# Patient Record
Sex: MALE | Race: BLACK OR AFRICAN AMERICAN | Employment: OTHER | URBAN - METROPOLITAN AREA
[De-identification: names, ages, dates, MRNs, and addresses within clinical notes are randomized per-mention and may not be internally consistent; named-entity substitution may affect disease eponyms.]

---

## 2017-08-14 ENCOUNTER — HOSPITAL ENCOUNTER (OUTPATIENT)
Dept: PHYSICAL THERAPY | Age: 67
Discharge: HOME | End: 2017-08-14
Payer: MEDICARE

## 2017-08-15 ENCOUNTER — HOSPITAL ENCOUNTER (OUTPATIENT)
Dept: PHYSICAL THERAPY | Age: 67
Discharge: HOME | End: 2017-08-15
Payer: MEDICARE

## 2017-08-15 PROCEDURE — 97161 PT EVAL LOW COMPLEX 20 MIN: CPT

## 2017-08-15 PROCEDURE — G8978 MOBILITY CURRENT STATUS: HCPCS

## 2017-08-15 PROCEDURE — G8979 MOBILITY GOAL STATUS: HCPCS

## 2017-08-15 NOTE — PROGRESS NOTES
Tucker Vizcarra  : 1950 [facility]  [address], [address], [place]  [phone]   [phone]          OUTPATIENT PHYSICAL THERAPY:Initial Assessment 8/15/2017    ICD-10: Treatment Diagnosis: Pain in left knee (M25.562); Low back pain (M54.5); Precautions/Allergies:   Review of patient's allergies indicates not on file. Fall Risk Score: 1 (? 5 = High Risk)  MD Orders: Evaluate and Treat MEDICAL/REFERRING DIAGNOSIS:  Unilateral primary osteoarthritis, left knee [M17.12]   DATE OF ONSET: Chronic   REFERRING PHYSICIAN: Cuca Wolfe MD  RETURN PHYSICIAN APPOINTMENT: 17     INITIAL ASSESSMENT:  Mr. Dieudonne Horowitz presents in therapy with reports of chronic L knee and lower back pain. He reports his low back symptoms are worse than L knee pain. He reports a history of lower lumbar herniated disc. He will benefit from skilled PT in order to reduce symptoms for returning to his very active lifestyle and increase safety with functional mobility. PROBLEM LIST (Impacting functional limitations):  1. Decreased Strength  2. Decreased ADL/Functional Activities  3. Increased Pain  4. Decreased Activity Tolerance  5. Decreased Flexibility/Joint Mobility  6. Decreased Knowledge of Precautions  7. Decreased [place] with Home Exercise Program INTERVENTIONS PLANNED:  1. Cold  2. Heat  3. Home Exercise Program (HEP)  4. Manual Therapy  5. Neuromuscular Re-education/Strengthening  6. Range of Motion (ROM)  7. Therapeutic Activites  8. Therapeutic Exercise/Strengthening   TREATMENT PLAN:  Effective Dates: 8/15/17 TO 17. Frequency/Duration: 2 times a week for 6 weeks  GOALS: (Goals have been discussed and agreed upon with patient.)     SHORT-TERM FUNCTIONAL GOALS: Time Frame: 3 weeks  1. Patient will be compliant with HEP focused on lumbar stabilization and strength/ROM.    2.  Patient will rate low back pain no greater than 5/10 at worst for improved tolerance to daily and work activities. DISCHARGE GOALS: Time Frame: 6 weeks  1. Patient will be independent with comprehensive HEP focused on lumbar stabilization and core strengthening. 2.  Patient will rate low back pain no greater than 2/10 and which does not significantly interfere with daily or work duties. 3.  Patient will demonstrate lumbar AROM to be [facility] for improved safety with functional mobility. Rehabilitation Potential For Stated Goals: Good  Regarding Chris Freitas's therapy, I certify that the treatment plan above will be carried out by a therapist or under their direction. Thank you for this referral,  Finesse Torres, PT, DPT     Referring Physician Signature: Abigail Leonard MD              Date                    The information in this section was collected on 8/15/17 (except where otherwise noted). HISTORY:   History of Present Injury/Illness (Reason for Referral):  Patient reports chronic history of L knee and low back pain. He states he may be undergoing L knee replacement next Spring. He reports his lower back pain is worse than the L knee, and has prevented him from returning to cycling and walking for fitness. He reports imaging demonstrated a lower lumbar herniated disc. Past Medical History/Comorbidities:   Mr. Brenda Peter  has no past medical history on file. Mr. Brenda Peter  has no past surgical history on file. Social History/Living Environment:     Independent   Prior Level of Function/Work/Activity:  Very active before pain - cycling and walkign  Personal Factors:          Sex:  male        Age:  74 y.o. Overall Behavior:  Very active and wishes to return     Current Medications:     No current outpatient prescriptions on file.    Date Last Reviewed:  8/16/2017     Number of Personal Factors/Comorbidities that affect the Plan of Care: 0: LOW COMPLEXITY   EXAMINATION:   Observation/Orthostatic Postural Assessment:          Patient appears in very good shape, slightly cautious when rising from a seated position   Palpation:          No tenderness at L knee or with central PA mobilizations throughout lumbosacral spinal segments   ROM:          L knee is 2-110; Lumbar motion is all WNL except standing extension which is 75%  Strength:          WNL throughout B/L LEs       Body Structures Involved:  1. Nerves  2. Bones  3. Joints  4. Muscles  5. Ligaments Body Functions Affected:  1. Sensory/Pain  2. Movement Related Activities and Participation Affected:  1. General Tasks and Demands  2. Mobility  3. Domestic Life  4. Interpersonal Interactions and Relationships  5. Community, Social and [place]   Number of elements (examined above) that affect the Plan of Care: 1-2: LOW COMPLEXITY   CLINICAL PRESENTATION:   Presentation: Stable and uncomplicated: LOW COMPLEXITY   CLINICAL DECISION MAKING:   Outcome Measure: Tool Used: Lower Extremity Functional Scale (LEFS)  Score:  Initial: 31/80 Most Recent: X/80 (Date: -- )   Interpretation of Score: 20 questions each scored on a 5 point scale with 0 representing \"extreme difficulty or unable to perform\" and 4 representing \"no difficulty\". The lower the score, the greater the functional disability. 80/80 represents no disability. Minimal detectable change is 9 points. Score 80 79-63 62-48 47-32 31-16 15-1 0   Modifier CH CI CJ CK CL CM CN     ? Mobility - Walking and Moving Around:     - CURRENT STATUS: CL - 60%-79% impaired, limited or restricted    - GOAL STATUS: CJ - 20%-39% impaired, limited or restricted    - D/C STATUS:  ---------------To be determined---------------      Medical Necessity:   · Patient is expected to demonstrate progress in strength, range of motion and functional technique to improve safety during functional mobility. Reason for Services/Other Comments:  · Patient continues to require skilled intervention due to not reaching long term goals.    Use of outcome tool(s) and clinical judgement create a POC that gives a: Clear prediction of patient's progress: LOW COMPLEXITY            TREATMENT:   (In addition to Assessment/Re-Assessment sessions the following treatments were rendered)  Pre-treatment Symptoms/Complaints:  Patient reports L knee and lower back pain, rated as 10/10 at worst. He reports his back pain is the main issue preventing him from his regular exercise activities such as cycling and walking. Pain: Initial:   4/10 Post Session:  4/10     THERAPEUTIC EXERCISE: (0 minutes):  Exercises per grid below to improve mobility and strength. Required minimal verbal cues to promote proper body alignment, promote proper body posture and promote proper body mechanics. Progressed complexity of movement as indicated. [employer] [facility]  Treatment/Session Assessment:    · Response to Treatment:  Patient verbalized understanding of plan of care, and wishes to focus on lower back at this time. · Compliance with Program/Exercises: Will assess as treatment progresses. · Recommendations/Intent for next treatment session: \"Next visit will focus on advancements to more challenging activities\".   Total Treatment Duration:       Harjeet Welsh, PT, DPT

## 2017-08-16 NOTE — PROGRESS NOTES
Ambulatory/Rehab Services H2 Model Falls Risk Assessment    Risk Factor Pts. ·   Confusion/Disorientation/Impulsivity  []    4 ·   Symptomatic Depression  []   2 ·   Altered Elimination  []   1 ·   Dizziness/Vertigo  []   1 ·   Gender (Male)  [x]   1 ·   Any administered antiepileptics (anticonvulsants):  []   2 ·   Any administered benzodiazepines:  []   1 ·   Visual Impairment (specify):  []   1 ·   Portable Oxygen Use  []   1 ·   Orthostatic ? BP  []   1 ·   History of Recent Falls (within 3 mos.)  []   5     Ability to Rise from Chair (choose one) Pts. ·   Ability to rise in a single movement  [x]   0 ·   Pushes up, successful in one attempt  []   1 ·   Multiple attempts, but successful  []   3 ·   Unable to rise without assistance  []   4   Total: (5 or greater = High Risk) 1     Falls Prevention Plan:   []                Physical Limitations to Exercise (specify):   []                Mobility Assistance Device (type):   []                Exercise/Equipment Adaptation (specify):    ©2010 [facility] [address] Patent #[id number].  Federal Law prohibits the replication, distribution or use without written permission from [facility] [employer]

## 2017-08-17 ENCOUNTER — HOSPITAL ENCOUNTER (OUTPATIENT)
Dept: PHYSICAL THERAPY | Age: 67
Discharge: HOME | End: 2017-08-17
Payer: MEDICARE

## 2017-08-17 PROCEDURE — 97110 THERAPEUTIC EXERCISES: CPT

## 2017-08-17 PROCEDURE — 97016 VASOPNEUMATIC DEVICE THERAPY: CPT

## 2017-08-17 NOTE — PROGRESS NOTES
Chelsea Landa  : 1950 [facility]  [address], [address], [place]  [phone]   [phone]          OUTPATIENT PHYSICAL THERAPY:Daily Note 2017    ICD-10: Treatment Diagnosis: Pain in left knee (M25.562); Low back pain (M54.5); Precautions/Allergies:   Review of patient's allergies indicates not on file. Fall Risk Score: 1 (? 5 = High Risk)  MD Orders: Evaluate and Treat MEDICAL/REFERRING DIAGNOSIS:  Unilateral primary osteoarthritis, left knee [M17.12]   DATE OF ONSET: Chronic   REFERRING PHYSICIAN: Milagros Perez MD  RETURN PHYSICIAN APPOINTMENT: 17     INITIAL ASSESSMENT:  Mr. Stef Perez presents in therapy with reports of chronic L knee and lower back pain. He reports his low back symptoms are worse than L knee pain. He reports a history of lower lumbar herniated disc. He will benefit from skilled PT in order to reduce symptoms for returning to his very active lifestyle and increase safety with functional mobility. PROBLEM LIST (Impacting functional limitations):  1. Decreased Strength  2. Decreased ADL/Functional Activities  3. Increased Pain  4. Decreased Activity Tolerance  5. Decreased Flexibility/Joint Mobility  6. Edema/Girth  7. Decreased Knowledge of Precautions  8. Decreased [place] with Home Exercise Program INTERVENTIONS PLANNED:  1. Cold  2. Heat  3. Home Exercise Program (HEP)  4. Manual Therapy  5. Neuromuscular Re-education/Strengthening  6. Range of Motion (ROM)  7. Therapeutic Activites  8. Therapeutic Exercise/Strengthening   TREATMENT PLAN:  Effective Dates: 8/15/17 TO 17. Frequency/Duration: 2 times a week for 6 weeks  GOALS: (Goals have been discussed and agreed upon with patient.)     SHORT-TERM FUNCTIONAL GOALS: Time Frame: 3 weeks  1. Patient will be compliant with HEP focused on lumbar stabilization and strength/ROM.    2.  Patient will rate low back pain no greater than 5/10 at worst for improved tolerance to daily and work activities. DISCHARGE GOALS: Time Frame: 6 weeks  1. Patient will be independent with comprehensive HEP focused on lumbar stabilization and core strengthening. 2.  Patient will rate low back pain no greater than 2/10 and which does not significantly interfere with daily or work duties. 3.  Patient will demonstrate lumbar AROM to be [facility] for improved safety with functional mobility. Rehabilitation Potential For Stated Goals: Good  Regarding Chris Freitas's therapy, I certify that the treatment plan above will be carried out by a therapist or under their direction. Thank you for this referral,  Cameron Leung PT, DPT     Referring Physician Signature: Albert Simmons MD              Date                    The information in this section was collected on 8/15/17 (except where otherwise noted). HISTORY:   History of Present Injury/Illness (Reason for Referral):  Patient reports chronic history of L knee and low back pain. He states he may be undergoing L knee replacement next Spring. He reports his lower back pain is worse than the L knee, and has prevented him from returning to cycling and walking for fitness. He reports imaging demonstrated a lower lumbar herniated disc. Past Medical History/Comorbidities:   Mr. Allison Avila  has no past medical history on file. Mr. Allison Avila  has no past surgical history on file. Social History/Living Environment:     Independent   Prior Level of Function/Work/Activity:  Very active before pain - cycling and walkign  Personal Factors:          Sex:  male        Age:  74 y.o. Overall Behavior:  Very active and wishes to return     Current Medications:     No current outpatient prescriptions on file.    Date Last Reviewed:  8/17/2017     Number of Personal Factors/Comorbidities that affect the Plan of Care: 0: LOW COMPLEXITY   EXAMINATION:   Observation/Orthostatic Postural Assessment:          Patient appears in very good shape, slightly cautious when rising from a seated position; minimal to moderate edema at L knee  Palpation:          No tenderness at L knee or with central PA mobilizations throughout lumbosacral spinal segments   ROM:          L knee is 2-110; Lumbar motion is all WNL except standing extension which is 75%  Strength:          WNL throughout B/L LEs       Body Structures Involved:  1. Nerves  2. Bones  3. Joints  4. Muscles  5. Ligaments Body Functions Affected:  1. Sensory/Pain  2. Movement Related Activities and Participation Affected:  1. General Tasks and Demands  2. Mobility  3. Domestic Life  4. Interpersonal Interactions and Relationships  5. Community, Social and [place]   Number of elements (examined above) that affect the Plan of Care: 1-2: LOW COMPLEXITY   CLINICAL PRESENTATION:   Presentation: Stable and uncomplicated: LOW COMPLEXITY   CLINICAL DECISION MAKING:   Outcome Measure: Tool Used: Lower Extremity Functional Scale (LEFS)  Score:  Initial: 31/80 Most Recent: X/80 (Date: -- )   Interpretation of Score: 20 questions each scored on a 5 point scale with 0 representing \"extreme difficulty or unable to perform\" and 4 representing \"no difficulty\". The lower the score, the greater the functional disability. 80/80 represents no disability. Minimal detectable change is 9 points. Score 80 79-63 62-48 47-32 31-16 15-1 0   Modifier CH CI CJ CK CL CM CN     ? Mobility - Walking and Moving Around:     - CURRENT STATUS: CL - 60%-79% impaired, limited or restricted    - GOAL STATUS: CJ - 20%-39% impaired, limited or restricted    - D/C STATUS:  ---------------To be determined---------------      Medical Necessity:   · Patient is expected to demonstrate progress in strength, range of motion and functional technique to improve safety during functional mobility.   Reason for Services/Other Comments:  · Patient continues to require skilled intervention due to not reaching long term goals.   Use of outcome tool(s) and clinical judgement create a POC that gives a: Clear prediction of patient's progress: LOW COMPLEXITY            TREATMENT:   (In addition to Assessment/Re-Assessment sessions the following treatments were rendered)  Pre-treatment Symptoms/Complaints:  Patient reports his back pain has improved a lot with his standing lumbar extensions. Pain: Initial:   4/10 Post Session:  2/10     THERAPEUTIC EXERCISE: (30 minutes):  Exercises per grid below to improve mobility and strength. Required minimal verbal cues to promote proper body alignment, promote proper body posture and promote proper body mechanics. Progressed complexity of movement as indicated. Date:  8-17-17 Date:   Date:     Activity/Exercise Parameters Parameters Parameters   Standing lumbar extension 3x10  Over // bar as fulcrum      Prone press ups  3x10                                        Patient received vasopneumatic compression to L knee via GameReady, patient supine with LEs supported under wedge, medium compression, 40 degrees, performed to reduce pain and inflammation at the L knee, and to reduce edema, 15 minutes; Patient responded very well to treatment, noting decreased pain. Treatment/Session Assessment:    · Response to Treatment:  Patient is responding very well to current treatment program.  · Compliance with Program/Exercises: Will assess as treatment progresses. · Recommendations/Intent for next treatment session: \"Next visit will focus on advancements to more challenging activities\".   Total Treatment Duration:  PT Patient Time In/Time Out  Time In: 1100  Time Out: ABRAHAM Gibbons, DPT

## 2017-08-21 ENCOUNTER — APPOINTMENT (OUTPATIENT)
Dept: PHYSICAL THERAPY | Age: 67
End: 2017-08-21
Payer: MEDICARE

## 2017-08-22 ENCOUNTER — HOSPITAL ENCOUNTER (OUTPATIENT)
Dept: PHYSICAL THERAPY | Age: 67
Discharge: HOME | End: 2017-08-22
Payer: MEDICARE

## 2017-08-22 PROCEDURE — 97140 MANUAL THERAPY 1/> REGIONS: CPT

## 2017-08-22 PROCEDURE — 97110 THERAPEUTIC EXERCISES: CPT

## 2017-08-22 NOTE — PROGRESS NOTES
Arnold Agarwal  : 1950 [facility] at [facility]  [address], [address], [place] [address]  [phone]   [phone]          OUTPATIENT PHYSICAL THERAPY:Daily Note 2017    ICD-10: Treatment Diagnosis: Pain in left knee (M25.562); Low back pain (M54.5); Precautions/Allergies:   Review of patient's allergies indicates not on file. Fall Risk Score: 1 (? 5 = High Risk)  MD Orders: Evaluate and Treat MEDICAL/REFERRING DIAGNOSIS:  Unilateral primary osteoarthritis, left knee [M17.12]   DATE OF ONSET: Chronic   REFERRING PHYSICIAN: Leonel Ormond, MD  RETURN PHYSICIAN APPOINTMENT: 17     INITIAL ASSESSMENT:  Mr. Marcella Mondragon presents in therapy with reports of chronic L knee and lower back pain. He reports his low back symptoms are worse than L knee pain. He reports a history of lower lumbar herniated disc. He will benefit from skilled PT in order to reduce symptoms for returning to his very active lifestyle and increase safety with functional mobility. PROBLEM LIST (Impacting functional limitations):  1. Decreased Strength  2. Decreased ADL/Functional Activities  3. Increased Pain  4. Decreased Activity Tolerance  5. Decreased Flexibility/Joint Mobility  6. Edema/Girth  7. Decreased Knowledge of Precautions  8. Decreased [place] with Home Exercise Program INTERVENTIONS PLANNED:  1. Cold  2. Heat  3. Home Exercise Program (HEP)  4. Manual Therapy  5. Neuromuscular Re-education/Strengthening  6. Range of Motion (ROM)  7. Therapeutic Activites  8. Therapeutic Exercise/Strengthening   TREATMENT PLAN:  Effective Dates: 8/15/17 TO 17. Frequency/Duration: 2 times a week for 6 weeks  GOALS: (Goals have been discussed and agreed upon with patient.)     SHORT-TERM FUNCTIONAL GOALS: Time Frame: 3 weeks  1. Patient will be compliant with HEP focused on lumbar stabilization and strength/ROM.    2.  Patient will rate low back pain no greater than 5/10 at worst for improved tolerance to daily and work activities. DISCHARGE GOALS: Time Frame: 6 weeks  1. Patient will be independent with comprehensive HEP focused on lumbar stabilization and core strengthening. 2.  Patient will rate low back pain no greater than 2/10 and which does not significantly interfere with daily or work duties. 3.  Patient will demonstrate lumbar AROM to be [facility] for improved safety with functional mobility. Rehabilitation Potential For Stated Goals: Good  Regarding Chris Freitas's therapy, I certify that the treatment plan above will be carried out by a therapist or under their direction. Thank you for this referral,  Jonathan Babcock, PT, DPT     Referring Physician Signature: Murphy Ashely MD              Date                    The information in this section was collected on 8/15/17 (except where otherwise noted). HISTORY:   History of Present Injury/Illness (Reason for Referral):  Patient reports chronic history of L knee and low back pain. He states he may be undergoing L knee replacement next Spring. He reports his lower back pain is worse than the L knee, and has prevented him from returning to cycling and walking for fitness. He reports imaging demonstrated a lower lumbar herniated disc. Past Medical History/Comorbidities:   Mr. Mimi Mir  has no past medical history on file. Mr. Mimi Mir  has no past surgical history on file. Social History/Living Environment:     Independent   Prior Level of Function/Work/Activity:  Very active before pain - cycling and walkign  Personal Factors:          Sex:  male        Age:  74 y.o. Overall Behavior:  Very active and wishes to return     Current Medications:     No current outpatient prescriptions on file.    Date Last Reviewed:  8/22/2017     Number of Personal Factors/Comorbidities that affect the Plan of Care: 0: LOW COMPLEXITY   EXAMINATION:   Observation/Orthostatic Postural Assessment:          Patient appears in very good shape, slightly cautious when rising from a seated position; minimal to moderate edema at L knee  Palpation:          No tenderness at L knee or with central PA mobilizations throughout lumbosacral spinal segments   ROM:          L knee is 2-110; Lumbar motion is all WNL except standing extension which is 75%  Strength:          WNL throughout B/L LEs       Body Structures Involved:  1. Nerves  2. Bones  3. Joints  4. Muscles  5. Ligaments Body Functions Affected:  1. Sensory/Pain  2. Movement Related Activities and Participation Affected:  1. General Tasks and Demands  2. Mobility  3. Domestic Life  4. Interpersonal Interactions and Relationships  5. Community, Social and [place]   Number of elements (examined above) that affect the Plan of Care: 1-2: LOW COMPLEXITY   CLINICAL PRESENTATION:   Presentation: Stable and uncomplicated: LOW COMPLEXITY   CLINICAL DECISION MAKING:   Outcome Measure: Tool Used: Lower Extremity Functional Scale (LEFS)  Score:  Initial: 31/80 Most Recent: X/80 (Date: -- )   Interpretation of Score: 20 questions each scored on a 5 point scale with 0 representing \"extreme difficulty or unable to perform\" and 4 representing \"no difficulty\". The lower the score, the greater the functional disability. 80/80 represents no disability. Minimal detectable change is 9 points. Score 80 79-63 62-48 47-32 31-16 15-1 0   Modifier CH CI CJ CK CL CM CN     ? Mobility - Walking and Moving Around:     - CURRENT STATUS: CL - 60%-79% impaired, limited or restricted    - GOAL STATUS: CJ - 20%-39% impaired, limited or restricted    - D/C STATUS:  ---------------To be determined---------------      Medical Necessity:   · Patient is expected to demonstrate progress in strength, range of motion and functional technique to improve safety during functional mobility.   Reason for Services/Other Comments:  · Patient continues to require skilled intervention due to not reaching long term goals.   Use of outcome tool(s) and clinical judgement create a POC that gives a: Clear prediction of patient's progress: LOW COMPLEXITY            TREATMENT:   (In addition to Assessment/Re-Assessment sessions the following treatments were rendered)  Pre-treatment Symptoms/Complaints:  Patient reports his low back is doing really well, but his L knee has been the main issue. Pain: Initial:   4/10 Post Session:  2/10     THERAPEUTIC EXERCISE: (30 minutes):  Exercises per grid below to improve mobility and strength. Required minimal verbal cues to promote proper body alignment, promote proper body posture and promote proper body mechanics. Progressed complexity of movement as indicated. Date:  8-17-17 Date:  8-22-17 Date:     Activity/Exercise Parameters Parameters Parameters   Standing lumbar extension 3x10  Over // bar as fulcrum  3x10  Over // bar as fulcrum     Prone press ups  3x10    3x10                                       Manual therapy: (15 minutes): Patient received patellar mobilizations in all directions, 10 second oscillations at end range x 10 each plane, knee in extension with towel roll under knee, patient supine, to improve flexibility at the PFJ and decrease pain; patient also received passive knee extension over-pressure to end range, 20 second oscillations in grade II-III, x 10 each, patient supine, to improve flexibility for better functional mobility       Patient received vasopneumatic compression to L knee via GameReady, patient supine with LEs supported under wedge, medium compression, 40 degrees, performed to reduce pain and inflammation at the L knee, and to reduce edema, 15 minutes; Patient responded very well to treatment, noting decreased pain. (NOT TODAY)    Treatment/Session Assessment:    · Response to Treatment:  Patient is responding very well to current treatment program.  · Compliance with Program/Exercises: Will assess as treatment progresses.   · Recommendations/Intent for next treatment session: \"Next visit will focus on advancements to more challenging activities\".   Total Treatment Duration:       Bard Francisco, PT, DPT

## 2017-08-29 ENCOUNTER — HOSPITAL ENCOUNTER (OUTPATIENT)
Dept: PHYSICAL THERAPY | Age: 67
Discharge: HOME | End: 2017-08-29
Payer: MEDICARE

## 2017-08-29 PROCEDURE — 97016 VASOPNEUMATIC DEVICE THERAPY: CPT

## 2017-08-29 PROCEDURE — 97140 MANUAL THERAPY 1/> REGIONS: CPT

## 2017-08-29 NOTE — PROGRESS NOTES
Lauren Lancaster  : 1950 [facility]  [address], [address], [place]  [phone]   [phone]          OUTPATIENT PHYSICAL THERAPY:Daily Note 2017    ICD-10: Treatment Diagnosis: Pain in left knee (M25.562); Low back pain (M54.5); Precautions/Allergies:   Review of patient's allergies indicates not on file. Fall Risk Score: 1 (? 5 = High Risk)  MD Orders: Evaluate and Treat MEDICAL/REFERRING DIAGNOSIS:  Unilateral primary osteoarthritis, left knee [M17.12]   DATE OF ONSET: Chronic   REFERRING PHYSICIAN: Benji Silva MD  RETURN PHYSICIAN APPOINTMENT: 17     INITIAL ASSESSMENT:  Mr. Albert Ulrich presents in therapy with reports of chronic L knee and lower back pain. He reports his low back symptoms are worse than L knee pain. He reports a history of lower lumbar herniated disc. He will benefit from skilled PT in order to reduce symptoms for returning to his very active lifestyle and increase safety with functional mobility. PROBLEM LIST (Impacting functional limitations):  1. Decreased Strength  2. Decreased ADL/Functional Activities  3. Increased Pain  4. Decreased Activity Tolerance  5. Decreased Flexibility/Joint Mobility  6. Edema/Girth  7. Decreased Knowledge of Precautions  8. Decreased [place] with Home Exercise Program INTERVENTIONS PLANNED:  1. Cold  2. Heat  3. Home Exercise Program (HEP)  4. Manual Therapy  5. Neuromuscular Re-education/Strengthening  6. Range of Motion (ROM)  7. Therapeutic Activites  8. Therapeutic Exercise/Strengthening   TREATMENT PLAN:  Effective Dates: 8/15/17 TO 17. Frequency/Duration: 2 times a week for 6 weeks  GOALS: (Goals have been discussed and agreed upon with patient.)     SHORT-TERM FUNCTIONAL GOALS: Time Frame: 3 weeks  1. Patient will be compliant with HEP focused on lumbar stabilization and strength/ROM.    2.  Patient will rate low back pain no greater than 5/10 at worst for improved tolerance to daily and work activities. DISCHARGE GOALS: Time Frame: 6 weeks  1. Patient will be independent with comprehensive HEP focused on lumbar stabilization and core strengthening. 2.  Patient will rate low back pain no greater than 2/10 and which does not significantly interfere with daily or work duties. 3.  Patient will demonstrate lumbar AROM to be [facility] for improved safety with functional mobility. Rehabilitation Potential For Stated Goals: Good  Regarding Chris Freitas's therapy, I certify that the treatment plan above will be carried out by a therapist or under their direction. Thank you for this referral,  Cameron Leung PT, DPT     Referring Physician Signature: Albert Simmons MD              Date                    The information in this section was collected on 8/15/17 (except where otherwise noted). HISTORY:   History of Present Injury/Illness (Reason for Referral):  Patient reports chronic history of L knee and low back pain. He states he may be undergoing L knee replacement next Spring. He reports his lower back pain is worse than the L knee, and has prevented him from returning to cycling and walking for fitness. He reports imaging demonstrated a lower lumbar herniated disc. Past Medical History/Comorbidities:   Mr. Allison Avila  has no past medical history on file. Mr. Allison Avila  has no past surgical history on file. Social History/Living Environment:     Independent   Prior Level of Function/Work/Activity:  Very active before pain - cycling and walkign  Personal Factors:          Sex:  male        Age:  74 y.o. Overall Behavior:  Very active and wishes to return     Current Medications:     No current outpatient prescriptions on file.    Date Last Reviewed:  8/29/2017     Number of Personal Factors/Comorbidities that affect the Plan of Care: 0: LOW COMPLEXITY   EXAMINATION:   Observation/Orthostatic Postural Assessment:          Patient appears in very good shape, slightly cautious when rising from a seated position; minimal to moderate edema at L knee  Palpation:          No tenderness at L knee or with central PA mobilizations throughout lumbosacral spinal segments   ROM:          L knee is 2-110; Lumbar motion is all WNL except standing extension which is 75%  Strength:          WNL throughout B/L LEs       Body Structures Involved:  1. Nerves  2. Bones  3. Joints  4. Muscles  5. Ligaments Body Functions Affected:  1. Sensory/Pain  2. Movement Related Activities and Participation Affected:  1. General Tasks and Demands  2. Mobility  3. Domestic Life  4. Interpersonal Interactions and Relationships  5. Community, Social and [place]   Number of elements (examined above) that affect the Plan of Care: 1-2: LOW COMPLEXITY   CLINICAL PRESENTATION:   Presentation: Stable and uncomplicated: LOW COMPLEXITY   CLINICAL DECISION MAKING:   Outcome Measure: Tool Used: Lower Extremity Functional Scale (LEFS)  Score:  Initial: 31/80 Most Recent: X/80 (Date: -- )   Interpretation of Score: 20 questions each scored on a 5 point scale with 0 representing \"extreme difficulty or unable to perform\" and 4 representing \"no difficulty\". The lower the score, the greater the functional disability. 80/80 represents no disability. Minimal detectable change is 9 points. Score 80 79-63 62-48 47-32 31-16 15-1 0   Modifier CH CI CJ CK CL CM CN     ? Mobility - Walking and Moving Around:     - CURRENT STATUS: CL - 60%-79% impaired, limited or restricted    - GOAL STATUS: CJ - 20%-39% impaired, limited or restricted    - D/C STATUS:  ---------------To be determined---------------      Medical Necessity:   · Patient is expected to demonstrate progress in strength, range of motion and functional technique to improve safety during functional mobility.   Reason for Services/Other Comments:  · Patient continues to require skilled intervention due to not reaching long term goals.   Use of outcome tool(s) and clinical judgement create a POC that gives a: Clear prediction of patient's progress: LOW COMPLEXITY            TREATMENT:   (In addition to Assessment/Re-Assessment sessions the following treatments were rendered)  Pre-treatment Symptoms/Complaints:  Patient reports he is doing well, but received a gel injection yesterday and was told to take PT very easy today. Pain: Initial:   4/10 Post Session:  2/10     THERAPEUTIC EXERCISE: (0 minutes):  Exercises per grid below to improve mobility and strength. Required minimal verbal cues to promote proper body alignment, promote proper body posture and promote proper body mechanics. Progressed complexity of movement as indicated. Date:  8-17-17 Date:  8-22-17 Date:     Activity/Exercise Parameters Parameters Parameters   Standing lumbar extension 3x10  Over // bar as fulcrum  3x10  Over // bar as fulcrum     Prone press ups  3x10    3x10                                       Manual therapy: (30 minutes): Patient received patellar mobilizations in all directions, 10 second oscillations at end range x 10 each plane, knee in extension with towel roll under knee, patient supine, to improve flexibility at the PFJ and decrease pain; patient also received passive knee extension over-pressure to end range, 20 second oscillations in grade II-III, x 10 each, patient supine, to improve flexibility for better functional mobility   Patient also received manual STM to distal quadriceps and medial knee musculature to improve pain and decrease edema at the affected LE      Patient received vasopneumatic compression to L knee via GameReady, patient supine with LEs supported under wedge, medium compression, 40 degrees, performed to reduce pain and inflammation at the L knee, and to reduce edema, 15 minutes; Patient responded very well to treatment, noting decreased pain.      Treatment/Session Assessment:    · Response to Treatment:  Patient is responding very well to current treatment program.  · Compliance with Program/Exercises: Will assess as treatment progresses. · Recommendations/Intent for next treatment session: \"Next visit will focus on advancements to more challenging activities\".   Total Treatment Duration:  PT Patient Time In/Time Out  Time In: 1300  Time Out: 1345    Vivian Sadler, PT, DPT

## 2017-08-31 ENCOUNTER — HOSPITAL ENCOUNTER (OUTPATIENT)
Dept: PHYSICAL THERAPY | Age: 67
Discharge: HOME | End: 2017-08-31
Payer: MEDICARE

## 2017-08-31 PROCEDURE — 97016 VASOPNEUMATIC DEVICE THERAPY: CPT

## 2017-08-31 PROCEDURE — 97140 MANUAL THERAPY 1/> REGIONS: CPT

## 2017-08-31 NOTE — PROGRESS NOTES
Yazan Mccoy  : 1950 [facility] at [facility]  [address], [address], [place]  [phone]   [phone]          OUTPATIENT PHYSICAL THERAPY:Daily Note 2017    ICD-10: Treatment Diagnosis: Pain in left knee (M25.562); Low back pain (M54.5); Precautions/Allergies:   Review of patient's allergies indicates not on file. Fall Risk Score: 1 (? 5 = High Risk)  MD Orders: Evaluate and Treat MEDICAL/REFERRING DIAGNOSIS:  Unilateral primary osteoarthritis, left knee [M17.12]   DATE OF ONSET: Chronic   REFERRING PHYSICIAN: Ely Schwarz MD  RETURN PHYSICIAN APPOINTMENT: 17     INITIAL ASSESSMENT:  Mr. Nawaf Ulrich presents in therapy with reports of chronic L knee and lower back pain. He reports his low back symptoms are worse than L knee pain. He reports a history of lower lumbar herniated disc. He will benefit from skilled PT in order to reduce symptoms for returning to his very active lifestyle and increase safety with functional mobility. PROBLEM LIST (Impacting functional limitations):  1. Decreased Strength  2. Decreased ADL/Functional Activities  3. Increased Pain  4. Decreased Activity Tolerance  5. Decreased Flexibility/Joint Mobility  6. Edema/Girth  7. Decreased Knowledge of Precautions  8. Decreased [place] with Home Exercise Program INTERVENTIONS PLANNED:  1. Cold  2. Heat  3. Home Exercise Program (HEP)  4. Manual Therapy  5. Neuromuscular Re-education/Strengthening  6. Range of Motion (ROM)  7. Therapeutic Activites  8. Therapeutic Exercise/Strengthening   TREATMENT PLAN:  Effective Dates: 8/15/17 TO 17. Frequency/Duration: 2 times a week for 6 weeks  GOALS: (Goals have been discussed and agreed upon with patient.)     SHORT-TERM FUNCTIONAL GOALS: Time Frame: 3 weeks  1. Patient will be compliant with HEP focused on lumbar stabilization and strength/ROM.    2.  Patient will rate low back pain no greater than 5/10 at worst for improved tolerance to daily and work activities. DISCHARGE GOALS: Time Frame: 6 weeks  1. Patient will be independent with comprehensive HEP focused on lumbar stabilization and core strengthening. 2.  Patient will rate low back pain no greater than 2/10 and which does not significantly interfere with daily or work duties. 3.  Patient will demonstrate lumbar AROM to be [facility] for improved safety with functional mobility. Rehabilitation Potential For Stated Goals: Good  Regarding Chris Freitas's therapy, I certify that the treatment plan above will be carried out by a therapist or under their direction. Thank you for this referral,  Brisa Patel, PT, DPT     Referring Physician Signature: Yannick Rossi MD              Date                    The information in this section was collected on 8/15/17 (except where otherwise noted). HISTORY:   History of Present Injury/Illness (Reason for Referral):  Patient reports chronic history of L knee and low back pain. He states he may be undergoing L knee replacement next Spring. He reports his lower back pain is worse than the L knee, and has prevented him from returning to cycling and walking for fitness. He reports imaging demonstrated a lower lumbar herniated disc. Past Medical History/Comorbidities:   Mr. Laura Montiel  has no past medical history on file. Mr. Laura Montiel  has no past surgical history on file. Social History/Living Environment:     Independent   Prior Level of Function/Work/Activity:  Very active before pain - cycling and walkign  Personal Factors:          Sex:  male        Age:  74 y.o. Overall Behavior:  Very active and wishes to return     Current Medications:     No current outpatient prescriptions on file.    Date Last Reviewed:  8/31/2017     Number of Personal Factors/Comorbidities that affect the Plan of Care: 0: LOW COMPLEXITY   EXAMINATION:   Observation/Orthostatic Postural Assessment:          Patient appears in very good shape, slightly cautious when rising from a seated position; minimal to moderate edema at L knee  Palpation:          No tenderness at L knee or with central PA mobilizations throughout lumbosacral spinal segments   ROM:          L knee is 2-110; Lumbar motion is all WNL except standing extension which is 75%  Strength:          WNL throughout B/L LEs       Body Structures Involved:  1. Nerves  2. Bones  3. Joints  4. Muscles  5. Ligaments Body Functions Affected:  1. Sensory/Pain  2. Movement Related Activities and Participation Affected:  1. General Tasks and Demands  2. Mobility  3. Domestic Life  4. Interpersonal Interactions and Relationships  5. Community, Social and [place]   Number of elements (examined above) that affect the Plan of Care: 1-2: LOW COMPLEXITY   CLINICAL PRESENTATION:   Presentation: Stable and uncomplicated: LOW COMPLEXITY   CLINICAL DECISION MAKING:   Outcome Measure: Tool Used: Lower Extremity Functional Scale (LEFS)  Score:  Initial: 31/80 Most Recent: X/80 (Date: -- )   Interpretation of Score: 20 questions each scored on a 5 point scale with 0 representing \"extreme difficulty or unable to perform\" and 4 representing \"no difficulty\". The lower the score, the greater the functional disability. 80/80 represents no disability. Minimal detectable change is 9 points. Score 80 79-63 62-48 47-32 31-16 15-1 0   Modifier CH CI CJ CK CL CM CN     ? Mobility - Walking and Moving Around:     - CURRENT STATUS: CL - 60%-79% impaired, limited or restricted    - GOAL STATUS: CJ - 20%-39% impaired, limited or restricted    - D/C STATUS:  ---------------To be determined---------------      Medical Necessity:   · Patient is expected to demonstrate progress in strength, range of motion and functional technique to improve safety during functional mobility.   Reason for Services/Other Comments:  · Patient continues to require skilled intervention due to not reaching long term goals.   Use of outcome tool(s) and clinical judgement create a POC that gives a: Clear prediction of patient's progress: LOW COMPLEXITY            TREATMENT:   (In addition to Assessment/Re-Assessment sessions the following treatments were rendered)  Pre-treatment Symptoms/Complaints:  Patient reports his L knee has still been giving him pain, but his low back is doing well. Pain: Initial:   4/10 Post Session:  2/10     THERAPEUTIC EXERCISE: (0 minutes):  Exercises per grid below to improve mobility and strength. Required minimal verbal cues to promote proper body alignment, promote proper body posture and promote proper body mechanics. Progressed complexity of movement as indicated. Date:  8-17-17 Date:  8-22-17 Date:     Activity/Exercise Parameters Parameters Parameters   Standing lumbar extension 3x10  Over // bar as fulcrum  3x10  Over // bar as fulcrum     Prone press ups  3x10    3x10                                       Manual therapy: (30 minutes): Patient received patellar mobilizations in all directions, 10 second oscillations at end range x 10 each plane, knee in extension with towel roll under knee, patient supine, to improve flexibility at the PFJ and decrease pain; patient also received passive knee extension over-pressure to end range, 20 second oscillations in grade II-III, x 10 each, patient supine, to improve flexibility for better functional mobility   Patient also received manual STM to distal quadriceps and medial knee musculature to improve pain and decrease edema at the affected LE      Patient received vasopneumatic compression to L knee via GameReady, patient supine with LEs supported under wedge, medium compression, 40 degrees, performed to reduce pain and inflammation at the L knee, and to reduce edema, 15 minutes; Patient responded very well to treatment, noting decreased pain.      Treatment/Session Assessment:    · Response to Treatment:  Patient is responding very well to current treatment program. Stated his knee felt \"much better\" after STM today. · Compliance with Program/Exercises: Will assess as treatment progresses. · Recommendations/Intent for next treatment session: \"Next visit will focus on advancements to more challenging activities\".   Total Treatment Duration:  PT Patient Time In/Time Out  Time In: 1100  Time Out: ABRAHAM Gibbons, DPT

## 2017-09-05 ENCOUNTER — APPOINTMENT (OUTPATIENT)
Dept: PHYSICAL THERAPY | Age: 67
End: 2017-09-05
Payer: MEDICARE

## 2017-09-07 ENCOUNTER — APPOINTMENT (OUTPATIENT)
Dept: PHYSICAL THERAPY | Age: 67
End: 2017-09-07
Payer: MEDICARE

## 2017-09-07 ENCOUNTER — HOSPITAL ENCOUNTER (OUTPATIENT)
Dept: PHYSICAL THERAPY | Age: 67
Discharge: HOME | End: 2017-09-07
Payer: MEDICARE

## 2017-09-07 NOTE — PROGRESS NOTES
Mj Sung  : 1950 [facility]  [address], [address], [place] [address]  [phone]   [phone]        OUTPATIENT DAILY NOTE    NAME/AGE/GENDER: Mj Sung is a 74 y.o. male. DATE: 2017    Patient canceled for appointment today due to family emergency. Will plan to follow up on next scheduled visit.     Aleksandar Augustin, PT

## 2017-09-11 ENCOUNTER — HOSPITAL ENCOUNTER (OUTPATIENT)
Dept: PHYSICAL THERAPY | Age: 67
Discharge: HOME | End: 2017-09-11
Payer: MEDICARE

## 2017-09-11 PROCEDURE — 97140 MANUAL THERAPY 1/> REGIONS: CPT

## 2017-09-11 PROCEDURE — 97016 VASOPNEUMATIC DEVICE THERAPY: CPT

## 2017-09-11 NOTE — PROGRESS NOTES
Jose Luis Jay  : 1950 [facility]  [address], [address], [place]  [phone]   [phone]          OUTPATIENT PHYSICAL THERAPY:Daily Note 2017    ICD-10: Treatment Diagnosis: Pain in left knee (M25.562); Low back pain (M54.5); Precautions/Allergies:   Review of patient's allergies indicates not on file. Fall Risk Score: 1 (? 5 = High Risk)  MD Orders: Evaluate and Treat MEDICAL/REFERRING DIAGNOSIS:  Unilateral primary osteoarthritis, left knee [M17.12]   DATE OF ONSET: Chronic   REFERRING PHYSICIAN: Lakesha Ramos MD  RETURN PHYSICIAN APPOINTMENT: 17     INITIAL ASSESSMENT:  Mr. Divya Alberto presents in therapy with reports of chronic L knee and lower back pain. He reports his low back symptoms are worse than L knee pain. He reports a history of lower lumbar herniated disc. He will benefit from skilled PT in order to reduce symptoms for returning to his very active lifestyle and increase safety with functional mobility. PROBLEM LIST (Impacting functional limitations):  1. Decreased Strength  2. Decreased ADL/Functional Activities  3. Increased Pain  4. Decreased Activity Tolerance  5. Decreased Flexibility/Joint Mobility  6. Edema/Girth  7. Decreased Knowledge of Precautions  8. Decreased [place] with Home Exercise Program INTERVENTIONS PLANNED:  1. Cold  2. Heat  3. Home Exercise Program (HEP)  4. Manual Therapy  5. Neuromuscular Re-education/Strengthening  6. Range of Motion (ROM)  7. Therapeutic Activites  8. Therapeutic Exercise/Strengthening   TREATMENT PLAN:  Effective Dates: 8/15/17 TO 17. Frequency/Duration: 2 times a week for 6 weeks  GOALS: (Goals have been discussed and agreed upon with patient.)     SHORT-TERM FUNCTIONAL GOALS: Time Frame: 3 weeks  1. Patient will be compliant with HEP focused on lumbar stabilization and strength/ROM.    2.  Patient will rate low back pain no greater than 5/10 at worst for improved tolerance to daily and work activities. DISCHARGE GOALS: Time Frame: 6 weeks  1. Patient will be independent with comprehensive HEP focused on lumbar stabilization and core strengthening. 2.  Patient will rate low back pain no greater than 2/10 and which does not significantly interfere with daily or work duties. 3.  Patient will demonstrate lumbar AROM to be [facility] for improved safety with functional mobility. Rehabilitation Potential For Stated Goals: Good  Regarding Chris Freitas's therapy, I certify that the treatment plan above will be carried out by a therapist or under their direction. Thank you for this referral,  Farhat Dunn, PT, DPT     Referring Physician Signature: Jak Christensen MD              Date                    The information in this section was collected on 8/15/17 (except where otherwise noted). HISTORY:   History of Present Injury/Illness (Reason for Referral):  Patient reports chronic history of L knee and low back pain. He states he may be undergoing L knee replacement next Spring. He reports his lower back pain is worse than the L knee, and has prevented him from returning to cycling and walking for fitness. He reports imaging demonstrated a lower lumbar herniated disc. Past Medical History/Comorbidities:   Mr. Misty William  has no past medical history on file. Mr. Misty William  has no past surgical history on file. Social History/Living Environment:     Independent   Prior Level of Function/Work/Activity:  Very active before pain - cycling and walkign  Personal Factors:          Sex:  male        Age:  74 y.o. Overall Behavior:  Very active and wishes to return     Current Medications:     No current outpatient prescriptions on file.    Date Last Reviewed:  9/11/2017     Number of Personal Factors/Comorbidities that affect the Plan of Care: 0: LOW COMPLEXITY   EXAMINATION:   Observation/Orthostatic Postural Assessment:          Patient appears in very good shape, slightly cautious when rising from a seated position; minimal to moderate edema at L knee  Palpation:          No tenderness at L knee or with central PA mobilizations throughout lumbosacral spinal segments   ROM:          L knee is 2-110; Lumbar motion is all WNL except standing extension which is 75%  Strength:          WNL throughout B/L LEs       Body Structures Involved:  1. Nerves  2. Bones  3. Joints  4. Muscles  5. Ligaments Body Functions Affected:  1. Sensory/Pain  2. Movement Related Activities and Participation Affected:  1. General Tasks and Demands  2. Mobility  3. Domestic Life  4. Interpersonal Interactions and Relationships  5. Community, Social and [place]   Number of elements (examined above) that affect the Plan of Care: 1-2: LOW COMPLEXITY   CLINICAL PRESENTATION:   Presentation: Stable and uncomplicated: LOW COMPLEXITY   CLINICAL DECISION MAKING:   Outcome Measure: Tool Used: Lower Extremity Functional Scale (LEFS)  Score:  Initial: 31/80 Most Recent: X/80 (Date: -- )   Interpretation of Score: 20 questions each scored on a 5 point scale with 0 representing \"extreme difficulty or unable to perform\" and 4 representing \"no difficulty\". The lower the score, the greater the functional disability. 80/80 represents no disability. Minimal detectable change is 9 points. Score 80 79-63 62-48 47-32 31-16 15-1 0   Modifier CH CI CJ CK CL CM CN     ? Mobility - Walking and Moving Around:     - CURRENT STATUS: CL - 60%-79% impaired, limited or restricted    - GOAL STATUS: CJ - 20%-39% impaired, limited or restricted    - D/C STATUS:  ---------------To be determined---------------      Medical Necessity:   · Patient is expected to demonstrate progress in strength, range of motion and functional technique to improve safety during functional mobility.   Reason for Services/Other Comments:  · Patient continues to require skilled intervention due to not reaching long term goals.   Use of outcome tool(s) and clinical judgement create a POC that gives a: Clear prediction of patient's progress: LOW COMPLEXITY            TREATMENT:   (In addition to Assessment/Re-Assessment sessions the following treatments were rendered)  Pre-treatment Symptoms/Complaints:  Patient reports his L knee has still been giving him pain, but his low back is doing well. Pain: Initial:   4/10 Post Session:  2/10     THERAPEUTIC EXERCISE: (0 minutes):  Exercises per grid below to improve mobility and strength. Required minimal verbal cues to promote proper body alignment, promote proper body posture and promote proper body mechanics. Progressed complexity of movement as indicated. Date:  8-17-17 Date:  8-22-17 Date:     Activity/Exercise Parameters Parameters Parameters   Standing lumbar extension 3x10  Over // bar as fulcrum  3x10  Over // bar as fulcrum     Prone press ups  3x10    3x10                                       Manual therapy: (30 minutes): Patient received patellar mobilizations in all directions, 10 second oscillations at end range x 10 each plane, knee in extension with towel roll under knee, patient supine, to improve flexibility at the PFJ and decrease pain; patient also received passive knee extension over-pressure to end range, 20 second oscillations in grade II-III, x 10 each, patient supine, to improve flexibility for better functional mobility   Patient also received manual STM to distal quadriceps and medial knee musculature to improve pain and decrease edema at the affected LE      Patient received vasopneumatic compression to L knee via GameReady, patient supine with LEs supported under wedge, medium compression, 40 degrees, performed to reduce pain and inflammation at the L knee, and to reduce edema, 15 minutes; Patient responded very well to treatment, noting decreased pain.      Treatment/Session Assessment:    · Response to Treatment:  Patient is responding very well to current treatment program. Stated his knee felt \"much better\" after STM today. · Compliance with Program/Exercises: Will assess as treatment progresses. · Recommendations/Intent for next treatment session: \"Next visit will focus on advancements to more challenging activities\".   Total Treatment Duration:  PT Patient Time In/Time Out  Time In: 1345  Time Out: [address], PT, DPT

## 2017-09-14 ENCOUNTER — HOSPITAL ENCOUNTER (OUTPATIENT)
Dept: PHYSICAL THERAPY | Age: 67
Discharge: HOME | End: 2017-09-14
Payer: MEDICARE

## 2017-09-14 PROCEDURE — 97140 MANUAL THERAPY 1/> REGIONS: CPT

## 2017-09-14 PROCEDURE — 97016 VASOPNEUMATIC DEVICE THERAPY: CPT

## 2017-09-14 NOTE — PROGRESS NOTES
Mindy Christensen  : 1950 [facility]  [address], [address], [place]  [phone]   [phone]          OUTPATIENT PHYSICAL THERAPY:Daily Note 2017    ICD-10: Treatment Diagnosis: Pain in left knee (M25.562); Low back pain (M54.5); Precautions/Allergies:   Review of patient's allergies indicates not on file. Fall Risk Score: 1 (? 5 = High Risk)  MD Orders: Evaluate and Treat MEDICAL/REFERRING DIAGNOSIS:  Unilateral primary osteoarthritis, left knee [M17.12]   DATE OF ONSET: Chronic   REFERRING PHYSICIAN: Jessica Kingston MD  RETURN PHYSICIAN APPOINTMENT: 17     INITIAL ASSESSMENT:  Mr. Jenny Mcginnis presents in therapy with reports of chronic L knee and lower back pain. He reports his low back symptoms are worse than L knee pain. He reports a history of lower lumbar herniated disc. He will benefit from skilled PT in order to reduce symptoms for returning to his very active lifestyle and increase safety with functional mobility. PROBLEM LIST (Impacting functional limitations):  1. Decreased Strength  2. Decreased ADL/Functional Activities  3. Increased Pain  4. Decreased Activity Tolerance  5. Decreased Flexibility/Joint Mobility  6. Edema/Girth  7. Decreased Knowledge of Precautions  8. Decreased [place] with Home Exercise Program INTERVENTIONS PLANNED:  1. Cold  2. Heat  3. Home Exercise Program (HEP)  4. Manual Therapy  5. Neuromuscular Re-education/Strengthening  6. Range of Motion (ROM)  7. Therapeutic Activites  8. Therapeutic Exercise/Strengthening   TREATMENT PLAN:  Effective Dates: 8/15/17 TO 17. Frequency/Duration: 2 times a week for 6 weeks  GOALS: (Goals have been discussed and agreed upon with patient.)     SHORT-TERM FUNCTIONAL GOALS: Time Frame: 3 weeks  1. Patient will be compliant with HEP focused on lumbar stabilization and strength/ROM.    2.  Patient will rate low back pain no greater than 5/10 at worst for improved tolerance to daily and work activities. DISCHARGE GOALS: Time Frame: 6 weeks  1. Patient will be independent with comprehensive HEP focused on lumbar stabilization and core strengthening. 2.  Patient will rate low back pain no greater than 2/10 and which does not significantly interfere with daily or work duties. 3.  Patient will demonstrate lumbar AROM to be [facility] for improved safety with functional mobility. Rehabilitation Potential For Stated Goals: Good  Regarding Chris Freitas's therapy, I certify that the treatment plan above will be carried out by a therapist or under their direction. Thank you for this referral,  Federico Lee, PT, DPT     Referring Physician Signature: Leonel Ormond, MD              Date                    The information in this section was collected on 8/15/17 (except where otherwise noted). HISTORY:   History of Present Injury/Illness (Reason for Referral):  Patient reports chronic history of L knee and low back pain. He states he may be undergoing L knee replacement next Spring. He reports his lower back pain is worse than the L knee, and has prevented him from returning to cycling and walking for fitness. He reports imaging demonstrated a lower lumbar herniated disc. Past Medical History/Comorbidities:   Mr. Marcella Mondragon  has no past medical history on file. Mr. Marcella Mondragon  has no past surgical history on file. Social History/Living Environment:     Independent   Prior Level of Function/Work/Activity:  Very active before pain - cycling and walkign  Personal Factors:          Sex:  male        Age:  74 y.o. Overall Behavior:  Very active and wishes to return     Current Medications:     No current outpatient prescriptions on file.    Date Last Reviewed:  9/14/2017     Number of Personal Factors/Comorbidities that affect the Plan of Care: 0: LOW COMPLEXITY   EXAMINATION:   Observation/Orthostatic Postural Assessment:          Patient appears in very good shape, slightly cautious when rising from a seated position; minimal to moderate edema at L knee  Palpation:          No tenderness at L knee or with central PA mobilizations throughout lumbosacral spinal segments   ROM:          L knee is 2-110; Lumbar motion is all WNL except standing extension which is 75%  Strength:          WNL throughout B/L LEs       Body Structures Involved:  1. Nerves  2. Bones  3. Joints  4. Muscles  5. Ligaments Body Functions Affected:  1. Sensory/Pain  2. Movement Related Activities and Participation Affected:  1. General Tasks and Demands  2. Mobility  3. Domestic Life  4. Interpersonal Interactions and Relationships  5. Community, Social and [place]   Number of elements (examined above) that affect the Plan of Care: 1-2: LOW COMPLEXITY   CLINICAL PRESENTATION:   Presentation: Stable and uncomplicated: LOW COMPLEXITY   CLINICAL DECISION MAKING:   Outcome Measure: Tool Used: Lower Extremity Functional Scale (LEFS)  Score:  Initial: 31/80 Most Recent: X/80 (Date: -- )   Interpretation of Score: 20 questions each scored on a 5 point scale with 0 representing \"extreme difficulty or unable to perform\" and 4 representing \"no difficulty\". The lower the score, the greater the functional disability. 80/80 represents no disability. Minimal detectable change is 9 points. Score 80 79-63 62-48 47-32 31-16 15-1 0   Modifier CH CI CJ CK CL CM CN     ? Mobility - Walking and Moving Around:     - CURRENT STATUS: CL - 60%-79% impaired, limited or restricted    - GOAL STATUS: CJ - 20%-39% impaired, limited or restricted    - D/C STATUS:  ---------------To be determined---------------      Medical Necessity:   · Patient is expected to demonstrate progress in strength, range of motion and functional technique to improve safety during functional mobility.   Reason for Services/Other Comments:  · Patient continues to require skilled intervention due to not reaching long term goals.   Use of outcome tool(s) and clinical judgement create a POC that gives a: Clear prediction of patient's progress: LOW COMPLEXITY            TREATMENT:   (In addition to Assessment/Re-Assessment sessions the following treatments were rendered)  Pre-treatment Symptoms/Complaints:  Patient reports his L knee is still problematic, but the manual therapy seems to be the only thing that helps. Pain: Initial:   4/10 Post Session:  2/10     THERAPEUTIC EXERCISE: (0 minutes):  Exercises per grid below to improve mobility and strength. Required minimal verbal cues to promote proper body alignment, promote proper body posture and promote proper body mechanics. Progressed complexity of movement as indicated. Date:  8-17-17 Date:  8-22-17    Activity/Exercise Parameters Parameters Parameters   Standing lumbar extension 3x10  Over // bar as fulcrum  3x10  Over // bar as fulcrum     Prone press ups  3x10    3x10                                       Manual therapy: (30 minutes): Patient received patellar mobilizations in all directions, 10 second oscillations at end range x 10 each plane, knee in extension with towel roll under knee, patient supine, to improve flexibility at the PFJ and decrease pain; patient also received passive knee extension over-pressure to end range, 20 second oscillations in grade II-III, x 10 each, patient supine, to improve flexibility for better functional mobility   Patient also received manual STM to distal quadriceps and medial knee musculature to improve pain and decrease edema at the affected LE      Patient received vasopneumatic compression to L knee via GameReady, patient supine with LEs supported under wedge, medium compression, 40 degrees, performed to reduce pain and inflammation at the L knee, and to reduce edema, 15 minutes; Patient responded very well to treatment, noting decreased pain.      Treatment/Session Assessment:    · Response to Treatment:  Patient is responding very well to current treatment program. Stated his knee felt \"much better\" after STM today. · Compliance with Program/Exercises: Will assess as treatment progresses. · Recommendations/Intent for next treatment session: \"Next visit will focus on advancements to more challenging activities\".   Total Treatment Duration:  PT Patient Time In/Time Out  Time In: 1030  Time Out: 1115    Jimena Alcala, PT, DPT

## 2017-09-21 ENCOUNTER — HOSPITAL ENCOUNTER (OUTPATIENT)
Dept: PHYSICAL THERAPY | Age: 67
Discharge: HOME | End: 2017-09-21
Payer: MEDICARE

## 2017-09-21 NOTE — PROGRESS NOTES
Patient called to cancel today's PT session due to complications with his daughter's care.      Jez Gore, PT, DPT

## 2017-10-02 ENCOUNTER — HOSPITAL ENCOUNTER (OUTPATIENT)
Dept: PHYSICAL THERAPY | Age: 67
Discharge: HOME | End: 2017-10-02
Payer: MEDICARE

## 2017-10-03 ENCOUNTER — HOSPITAL ENCOUNTER (OUTPATIENT)
Dept: PHYSICAL THERAPY | Age: 67
Discharge: HOME | End: 2017-10-03
Payer: MEDICARE

## 2017-10-03 PROCEDURE — 97140 MANUAL THERAPY 1/> REGIONS: CPT

## 2017-10-03 NOTE — PROGRESS NOTES
Tracey Nix  : 1950 [facility] at [address] , [address], [place]  [phone]   [phone]          OUTPATIENT PHYSICAL THERAPY:Daily Note 10/3/2017    ICD-10: Treatment Diagnosis: Pain in left knee (M25.562); Low back pain (M54.5); Precautions/Allergies:   Review of patient's allergies indicates not on file. Fall Risk Score: 1 (? 5 = High Risk)  MD Orders: Evaluate and Treat MEDICAL/REFERRING DIAGNOSIS:  Unilateral primary osteoarthritis, left knee [M17.12]   DATE OF ONSET: Chronic   REFERRING PHYSICIAN: Soha Tovar MD  RETURN PHYSICIAN APPOINTMENT: 17     INITIAL ASSESSMENT:  Mr. Rojelio Gibbs presents in therapy with reports of chronic L knee and lower back pain. He reports his low back symptoms are worse than L knee pain. He reports a history of lower lumbar herniated disc. He will benefit from skilled PT in order to reduce symptoms for returning to his very active lifestyle and increase safety with functional mobility. PROBLEM LIST (Impacting functional limitations):  1. Decreased Strength  2. Decreased ADL/Functional Activities  3. Increased Pain  4. Decreased Activity Tolerance  5. Decreased Flexibility/Joint Mobility  6. Edema/Girth  7. Decreased Knowledge of Precautions  8. Decreased [place] with Home Exercise Program INTERVENTIONS PLANNED:  1. Cold  2. Heat  3. Home Exercise Program (HEP)  4. Manual Therapy  5. Neuromuscular Re-education/Strengthening  6. Range of Motion (ROM)  7. Therapeutic Activites  8. Therapeutic Exercise/Strengthening   TREATMENT PLAN:  Effective Dates: 8/15/17 TO 17. Frequency/Duration: 2 times a week for 6 weeks  GOALS: (Goals have been discussed and agreed upon with patient.)     SHORT-TERM FUNCTIONAL GOALS: Time Frame: 3 weeks  1. Patient will be compliant with HEP focused on lumbar stabilization and strength/ROM.    2.  Patient will rate low back pain no greater than 5/10 at worst for improved tolerance to daily and work activities. DISCHARGE GOALS: Time Frame: 6 weeks  1. Patient will be independent with comprehensive HEP focused on lumbar stabilization and core strengthening. 2.  Patient will rate low back pain no greater than 2/10 and which does not significantly interfere with daily or work duties. 3.  Patient will demonstrate lumbar AROM to be [facility] for improved safety with functional mobility. Rehabilitation Potential For Stated Goals: Good  Regarding Chris Freitas's therapy, I certify that the treatment plan above will be carried out by a therapist or under their direction. Thank you for this referral,  Sukhdev Haider, PT, DPT     Referring Physician Signature: Sarah Neri MD              Date                    The information in this section was collected on 8/15/17 (except where otherwise noted). HISTORY:   History of Present Injury/Illness (Reason for Referral):  Patient reports chronic history of L knee and low back pain. He states he may be undergoing L knee replacement next Spring. He reports his lower back pain is worse than the L knee, and has prevented him from returning to cycling and walking for fitness. He reports imaging demonstrated a lower lumbar herniated disc. Past Medical History/Comorbidities:   Mr. Jeff Carrington  has no past medical history on file. Mr. Jeff Carrington  has no past surgical history on file. Social History/Living Environment:     Independent   Prior Level of Function/Work/Activity:  Very active before pain - cycling and walkign  Personal Factors:          Sex:  male        Age:  74 y.o. Overall Behavior:  Very active and wishes to return     Current Medications:     No current outpatient prescriptions on file.    Date Last Reviewed:  10/3/2017     Number of Personal Factors/Comorbidities that affect the Plan of Care: 0: LOW COMPLEXITY   EXAMINATION:   Observation/Orthostatic Postural Assessment:          Patient appears in very good shape, slightly cautious when rising from a seated position; minimal to moderate edema at L knee  Palpation:          No tenderness at L knee or with central PA mobilizations throughout lumbosacral spinal segments   ROM:          L knee is 2-110; Lumbar motion is all WNL except standing extension which is 75%  Strength:          WNL throughout B/L LEs       Body Structures Involved:  1. Nerves  2. Bones  3. Joints  4. Muscles  5. Ligaments Body Functions Affected:  1. Sensory/Pain  2. Movement Related Activities and Participation Affected:  1. General Tasks and Demands  2. Mobility  3. Domestic Life  4. Interpersonal Interactions and Relationships  5. Community, Social and [place]   Number of elements (examined above) that affect the Plan of Care: 1-2: LOW COMPLEXITY   CLINICAL PRESENTATION:   Presentation: Stable and uncomplicated: LOW COMPLEXITY   CLINICAL DECISION MAKING:   Outcome Measure: Tool Used: Lower Extremity Functional Scale (LEFS)  Score:  Initial: 31/80 Most Recent: X/80 (Date: -- )   Interpretation of Score: 20 questions each scored on a 5 point scale with 0 representing \"extreme difficulty or unable to perform\" and 4 representing \"no difficulty\". The lower the score, the greater the functional disability. 80/80 represents no disability. Minimal detectable change is 9 points. Score 80 79-63 62-48 47-32 31-16 15-1 0   Modifier CH CI CJ CK CL CM CN     ? Mobility - Walking and Moving Around:     - CURRENT STATUS: CL - 60%-79% impaired, limited or restricted    - GOAL STATUS: CJ - 20%-39% impaired, limited or restricted    - D/C STATUS:  ---------------To be determined---------------      Medical Necessity:   · Patient is expected to demonstrate progress in strength, range of motion and functional technique to improve safety during functional mobility.   Reason for Services/Other Comments:  · Patient continues to require skilled intervention due to not reaching long term goals.   Use of outcome tool(s) and clinical judgement create a POC that gives a: Clear prediction of patient's progress: LOW COMPLEXITY            TREATMENT:   (In addition to Assessment/Re-Assessment sessions the following treatments were rendered)  Pre-treatment Symptoms/Complaints:  Patient reports due to increased stress and physical needs of his daughter with lifting and transfers, his back and knee pain have increased. Pain: Initial:   4/10 Post Session:  2/10     THERAPEUTIC EXERCISE: (0 minutes):  Exercises per grid below to improve mobility and strength. Required minimal verbal cues to promote proper body alignment, promote proper body posture and promote proper body mechanics. Progressed complexity of movement as indicated. Date:  8-17-17 Date:  8-22-17    Activity/Exercise Parameters Parameters Parameters   Standing lumbar extension 3x10  Over // bar as fulcrum  3x10  Over // bar as fulcrum     Prone press ups  3x10    3x10                                       Manual therapy: (40 minutes): Patient received patellar mobilizations in all directions, 10 second oscillations at end range x 10 each plane, knee in extension with towel roll under knee, patient supine, to improve flexibility at the PFJ and decrease pain; patient also received passive knee extension over-pressure to end range, 20 second oscillations in grade II-III, x 10 each, patient supine, to improve flexibility for better functional mobility   Patient also received manual STM to distal quadriceps and medial knee musculature to improve pain and decrease edema at the affected LE      Patient received IFC e-stim to lower back in right side lying, 5 minutes, to reduce pain at lower back (BILLED AS INDIRECT DUE TO INSURANCE RESTRICTION)    Treatment/Session Assessment:    · Response to Treatment:  Patient is responding very well to current treatment program.   · Compliance with Program/Exercises:  Will assess as treatment progresses. · Recommendations/Intent for next treatment session: \"Next visit will focus on advancements to more challenging activities\".   Total Treatment Duration:  PT Patient Time In/Time Out  Time In: 5792  Time Out: [address], PT, DPT

## 2017-10-09 ENCOUNTER — HOSPITAL ENCOUNTER (OUTPATIENT)
Dept: PHYSICAL THERAPY | Age: 67
Discharge: HOME | End: 2017-10-09
Payer: MEDICARE

## 2017-10-09 PROCEDURE — G8978 MOBILITY CURRENT STATUS: HCPCS

## 2017-10-09 PROCEDURE — 97140 MANUAL THERAPY 1/> REGIONS: CPT

## 2017-10-09 PROCEDURE — G8979 MOBILITY GOAL STATUS: HCPCS

## 2017-10-09 NOTE — PROGRESS NOTES
Amber Bruner  : 1950 [facility] at [facility]  [address], [address], [place]  [phone]   [phone]          OUTPATIENT PHYSICAL THERAPY:Daily Note and Recertification     ICD-10: Treatment Diagnosis: Pain in left knee (M25.562); Low back pain (M54.5); Precautions/Allergies:   Review of patient's allergies indicates not on file. Fall Risk Score: 1 (? 5 = High Risk)  MD Orders: Evaluate and Treat MEDICAL/REFERRING DIAGNOSIS:  Unilateral primary osteoarthritis, left knee [M17.12]   DATE OF ONSET: Chronic   REFERRING PHYSICIAN: Stefania Santiago MD  RETURN PHYSICIAN APPOINTMENT: 17     INITIAL ASSESSMENT:  Mr. Mariluz Miller initiated PT on 8-15-17 and has attended 9 out of 12 visits. He is reporting progress in his LE and back pain, and sees benefit from PT in his functional mobility. He will benefit from skilled PT in order to return to his previous level of function and activity levels. PROBLEM LIST (Impacting functional limitations):  1. Decreased Strength  2. Decreased ADL/Functional Activities  3. Increased Pain  4. Decreased Activity Tolerance  5. Decreased Flexibility/Joint Mobility  6. Edema/Girth  7. Decreased Knowledge of Precautions  8. Decreased [place] with Home Exercise Program INTERVENTIONS PLANNED:  1. Cold  2. Heat  3. Home Exercise Program (HEP)  4. Manual Therapy  5. Neuromuscular Re-education/Strengthening  6. Range of Motion (ROM)  7. Therapeutic Activites  8. Therapeutic Exercise/Strengthening   TREATMENT PLAN:  Effective Dates: 17 TO 17. Frequency/Duration: 2 times a week for 6 weeks  GOALS: (Goals have been discussed and agreed upon with patient.)     SHORT-TERM FUNCTIONAL GOALS: Time Frame: 3 weeks  1. Patient will be compliant with HEP focused on lumbar stabilization and strength/ROM. GOAL MET 10/9/2017  2.   Patient will rate low back pain no greater than 5/10 at worst for improved tolerance to daily and work activities. GOAL MET 10/9/2017  DISCHARGE GOALS: Time Frame: 6 weeks  1. Patient will be independent with comprehensive HEP focused on lumbar stabilization and core strengthening. 2.  Patient will rate low back pain no greater than 2/10 and which does not significantly interfere with daily or work duties. 3.  Patient will demonstrate lumbar AROM to be [facility] for improved safety with functional mobility. Rehabilitation Potential For Stated Goals: Good  Regarding Chris Freitas's therapy, I certify that the treatment plan above will be carried out by a therapist or under their direction. Thank you for this referral,  Abelardo Escudero PT, DPT     Referring Physician Signature: Macho Gonzalez MD              Date                    The information in this section was collected on 8/15/17 (except where otherwise noted). HISTORY:   History of Present Injury/Illness (Reason for Referral):  Patient reports chronic history of L knee and low back pain. He states he may be undergoing L knee replacement next Spring. He reports his lower back pain is worse than the L knee, and has prevented him from returning to cycling and walking for fitness. He reports imaging demonstrated a lower lumbar herniated disc. Past Medical History/Comorbidities:   Mr. Cecily Castellano  has no past medical history on file. Mr. Cecily Castellano  has no past surgical history on file. Social History/Living Environment:     Independent   Prior Level of Function/Work/Activity:  Very active before pain - cycling and walkign  Personal Factors:          Sex:  male        Age:  74 y.o. Overall Behavior:  Very active and wishes to return     Current Medications:       Current Outpatient Prescriptions:     insulin glargine (LANTUS SOLOSTAR) 100 unit/mL (3 mL) inpn, by SubCUTAneous route., Disp: , Rfl:     metFORMIN (GLUCOPHAGE) 500 mg tablet, Take  by mouth two (2) times daily (with meals). , Disp: , Rfl:     tamsulosin (FLOMAX) 0.4 mg capsule, Take 0.4 mg by mouth daily. , Disp: , Rfl:     raNITIdine (ZANTAC) 150 mg tablet, Take 150 mg by mouth two (2) times a day., Disp: , Rfl:    Date Last Reviewed:  10/9/2017     Number of Personal Factors/Comorbidities that affect the Plan of Care: 0: LOW COMPLEXITY   EXAMINATION:   Observation/Orthostatic Postural Assessment:          Patient appears in very good shape, slightly cautious when rising from a seated position; minimal to moderate edema at L knee  Palpation:          No tenderness at L knee or with central PA mobilizations throughout lumbosacral spinal segments   ROM:          L knee is 2-110; Lumbar motion is all WNL except standing extension which is 75%  Strength:          WNL throughout B/L LEs       Body Structures Involved:  1. Nerves  2. Bones  3. Joints  4. Muscles  5. Ligaments Body Functions Affected:  1. Sensory/Pain  2. Movement Related Activities and Participation Affected:  1. General Tasks and Demands  2. Mobility  3. Domestic Life  4. Interpersonal Interactions and Relationships  5. Community, Social and [place]   Number of elements (examined above) that affect the Plan of Care: 1-2: LOW COMPLEXITY   CLINICAL PRESENTATION:   Presentation: Stable and uncomplicated: LOW COMPLEXITY   CLINICAL DECISION MAKING:   Outcome Measure: Tool Used: Lower Extremity Functional Scale (LEFS)  Score:  Initial: 31/80 Most Recent: 27/80 (Date: 10-9-17 )   Interpretation of Score: 20 questions each scored on a 5 point scale with 0 representing \"extreme difficulty or unable to perform\" and 4 representing \"no difficulty\". The lower the score, the greater the functional disability. 80/80 represents no disability. Minimal detectable change is 9 points. Score 80 79-63 62-48 47-32 31-16 15-1 0   Modifier CH CI CJ CK CL CM CN     ?  Mobility - Walking and Moving Around:     - CURRENT STATUS: CL - 60%-79% impaired, limited or restricted    - GOAL STATUS: CJ - 20%-39% impaired, limited or restricted    - D/C STATUS:  ---------------To be determined---------------      Medical Necessity:   · Patient is expected to demonstrate progress in strength, range of motion and functional technique to improve safety during functional mobility. Reason for Services/Other Comments:  · Patient continues to require skilled intervention due to not reaching long term goals. Use of outcome tool(s) and clinical judgement create a POC that gives a: Clear prediction of patient's progress: LOW COMPLEXITY            TREATMENT:   (In addition to Assessment/Re-Assessment sessions the following treatments were rendered)  Pre-treatment Symptoms/Complaints:  Patient reports he had to put his daughter into hospice over the weekend, and is emotional at times talking about this. His L posterior knee capsule swelling appears improved though. Pain: Initial:   4/10 Post Session:  2/10     THERAPEUTIC EXERCISE: (0 minutes):  Exercises per grid below to improve mobility and strength. Required minimal verbal cues to promote proper body alignment, promote proper body posture and promote proper body mechanics. Progressed complexity of movement as indicated.    Date:  8-17-17 Date:  8-22-17    Activity/Exercise Parameters Parameters Parameters   Standing lumbar extension 3x10  Over // bar as fulcrum  3x10  Over // bar as fulcrum     Prone press ups  3x10    3x10                                       Manual therapy: (40 minutes): Patient received patellar mobilizations in all directions, 10 second oscillations at end range x 10 each plane, knee in extension with towel roll under knee, patient supine, to improve flexibility at the PFJ and decrease pain; patient also received passive knee extension over-pressure to end range, 20 second oscillations in grade II-III, x 10 each, patient supine, to improve flexibility for better functional mobility   Patient also received manual STM to distal quadriceps and medial knee musculature to improve pain and decrease edema at the affected LE      Patient received IFC e-stim to lower back in right side lying, 5 minutes, to reduce pain at lower back (BILLED AS INDIRECT DUE TO INSURANCE RESTRICTION)    Treatment/Session Assessment:    · Response to Treatment:  Patient is responding very well to current treatment program.   · Compliance with Program/Exercises: Will assess as treatment progresses. · Recommendations/Intent for next treatment session: \"Next visit will focus on advancements to more challenging activities\".   Total Treatment Duration:  PT Patient Time In/Time Out  Time In: 5067  Time Out: [address], PT, DPT

## 2017-10-12 ENCOUNTER — APPOINTMENT (OUTPATIENT)
Dept: PHYSICAL THERAPY | Age: 67
End: 2017-10-12
Payer: MEDICARE

## 2017-10-16 ENCOUNTER — HOSPITAL ENCOUNTER (OUTPATIENT)
Dept: PHYSICAL THERAPY | Age: 67
End: 2017-10-16
Payer: MEDICARE

## 2017-10-17 ENCOUNTER — HOSPITAL ENCOUNTER (OUTPATIENT)
Dept: PHYSICAL THERAPY | Age: 67
Discharge: HOME | End: 2017-10-17
Payer: MEDICARE

## 2017-10-17 NOTE — PROGRESS NOTES
Patient called earlier this week, to inform us that his daughter had passed from her long bout with cancer. He suspected he would take this week off from PT and resume next week.      Milton Robbins, PT, DPT

## 2017-10-23 ENCOUNTER — HOSPITAL ENCOUNTER (OUTPATIENT)
Dept: PHYSICAL THERAPY | Age: 67
Discharge: HOME | End: 2017-10-23
Payer: MEDICARE

## 2017-10-23 PROCEDURE — 97140 MANUAL THERAPY 1/> REGIONS: CPT

## 2017-10-23 NOTE — PROGRESS NOTES
Sandra De Paz  : 1950 [facility]  [address], [address], [place]  [phone]   [phone]          OUTPATIENT PHYSICAL THERAPY:Daily Note 10/23/2017    ICD-10: Treatment Diagnosis: Pain in left knee (M25.562); Low back pain (M54.5); Precautions/Allergies:   Review of patient's allergies indicates not on file. Fall Risk Score: 1 (? 5 = High Risk)  MD Orders: Evaluate and Treat MEDICAL/REFERRING DIAGNOSIS:  Unilateral primary osteoarthritis, left knee [M17.12]   DATE OF ONSET: Chronic   REFERRING PHYSICIAN: Jadyn Caraballo MD  RETURN PHYSICIAN APPOINTMENT: 17     INITIAL ASSESSMENT:  Mr. Joel Hinkle initiated PT on 8-15-17 and has attended 9 out of 12 visits. He is reporting progress in his LE and back pain, and sees benefit from PT in his functional mobility. He will benefit from skilled PT in order to return to his previous level of function and activity levels. PROBLEM LIST (Impacting functional limitations):  1. Decreased Strength  2. Decreased ADL/Functional Activities  3. Increased Pain  4. Decreased Activity Tolerance  5. Decreased Flexibility/Joint Mobility  6. Edema/Girth  7. Decreased Knowledge of Precautions  8. Decreased [place] with Home Exercise Program INTERVENTIONS PLANNED:  1. Cold  2. Heat  3. Home Exercise Program (HEP)  4. Manual Therapy  5. Neuromuscular Re-education/Strengthening  6. Range of Motion (ROM)  7. Therapeutic Activites  8. Therapeutic Exercise/Strengthening   TREATMENT PLAN:  Effective Dates: 17 TO 17. Frequency/Duration: 2 times a week for 6 weeks  GOALS: (Goals have been discussed and agreed upon with patient.)     SHORT-TERM FUNCTIONAL GOALS: Time Frame: 3 weeks  1. Patient will be compliant with HEP focused on lumbar stabilization and strength/ROM. GOAL MET 10/23/2017  2.   Patient will rate low back pain no greater than 5/10 at worst for improved tolerance to daily and work activities. GOAL MET 10/23/2017  DISCHARGE GOALS: Time Frame: 6 weeks  1. Patient will be independent with comprehensive HEP focused on lumbar stabilization and core strengthening. 2.  Patient will rate low back pain no greater than 2/10 and which does not significantly interfere with daily or work duties. 3.  Patient will demonstrate lumbar AROM to be [facility] for improved safety with functional mobility. Rehabilitation Potential For Stated Goals: Good  Regarding Chris RONNI Vasquezam's therapy, I certify that the treatment plan above will be carried out by a therapist or under their direction. Thank you for this referral,  Jackelin Khanna, PT, DPT     Referring Physician Signature: Sari Roldan MD              Date                    The information in this section was collected on 8/15/17 (except where otherwise noted). HISTORY:   History of Present Injury/Illness (Reason for Referral):  Patient reports chronic history of L knee and low back pain. He states he may be undergoing L knee replacement next Spring. He reports his lower back pain is worse than the L knee, and has prevented him from returning to cycling and walking for fitness. He reports imaging demonstrated a lower lumbar herniated disc. Past Medical History/Comorbidities:   Mr. Magnolia Jacobsen  has no past medical history on file. Mr. Magnolia Jacobsen  has no past surgical history on file. Social History/Living Environment:     Independent   Prior Level of Function/Work/Activity:  Very active before pain - cycling and walkign  Personal Factors:          Sex:  male        Age:  74 y.o. Overall Behavior:  Very active and wishes to return     Current Medications:       Current Outpatient Prescriptions:     insulin glargine (LANTUS SOLOSTAR) 100 unit/mL (3 mL) inpn, by SubCUTAneous route., Disp: , Rfl:     metFORMIN (GLUCOPHAGE) 500 mg tablet, Take  by mouth two (2) times daily (with meals). , Disp: , Rfl:     tamsulosin (FLOMAX) 0.4 mg capsule, Take 0.4 mg by mouth daily. , Disp: , Rfl:     raNITIdine (ZANTAC) 150 mg tablet, Take 150 mg by mouth two (2) times a day., Disp: , Rfl:    Date Last Reviewed:  10/23/2017     Number of Personal Factors/Comorbidities that affect the Plan of Care: 0: LOW COMPLEXITY   EXAMINATION:   Observation/Orthostatic Postural Assessment:          Patient appears in very good shape, slightly cautious when rising from a seated position; minimal to moderate edema at L knee  Palpation:          No tenderness at L knee or with central PA mobilizations throughout lumbosacral spinal segments   ROM:          L knee is 2-110; Lumbar motion is all WNL except standing extension which is 75%  Strength:          WNL throughout B/L LEs       Body Structures Involved:  1. Nerves  2. Bones  3. Joints  4. Muscles  5. Ligaments Body Functions Affected:  1. Sensory/Pain  2. Movement Related Activities and Participation Affected:  1. General Tasks and Demands  2. Mobility  3. Domestic Life  4. Interpersonal Interactions and Relationships  5. Community, Social and [place]   Number of elements (examined above) that affect the Plan of Care: 1-2: LOW COMPLEXITY   CLINICAL PRESENTATION:   Presentation: Stable and uncomplicated: LOW COMPLEXITY   CLINICAL DECISION MAKING:   Outcome Measure: Tool Used: Lower Extremity Functional Scale (LEFS)  Score:  Initial: 31/80 Most Recent: 27/80 (Date: 10-9-17 )   Interpretation of Score: 20 questions each scored on a 5 point scale with 0 representing \"extreme difficulty or unable to perform\" and 4 representing \"no difficulty\". The lower the score, the greater the functional disability. 80/80 represents no disability. Minimal detectable change is 9 points. Score 80 79-63 62-48 47-32 31-16 15-1 0   Modifier CH CI CJ CK CL CM CN     ?  Mobility - Walking and Moving Around:     - CURRENT STATUS: CL - 60%-79% impaired, limited or restricted    - GOAL STATUS: CJ - 20%-39% impaired, limited or restricted    - D/C STATUS:  ---------------To be determined---------------      Medical Necessity:   · Patient is expected to demonstrate progress in strength, range of motion and functional technique to improve safety during functional mobility. Reason for Services/Other Comments:  · Patient continues to require skilled intervention due to not reaching long term goals. Use of outcome tool(s) and clinical judgement create a POC that gives a: Clear prediction of patient's progress: LOW COMPLEXITY            TREATMENT:   (In addition to Assessment/Re-Assessment sessions the following treatments were rendered)  Pre-treatment Symptoms/Complaints:  Patient reports he is doing OK after the passing of his daughter, has re-joined a local gym, and exercised this morning without aggravating his back or knee. Pain: Initial:   4/10 Post Session:  2/10     THERAPEUTIC EXERCISE: (0 minutes):  Exercises per grid below to improve mobility and strength. Required minimal verbal cues to promote proper body alignment, promote proper body posture and promote proper body mechanics. Progressed complexity of movement as indicated.    Date:  8-17-17 Date:  8-22-17    Activity/Exercise Parameters Parameters Parameters   Standing lumbar extension 3x10  Over // bar as fulcrum  3x10  Over // bar as fulcrum     Prone press ups  3x10    3x10                                       Manual therapy: (40 minutes): Patient received patellar mobilizations in all directions, 10 second oscillations at end range x 10 each plane, knee in extension with towel roll under knee, patient supine, to improve flexibility at the PFJ and decrease pain; patient also received passive knee extension over-pressure to end range, 20 second oscillations in grade II-III, x 10 each, patient supine, to improve flexibility for better functional mobility   Patient also received manual STM to distal quadriceps and medial knee musculature to improve pain and decrease edema at the affected LE      Patient received IFC e-stim to lower back in right side lying, 5 minutes, to reduce pain at lower back (BILLED AS INDIRECT DUE TO INSURANCE RESTRICTION)    Treatment/Session Assessment:    · Response to Treatment:  Patient is responding very well to current treatment program.   · Compliance with Program/Exercises: Will assess as treatment progresses. · Recommendations/Intent for next treatment session: \"Next visit will focus on advancements to more challenging activities\".   Total Treatment Duration:  PT Patient Time In/Time Out  Time In: 3051  Time Out: 0128 Blake Enrique, PT, DPT

## 2017-10-26 ENCOUNTER — APPOINTMENT (OUTPATIENT)
Dept: PHYSICAL THERAPY | Age: 67
End: 2017-10-26
Payer: MEDICARE

## 2017-10-30 ENCOUNTER — APPOINTMENT (OUTPATIENT)
Dept: PHYSICAL THERAPY | Age: 67
End: 2017-10-30
Payer: MEDICARE

## 2017-11-02 ENCOUNTER — HOSPITAL ENCOUNTER (OUTPATIENT)
Dept: PHYSICAL THERAPY | Age: 67
Discharge: HOME | End: 2017-11-02
Payer: MEDICARE

## 2017-11-02 PROCEDURE — 97140 MANUAL THERAPY 1/> REGIONS: CPT

## 2017-11-02 PROCEDURE — 97016 VASOPNEUMATIC DEVICE THERAPY: CPT

## 2017-11-02 NOTE — PROGRESS NOTES
Austin Menjivar  : 1950 [facility]  [address], [address], [place]  [phone]   [phone]          OUTPATIENT PHYSICAL THERAPY:Daily Note 2017    ICD-10: Treatment Diagnosis: Pain in left knee (M25.562); Low back pain (M54.5); Precautions/Allergies:   Review of patient's allergies indicates not on file. Fall Risk Score: 1 (? 5 = High Risk)  MD Orders: Evaluate and Treat MEDICAL/REFERRING DIAGNOSIS:  Unilateral primary osteoarthritis, left knee [M17.12]   DATE OF ONSET: Chronic   REFERRING PHYSICIAN: Malcom Ormond, MD  RETURN PHYSICIAN APPOINTMENT: 17     INITIAL ASSESSMENT:  Mr. Patt Lesch initiated PT on 8-15-17 and has attended 9 out of 12 visits. He is reporting progress in his LE and back pain, and sees benefit from PT in his functional mobility. He will benefit from skilled PT in order to return to his previous level of function and activity levels. PROBLEM LIST (Impacting functional limitations):  1. Decreased Strength  2. Decreased ADL/Functional Activities  3. Increased Pain  4. Decreased Activity Tolerance  5. Decreased Flexibility/Joint Mobility  6. Edema/Girth  7. Decreased Knowledge of Precautions  8. Decreased [place] with Home Exercise Program INTERVENTIONS PLANNED:  1. Cold  2. Heat  3. Home Exercise Program (HEP)  4. Manual Therapy  5. Neuromuscular Re-education/Strengthening  6. Range of Motion (ROM)  7. Therapeutic Activites  8. Therapeutic Exercise/Strengthening   TREATMENT PLAN:  Effective Dates: 17 TO 17. Frequency/Duration: 2 times a week for 6 weeks  GOALS: (Goals have been discussed and agreed upon with patient.)     SHORT-TERM FUNCTIONAL GOALS: Time Frame: 3 weeks  1. Patient will be compliant with HEP focused on lumbar stabilization and strength/ROM. GOAL MET 2017  2.   Patient will rate low back pain no greater than 5/10 at worst for improved tolerance to daily and work activities. GOAL MET 11/2/2017  DISCHARGE GOALS: Time Frame: 6 weeks  1. Patient will be independent with comprehensive HEP focused on lumbar stabilization and core strengthening. 2.  Patient will rate low back pain no greater than 2/10 and which does not significantly interfere with daily or work duties. 3.  Patient will demonstrate lumbar AROM to be [facility] for improved safety with functional mobility. Rehabilitation Potential For Stated Goals: Good  Regarding Chris Vasquezam's therapy, I certify that the treatment plan above will be carried out by a therapist or under their direction. Thank you for this referral,  Shilpa Worthington, PT, DPT     Referring Physician Signature: Abeba Winston MD              Date                    The information in this section was collected on 8/15/17 (except where otherwise noted). HISTORY:   History of Present Injury/Illness (Reason for Referral):  Patient reports chronic history of L knee and low back pain. He states he may be undergoing L knee replacement next Spring. He reports his lower back pain is worse than the L knee, and has prevented him from returning to cycling and walking for fitness. He reports imaging demonstrated a lower lumbar herniated disc. Past Medical History/Comorbidities:   Mr. Megan Hedrick  has no past medical history on file. Mr. Megan Hedrick  has no past surgical history on file. Social History/Living Environment:     Independent   Prior Level of Function/Work/Activity:  Very active before pain - cycling and walkign  Personal Factors:          Sex:  male        Age:  74 y.o. Overall Behavior:  Very active and wishes to return     Current Medications:       Current Outpatient Prescriptions:     insulin glargine (LANTUS SOLOSTAR) 100 unit/mL (3 mL) inpn, by SubCUTAneous route., Disp: , Rfl:     metFORMIN (GLUCOPHAGE) 500 mg tablet, Take  by mouth two (2) times daily (with meals). , Disp: , Rfl:     tamsulosin (FLOMAX) 0.4 mg capsule, Take 0.4 mg by mouth daily. , Disp: , Rfl:     raNITIdine (ZANTAC) 150 mg tablet, Take 150 mg by mouth two (2) times a day., Disp: , Rfl:    Date Last Reviewed:  11/2/2017     Number of Personal Factors/Comorbidities that affect the Plan of Care: 0: LOW COMPLEXITY   EXAMINATION:   Observation/Orthostatic Postural Assessment:          Patient appears in very good shape, slightly cautious when rising from a seated position; minimal to moderate edema at L knee  Palpation:          No tenderness at L knee or with central PA mobilizations throughout lumbosacral spinal segments   ROM:          L knee is 2-110; Lumbar motion is all WNL except standing extension which is 75%  Strength:          WNL throughout B/L LEs       Body Structures Involved:  1. Nerves  2. Bones  3. Joints  4. Muscles  5. Ligaments Body Functions Affected:  1. Sensory/Pain  2. Movement Related Activities and Participation Affected:  1. General Tasks and Demands  2. Mobility  3. Domestic Life  4. Interpersonal Interactions and Relationships  5. Community, Social and [place]   Number of elements (examined above) that affect the Plan of Care: 1-2: LOW COMPLEXITY   CLINICAL PRESENTATION:   Presentation: Stable and uncomplicated: LOW COMPLEXITY   CLINICAL DECISION MAKING:   Outcome Measure: Tool Used: Lower Extremity Functional Scale (LEFS)  Score:  Initial: 31/80 Most Recent: 27/80 (Date: 10-9-17 )   Interpretation of Score: 20 questions each scored on a 5 point scale with 0 representing \"extreme difficulty or unable to perform\" and 4 representing \"no difficulty\". The lower the score, the greater the functional disability. 80/80 represents no disability. Minimal detectable change is 9 points. Score 80 79-63 62-48 47-32 31-16 15-1 0   Modifier CH CI CJ CK CL CM CN     ?  Mobility - Walking and Moving Around:     - CURRENT STATUS: CL - 60%-79% impaired, limited or restricted    - GOAL STATUS: CJ - 20%-39% impaired, limited or restricted    - D/C STATUS:  ---------------To be determined---------------      Medical Necessity:   · Patient is expected to demonstrate progress in strength, range of motion and functional technique to improve safety during functional mobility. Reason for Services/Other Comments:  · Patient continues to require skilled intervention due to not reaching long term goals. Use of outcome tool(s) and clinical judgement create a POC that gives a: Clear prediction of patient's progress: LOW COMPLEXITY            TREATMENT:   (In addition to Assessment/Re-Assessment sessions the following treatments were rendered)  Pre-treatment Symptoms/Complaints:  Patient reports he is experiencing greater L knee pain, but trying to get back into exercise. Pain: Initial:   4/10 Post Session:  2/10     THERAPEUTIC EXERCISE: (0 minutes):  Exercises per grid below to improve mobility and strength. Required minimal verbal cues to promote proper body alignment, promote proper body posture and promote proper body mechanics. Progressed complexity of movement as indicated.    Date:  8-17-17 Date:  8-22-17 Date     Activity/Exercise Parameters Parameters Parameters   Standing lumbar extension 3x10  Over // bar as fulcrum  3x10  Over // bar as fulcrum     Prone press ups  3x10    3x10                                       Manual therapy: (30 minutes): Patient received patellar mobilizations in all directions, 10 second oscillations at end range x 10 each plane, knee in extension with towel roll under knee, patient supine, to improve flexibility at the PFJ and decrease pain; patient also received passive knee extension over-pressure to end range, 20 second oscillations in grade II-III, x 10 each, patient supine, to improve flexibility for better functional mobility   Patient also received manual STM to distal quadriceps and medial knee musculature to improve pain and decrease edema at the affected LE    Patient received vasopneumatic compression via [employer] to L knee, LEs supported under wedge, patient supine, 15 minutes with medium compression, 40 degrees, performed to reduce posterior capsule edema. Treatment/Session Assessment:    · Response to Treatment:  Patient is responding very well to current treatment program. Instructed to perform aquatic-based exercises at his gym to reduce weight bearing on LEs  · Compliance with Program/Exercises: Will assess as treatment progresses. · Recommendations/Intent for next treatment session: \"Next visit will focus on advancements to more challenging activities\".   Total Treatment Duration:  PT Patient Time In/Time Out  Time In: 6463  Time Out: [address] [facility], PT, DPT

## 2017-11-06 ENCOUNTER — APPOINTMENT (OUTPATIENT)
Dept: PHYSICAL THERAPY | Age: 67
End: 2017-11-06
Payer: MEDICARE

## 2017-11-13 ENCOUNTER — APPOINTMENT (OUTPATIENT)
Dept: PHYSICAL THERAPY | Age: 67
End: 2017-11-13
Payer: MEDICARE

## 2017-11-20 ENCOUNTER — APPOINTMENT (OUTPATIENT)
Dept: PHYSICAL THERAPY | Age: 67
End: 2017-11-20
Payer: MEDICARE

## 2017-11-21 ENCOUNTER — APPOINTMENT (OUTPATIENT)
Dept: PHYSICAL THERAPY | Age: 67
End: 2017-11-21
Payer: MEDICARE

## 2017-11-27 ENCOUNTER — APPOINTMENT (OUTPATIENT)
Dept: PHYSICAL THERAPY | Age: 67
End: 2017-11-27
Payer: MEDICARE

## 2017-11-30 ENCOUNTER — APPOINTMENT (OUTPATIENT)
Dept: PHYSICAL THERAPY | Age: 67
End: 2017-11-30
Payer: MEDICARE

## 2019-01-22 ENCOUNTER — HOSPITAL ENCOUNTER (OUTPATIENT)
Dept: PHYSICAL THERAPY | Age: 69
Discharge: HOME | End: 2019-01-22
Payer: MEDICARE

## 2019-01-22 PROCEDURE — 97161 PT EVAL LOW COMPLEX 20 MIN: CPT
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
## 2019-04-09 ENCOUNTER — HOSPITAL ENCOUNTER (OUTPATIENT)
Dept: PHYSICAL THERAPY | Age: 69
Discharge: HOME | End: 2019-04-09
Payer: MEDICARE

## 2019-04-09 ENCOUNTER — HOME HEALTH ADMISSION (OUTPATIENT)
Dept: HOME HEALTH SERVICES | Facility: HOME HEALTH | Age: 69
End: 2019-04-09

## 2019-04-09 ENCOUNTER — HOSPITAL ENCOUNTER (OUTPATIENT)
Dept: SURGERY | Age: 69
Discharge: HOME | End: 2019-04-09
Payer: MEDICARE

## 2019-04-09 LAB
ANION GAP SERPL CALC-SCNC: 8 MMOL/L
APPEARANCE UR: CLEAR
APTT PPP: 30 SEC (ref 24.7–39.8)
ATRIAL RATE: 58 BPM
BACTERIA SPEC CULT: NORMAL
BACTERIA URNS QL MICRO: 0 /HPF
BASOPHILS # BLD: 0 K/UL (ref 0–0.2)
BASOPHILS NFR BLD: 1 % (ref 0–2)
BILIRUB UR QL: NEGATIVE
BUN SERPL-MCNC: 17 MG/DL (ref 8–23)
CALCIUM SERPL-MCNC: 9 MG/DL (ref 8.3–10.4)
CALCULATED P AXIS, ECG09: 47 DEGREES
CALCULATED R AXIS, ECG10: 37 DEGREES
CALCULATED T AXIS, ECG11: 40 DEGREES
CASTS URNS QL MICRO: 0 /LPF
CHLORIDE SERPL-SCNC: 107 MMOL/L (ref 98–107)
CO2 SERPL-SCNC: 25 MMOL/L (ref 21–32)
COLOR UR: YELLOW
CREAT SERPL-MCNC: 0.86 MG/DL (ref 0.8–1.5)
DIAGNOSIS, 93000: NORMAL
DIFFERENTIAL METHOD BLD: ABNORMAL
EOSINOPHIL # BLD: 0 K/UL (ref 0–0.8)
EOSINOPHIL NFR BLD: 1 % (ref 0.5–7.8)
EPI CELLS #/AREA URNS HPF: ABNORMAL /HPF
ERYTHROCYTE [DISTWIDTH] IN BLOOD BY AUTOMATED COUNT: 14.4 % (ref 11.9–14.6)
GLUCOSE SERPL-MCNC: 86 MG/DL (ref 65–100)
GLUCOSE UR STRIP.AUTO-MCNC: NEGATIVE MG/DL
HCT VFR BLD AUTO: 40.2 % (ref 41.1–50.3)
HGB BLD-MCNC: 13.1 G/DL (ref 13.6–17.2)
HGB UR QL STRIP: ABNORMAL
IMM GRANULOCYTES # BLD AUTO: 0 K/UL (ref 0–0.5)
IMM GRANULOCYTES NFR BLD AUTO: 0 % (ref 0–5)
INR PPP: 1
KETONES UR QL STRIP.AUTO: NEGATIVE MG/DL
LEUKOCYTE ESTERASE UR QL STRIP.AUTO: NEGATIVE
LYMPHOCYTES # BLD: 2.1 K/UL (ref 0.5–4.6)
LYMPHOCYTES NFR BLD: 55 % (ref 13–44)
MCH RBC QN AUTO: 28.3 PG (ref 26.1–32.9)
MCHC RBC AUTO-ENTMCNC: 32.6 G/DL (ref 31.4–35)
MCV RBC AUTO: 86.8 FL (ref 79.6–97.8)
MONOCYTES # BLD: 0.5 K/UL (ref 0.1–1.3)
MONOCYTES NFR BLD: 14 % (ref 4–12)
NEUTS SEG # BLD: 1.1 K/UL (ref 1.7–8.2)
NEUTS SEG NFR BLD: 28 % (ref 43–78)
NITRITE UR QL STRIP.AUTO: NEGATIVE
NRBC # BLD: 0 K/UL (ref 0–0.2)
P-R INTERVAL, ECG05: 186 MS
PH UR STRIP: 6 [PH] (ref 5–9)
PLATELET # BLD AUTO: 176 K/UL (ref 150–450)
PMV BLD AUTO: 9.3 FL (ref 9.4–12.3)
POTASSIUM SERPL-SCNC: 4.3 MMOL/L (ref 3.5–5.1)
PROT UR STRIP-MCNC: NEGATIVE MG/DL
PROTHROMBIN TIME: 13.4 SEC (ref 11.7–14.5)
Q-T INTERVAL, ECG07: 436 MS
QRS DURATION, ECG06: 88 MS
QTC CALCULATION (BEZET), ECG08: 428 MS
RBC # BLD AUTO: 4.63 M/UL (ref 4.23–5.6)
RBC #/AREA URNS HPF: ABNORMAL /HPF
SERVICE CMNT-IMP: NORMAL
SODIUM SERPL-SCNC: 140 MMOL/L (ref 136–145)
SP GR UR REFRACTOMETRY: 1.02 (ref 1–1.02)
UROBILINOGEN UR QL STRIP.AUTO: 1 EU/DL (ref 0.2–1)
VENTRICULAR RATE, ECG03: 58 BPM
WBC # BLD AUTO: 3.7 K/UL (ref 4.3–11.1)
WBC URNS QL MICRO: ABNORMAL /HPF

## 2019-04-09 PROCEDURE — 36415 COLL VENOUS BLD VENIPUNCTURE: CPT

## 2019-04-09 PROCEDURE — 93005 ELECTROCARDIOGRAM TRACING: CPT | Performed by: ANESTHESIOLOGY

## 2019-04-09 PROCEDURE — 97161 PT EVAL LOW COMPLEX 20 MIN: CPT

## 2019-04-09 PROCEDURE — 87641 MR-STAPH DNA AMP PROBE: CPT

## 2019-04-09 PROCEDURE — 81001 URINALYSIS AUTO W/SCOPE: CPT

## 2019-04-09 PROCEDURE — 85730 THROMBOPLASTIN TIME PARTIAL: CPT

## 2019-04-09 PROCEDURE — 80048 BASIC METABOLIC PNL TOTAL CA: CPT

## 2019-04-09 PROCEDURE — 85610 PROTHROMBIN TIME: CPT

## 2019-04-09 PROCEDURE — 85025 COMPLETE CBC W/AUTO DIFF WBC: CPT

## 2019-04-09 RX ORDER — LANOLIN ALCOHOL/MO/W.PET/CERES
1000 CREAM (GRAM) TOPICAL DAILY
COMMUNITY
End: 2022-05-10

## 2019-04-09 RX ORDER — INSULIN GLARGINE 100 [IU]/ML
12 INJECTION, SOLUTION SUBCUTANEOUS
COMMUNITY

## 2019-04-09 RX ORDER — LOSARTAN POTASSIUM 50 MG/1
25 TABLET ORAL
COMMUNITY

## 2019-04-09 RX ORDER — FLUTICASONE PROPIONATE 50 MCG
2 SPRAY, SUSPENSION (ML) NASAL
COMMUNITY

## 2019-04-09 NOTE — PROGRESS NOTES
SW met with pt in Prehab to discuss Left TKA scheduled for 5/1/19. Pt was considering going to [address] at [place] because he lives alone. SW explained to pt he would be independent wit hmobility and only need assistance with meals and household help which pt states he has help with. Pt has a medicare advantage plan and SW explained about precert from insurance. SW called and [facility] will not accept [facility]- SW informed pt. Pt states he will return home. Pt resides in [facility] and is agreeable to [facility]. [facility] referral completed. Pt needs RW and BSC, SW ordered and delivered this to pt today from [facility] while he still has his secondary insurance (it expires 5/1). No additional needs or questions identified at this time. Michelle Pardo

## 2019-04-09 NOTE — PROGRESS NOTES
Malissa Parry 
: 7573(68 y.o.) [address] at [address], [address] [place] [address] [phone] Physical Therapy Prehab Plan of Treatment and Evaluation Summary:2019 ICD-10: Treatment Diagnosis:  
· Pain in Left Knee (M25.562) · Stiffness of Left Knee, Not elsewhere classified (M25.662) Precautions/Allergies:  
Patient has no allergy information on record. MEDICAL/REFERRING DIAGNOSIS: 
Unilateral primary osteoarthritis, left knee [M17.12] REFERRING PHYSICIAN: Nicholas Boland MD 
DATE OF SURGERY: 19 Assessment:  
Comments:  He lives alone and plans on having his dtr help him at [place]. I now hear him talking to the  about the possibility of STR. He is motivated and prepared for surgery. PROBLEM LIST (Impacting functional limitations): 
Mr. Fam García presents with the following left lower extremity(s) problems: 1. Strength 2. Range of Motion 3. Home Exercise Program 
4. Pain INTERVENTIONS PLANNED: 
1. Home Exercise Program 
2. Educational Discussion TREATMENT PLAN: Effective Dates: 2019 TO 2019. Frequency/Duration: Patient to continue to perform home exercise program at least twice per day up until his surgery. GOALS: (Goals have been discussed and agreed upon with patient.) Discharge Goals: Time Frame: 1 Day 1. Patient will demonstrate independence with a home exercise program designed to increase strength, range of motion and pain control to minimize functional deficits and optimize patient for total joint replacement. Rehabilitation Potential For Stated Goals: Good Regarding Chris Freitas's therapy, I certify that the treatment plan above will be carried out by a therapist or under their direction. Thank you for this referral, Paris Alonso, PT     
    
 
 
HISTORY:  
Present Symptoms: 
Pain Intensity 1: 10(at its worst) Pain Location 1: Knee Pain Orientation 1: Left, Posterior History of Present Injury/Illness (Reason for Referral): 
Medical/Referring Diagnosis: Unilateral primary osteoarthritis, left knee [M17.12] Past Medical History/Comorbidities:  
Mr. Jose Mosquera  has no past medical history on file. Mr. Jose Mosquera  has no past surgical history on file. Social History/Living Environment:  
Home Environment: Private residence # Steps to Enter: 1 Rails to Enter: No 
One/Two Story Residence: One story Living Alone: Yes Support Systems: Child(mick) Patient Expects to be Discharged to[de-identified] Private residence Current DME Used/Available at Home: Cane, straight, Crutches Tub or Shower Type: Tub/Shower combination Work/Activity: 
retired Dominant Side: 
RIGHT Current Medications:  See Pre-assessment nursing note Number of Personal Factors/Comorbidities that affect the Plan of Care: 0: LOW COMPLEXITY EXAMINATION:  
ADLs (Current Functional Status):  
Ambulation: 
[x] Independent 
[] Walk Indoors Only 
[x] Walk Outdoors [] Use Assistive Device [] Use Wheelchair Only Dressing: 
[x] Independent Requires Assistance from Someone for: 
[] Sock/Shoes 
[] Pants 
[] Everything Bathing/Showering:  
[x] Independent 
[] Requires Assistance from Someone 
[] Sponge Bath Only Household Activities: 
[x] Routine house and yard work 
[] Light Housework Only 
[] None Observation/Orthostatic Postural Assessment:  
 Genu valgus left, Leg length discrepancy, left(L LE 1/4\" shorter than R) ROM/Flexibility:  
AROM: Within functional limits(R LE) LLE AROM 
L Knee Flexion: 105 L Knee Extension: 10 Strength:  
Strength: Within functional limits(R LE) LLE Strength L Hip Flexion: 4 L Knee Extension: 4 L Ankle Dorsiflexion: 5 Functional Mobility:   
Sensation: Intact(R LE) Stand to Sit: Independent Sit to Stand: Independent Stand Pivot Transfers: Independent Distance (ft): 1000 Feet (ft) Ambulation - Level of Assistance: Independent Speed/Tamar: Pace decreased (<100 feet/min) Gait Abnormalities: Antalgic Balance:   
Sitting: Intact Standing: Intact Body Structures Involved: 1. Bones 2. Joints 3. Muscles Body Functions Affected: 1. Neuromusculoskeletal 
2. Movement Related Activities and Participation Affected: 1. General Tasks and Demands 2. Mobility Number of elements that affect the Plan of Care: 4+: HIGH COMPLEXITY CLINICAL PRESENTATION:  
Presentation: Stable and uncomplicated: LOW COMPLEXITY CLINICAL DECISION MAKING:  
Outcome Measure: Tool Used: Lower Extremity Functional Scale (LEFS) Score:  Initial: 59/80 Most Recent: X/80 (Date: -- ) Interpretation of Score: 20 questions each scored on a 5 point scale with 0 representing \"extreme difficulty or unable to perform\" and 4 representing \"no difficulty\". The lower the score, the greater the functional disability. 80/80 represents no disability. Minimal detectable change is 9 points. Medical Necessity:  
· Mr. Karly Cabrera is expected to optimize his lower extremity strength and ROM in preparation for joint replacement surgery. Reason for Services/Other Comments: · Achieve baseline assesment of musculoskeletal system, functional mobility and home environment. , educate in PT HEP in preparation for surgery, educate in hospital plan of care. Use of outcome tool(s) and clinical judgement create a POC that gives a: Clear prediction of patient's progress: LOW COMPLEXITY  
TREATMENT:  
Treatment/Session Assessment:  Patient was instructed in PT- HEP to increase strength and ROM in LEs. Answered all questions. · Post session pain:  2 
· Compliance with Program/Exercises: compliant most of the time. Total Treatment Duration: PT Patient Time In/Time Out Time In: 0700 Time Out: 6290 Bethany Yuan PT  
    
 
 Stiffness due to immobility

## 2019-04-09 NOTE — PERIOP NOTES
WBC count abnormal and trace blood in UA. Lab results faxed to surgeon's office. All other lab results within anesthesia guidelines. Lab results sent to PCP per surgeon's request.     Recent Results (from the past 12 hour(s))   CBC WITH AUTOMATED DIFF    Collection Time: 04/09/19  7:45 AM   Result Value Ref Range    WBC 3.7 (L) 4.3 - 11.1 K/uL    RBC 4.63 4.23 - 5.6 M/uL    HGB 13.1 (L) 13.6 - 17.2 g/dL    HCT 40.2 (L) 41.1 - 50.3 %    MCV 86.8 79.6 - 97.8 FL    MCH 28.3 26.1 - 32.9 PG    MCHC 32.6 31.4 - 35.0 g/dL    RDW 14.4 11.9 - 14.6 %    PLATELET 253 237 - 079 K/uL    MPV 9.3 (L) 9.4 - 12.3 FL    ABSOLUTE NRBC 0.00 0.0 - 0.2 K/uL    DF AUTOMATED      NEUTROPHILS 28 (L) 43 - 78 %    LYMPHOCYTES 55 (H) 13 - 44 %    MONOCYTES 14 (H) 4.0 - 12.0 %    EOSINOPHILS 1 0.5 - 7.8 %    BASOPHILS 1 0.0 - 2.0 %    IMMATURE GRANULOCYTES 0 0.0 - 5.0 %    ABS. NEUTROPHILS 1.1 (L) 1.7 - 8.2 K/UL    ABS. LYMPHOCYTES 2.1 0.5 - 4.6 K/UL    ABS. MONOCYTES 0.5 0.1 - 1.3 K/UL    ABS. EOSINOPHILS 0.0 0.0 - 0.8 K/UL    ABS. BASOPHILS 0.0 0.0 - 0.2 K/UL    ABS. IMM.  GRANS. 0.0 0.0 - 0.5 K/UL   METABOLIC PANEL, BASIC    Collection Time: 04/09/19  7:45 AM   Result Value Ref Range    Sodium 140 136 - 145 mmol/L    Potassium 4.3 3.5 - 5.1 mmol/L    Chloride 107 98 - 107 mmol/L    CO2 25 21 - 32 mmol/L    Anion gap 8 mmol/L    Glucose 86 65 - 100 mg/dL    BUN 17 8 - 23 MG/DL    Creatinine 0.86 0.8 - 1.5 MG/DL    GFR est AA >60 >60 ml/min/1.73m2    GFR est non-AA >60 ml/min/1.73m2    Calcium 9.0 8.3 - 10.4 MG/DL   PROTHROMBIN TIME + INR    Collection Time: 04/09/19  7:45 AM   Result Value Ref Range    Prothrombin time 13.4 11.7 - 14.5 sec    INR 1.0     PTT    Collection Time: 04/09/19  7:45 AM   Result Value Ref Range    aPTT 30.0 24.7 - 39.8 SEC   URINALYSIS W/ RFLX MICROSCOPIC    Collection Time: 04/09/19  7:45 AM   Result Value Ref Range    Color YELLOW      Appearance CLEAR      Specific gravity 1.022 1.001 - 1.023      pH (UA) 6.0 5.0 - 9.0      Protein NEGATIVE  NEG mg/dL    Glucose NEGATIVE  mg/dL    Ketone NEGATIVE  NEG mg/dL    Bilirubin NEGATIVE  NEG      Blood TRACE (A) NEG      Urobilinogen 1.0 0.2 - 1.0 EU/dL    Nitrites NEGATIVE  NEG      Leukocyte Esterase NEGATIVE  NEG      WBC 0-3 0 /hpf    RBC 0-3 0 /hpf    Epithelial cells 0-3 0 /hpf    Bacteria 0 0 /hpf    Casts 0 0 /lpf   EKG, 12 LEAD, INITIAL    Collection Time: 04/09/19  8:49 AM   Result Value Ref Range    Ventricular Rate 58 BPM    Atrial Rate 58 BPM    P-R Interval 186 ms    QRS Duration 88 ms    Q-T Interval 436 ms    QTC Calculation (Bezet) 428 ms    Calculated P Axis 47 degrees    Calculated R Axis 37 degrees    Calculated T Axis 40 degrees    Diagnosis       Sinus bradycardia  Otherwise normal ECG  No previous ECGs available  Confirmed by ST JONATHON MAGANA MD (), NICK LEWIS ([id number]) on 4/9/2019 12:43:41 PM     MSSA/MRSA SC BY PCR, NASAL SWAB    Collection Time: 04/09/19  9:35 AM   Result Value Ref Range    Special Requests: NARES      Culture result:        SA target not detected. A MRSA NEGATIVE, SA NEGATIVE test result does not preclude MRSA or SA nasal colonization.

## 2019-04-09 NOTE — PERIOP NOTES
Patient verified name and . Order for consent was found in EHR and matches case posting; patient verified. left total knee arthroplasty    Type 3 surgery, PAT Joint assessment complete. Labs per surgeon: cbc, bmp, UA, PT, PTT, MRSA nasal swab; results pending. Labs per anesthesia protocol: no additional lab work needed. EKG:Done today- within anesthesia guidelines. MRSA/MSSA swab collected; pharmacy to review and dose antibiotic as appropriate. Hibiclens and instructions to return bottle on DOS given per hospital policy. Patient provided with handouts including Guide to Surgery, Pain Management, Hand Hygiene, Blood Transfusion Education, and [place] [facility] Brochure. Patient answered medical/surgical history questions at their best of ability. All prior to admission medications documented in [facility]. Original medication prescription bottle not visualized during patient appointment. Patient instructed to hold all vitamins 7 days prior to surgery and NSAIDS 5 days prior to surgery. Prescription medications to hold: vitamin B 12. Patient instructed to continue previous medications as prescribed prior to surgery and to take the following medications the day of surgery according to anesthesia guidelines with a small sip of water: flonase and zantac. Pt instructed to take Basaglar 16 units the night before surgery. Patient teach back successful and patient demonstrates knowledge of instruction.

## 2019-04-10 VITALS
BODY MASS INDEX: 28.11 KG/M2 | OXYGEN SATURATION: 98 % | SYSTOLIC BLOOD PRESSURE: 151 MMHG | HEART RATE: 61 BPM | RESPIRATION RATE: 16 BRPM | TEMPERATURE: 96.3 F | WEIGHT: 219 LBS | DIASTOLIC BLOOD PRESSURE: 85 MMHG | HEIGHT: 74 IN

## 2019-04-10 NOTE — PROGRESS NOTES
04/09/19 0730   Oxygen Therapy   O2 Sat (%) 99 %   Pulse via Oximetry 73 beats per minute   O2 Device Room air   Pre-Treatment   Breath Sounds Bilateral Diminished   Pre FEV1 (liters) 2.7 liters   % Predicted 76   Incentive Spirometry Treatment   Actual Volume (ml) 3500 ml     Initial respiratory Assessment completed with pt. Pt was interviewed and evaluated in Joint camp prior to surgery. Patient ID:  Anita Camarena  [id number]  31 y.o.  1950  Surgeon: Dr. Dallas Keller  Date of Surgery: 5/1/2019  Procedure: Total Left Knee Arthroplasty  Primary Care Physician: Layla Auguste -[phone]  Specialists:                                  Pt instructed in the use of Incentive Spirometry. Pt instructed to bring Incentive Spirometer back on date of surgery & to start using Is upon return to pt room.     Pt taught proper cough technique    History of smoking:   NONE                                                       Quit date:           Secondhand smoke:MOTHER      Past procedures with Oxygen desaturation:DENIES    Past Medical History:   Diagnosis Date    Arthritis     BPH (benign prostatic hyperplasia)     Diabetes ([facility] [address]     Type 2, oral meds and insulin, average BS-115, Hypoglycemic signs at 90- very rare per pt, HgbA1C- 8.1 in 4/2018     GERD (gastroesophageal reflux disease)     Managed with meds     Hepatitis C     Treated in 2017, Viral load at 0 per pt     HTN (hypertension)     managed with meds     Prostate cancer ([facility] [address]     Treated with HIFU     Sepsis ([facility] [address] 04/2018    From bladder infection                                                                                                                                                      Respiratory history:DENIES SOB                                                                  Respiratory meds:  DENIES                                       FAMILY PRESENT:                                                               NO PAST SLEEP STUDY:                       DENIES  HX OF LUCA:                                        DENIES                                     LUCA assessment:                                               SLEEPS ON SIDE                                                             PHYSICAL EXAM   Body mass index is 28.12 kg/m².    Visit Vitals  /85 (BP 1 Location: Right arm, BP Patient Position: At rest;Sitting)   Pulse 61   Temp 96.3 °F (35.7 °C)   Resp 16   Ht 6' 2\" (1.88 m)   Wt 99.3 kg (219 lb)   SpO2 98%   BMI 28.12 kg/m²     Neck circumference:  41.5    cm    Loud snoring:                                   DENIES    Witnessed apnea or wakening gasping or choking:,                                                                                                           APNEA    Awakens with headaches:                                                  DENIES    Morning or daytime tiredness/ sleepiness:                    DENIES NAPS DAILY ONCE DAILY 10-15 MIN    Dry mouth or sore throat in morning:                                                                                      DENIES    [place] stage:  2    SACS score:11    STOP/BANG:3                              CPAP:                       NONE                                                        Referrals:    Pt. Phone Number:
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
## 2019-06-05 ENCOUNTER — HOSPITAL ENCOUNTER (OUTPATIENT)
Dept: SURGERY | Age: 69
Discharge: HOME | End: 2019-06-05

## 2019-06-07 ENCOUNTER — HOSPITAL ENCOUNTER (OUTPATIENT)
Dept: SURGERY | Age: 69
Discharge: HOME | End: 2019-06-07
Attending: ORTHOPAEDIC SURGERY
Payer: MEDICARE

## 2019-06-07 VITALS
HEART RATE: 67 BPM | SYSTOLIC BLOOD PRESSURE: 141 MMHG | TEMPERATURE: 95.7 F | OXYGEN SATURATION: 99 % | DIASTOLIC BLOOD PRESSURE: 88 MMHG | BODY MASS INDEX: 27.98 KG/M2 | WEIGHT: 218 LBS | HEIGHT: 74 IN

## 2019-06-07 LAB
ANION GAP SERPL CALC-SCNC: 6 MMOL/L (ref 7–16)
APPEARANCE UR: CLEAR
APTT PPP: 29.5 SEC (ref 24.7–39.8)
BACTERIA SPEC CULT: NORMAL
BACTERIA URNS QL MICRO: 0 /HPF
BASOPHILS # BLD: 0 K/UL (ref 0–0.2)
BASOPHILS NFR BLD: 0 % (ref 0–2)
BILIRUB UR QL: NEGATIVE
BUN SERPL-MCNC: 18 MG/DL (ref 8–23)
CALCIUM SERPL-MCNC: 8.9 MG/DL (ref 8.3–10.4)
CASTS URNS QL MICRO: ABNORMAL /LPF
CHLORIDE SERPL-SCNC: 108 MMOL/L (ref 98–107)
CO2 SERPL-SCNC: 25 MMOL/L (ref 21–32)
COLOR UR: YELLOW
CREAT SERPL-MCNC: 1.17 MG/DL (ref 0.8–1.5)
DIFFERENTIAL METHOD BLD: ABNORMAL
EOSINOPHIL # BLD: 0 K/UL (ref 0–0.8)
EOSINOPHIL NFR BLD: 1 % (ref 0.5–7.8)
EPI CELLS #/AREA URNS HPF: ABNORMAL /HPF
ERYTHROCYTE [DISTWIDTH] IN BLOOD BY AUTOMATED COUNT: 14.4 % (ref 11.9–14.6)
GLUCOSE SERPL-MCNC: 129 MG/DL (ref 65–100)
GLUCOSE UR STRIP.AUTO-MCNC: NEGATIVE MG/DL
HCT VFR BLD AUTO: 38.1 % (ref 41.1–50.3)
HGB BLD-MCNC: 12.4 G/DL (ref 13.6–17.2)
HGB UR QL STRIP: ABNORMAL
IMM GRANULOCYTES # BLD AUTO: 0 K/UL (ref 0–0.5)
IMM GRANULOCYTES NFR BLD AUTO: 0 % (ref 0–5)
INR PPP: 1
KETONES UR QL STRIP.AUTO: NEGATIVE MG/DL
LEUKOCYTE ESTERASE UR QL STRIP.AUTO: NEGATIVE
LYMPHOCYTES # BLD: 2.1 K/UL (ref 0.5–4.6)
LYMPHOCYTES NFR BLD: 45 % (ref 13–44)
MCH RBC QN AUTO: 28.2 PG (ref 26.1–32.9)
MCHC RBC AUTO-ENTMCNC: 32.5 G/DL (ref 31.4–35)
MCV RBC AUTO: 86.8 FL (ref 79.6–97.8)
MONOCYTES # BLD: 0.5 K/UL (ref 0.1–1.3)
MONOCYTES NFR BLD: 11 % (ref 4–12)
NEUTS SEG # BLD: 2 K/UL (ref 1.7–8.2)
NEUTS SEG NFR BLD: 42 % (ref 43–78)
NITRITE UR QL STRIP.AUTO: NEGATIVE
NRBC # BLD: 0 K/UL (ref 0–0.2)
PH UR STRIP: 5.5 [PH] (ref 5–9)
PLATELET # BLD AUTO: 187 K/UL (ref 150–450)
PMV BLD AUTO: 9.1 FL (ref 9.4–12.3)
POTASSIUM SERPL-SCNC: 4.3 MMOL/L (ref 3.5–5.1)
PROT UR STRIP-MCNC: NEGATIVE MG/DL
PROTHROMBIN TIME: 13.6 SEC (ref 11.7–14.5)
RBC # BLD AUTO: 4.39 M/UL (ref 4.23–5.6)
RBC #/AREA URNS HPF: ABNORMAL /HPF
SERVICE CMNT-IMP: NORMAL
SODIUM SERPL-SCNC: 139 MMOL/L (ref 136–145)
SP GR UR REFRACTOMETRY: 1.03 (ref 1–1.02)
UROBILINOGEN UR QL STRIP.AUTO: 1 EU/DL (ref 0.2–1)
WBC # BLD AUTO: 4.7 K/UL (ref 4.3–11.1)
WBC URNS QL MICRO: ABNORMAL /HPF

## 2019-06-07 PROCEDURE — 81001 URINALYSIS AUTO W/SCOPE: CPT

## 2019-06-07 PROCEDURE — 85730 THROMBOPLASTIN TIME PARTIAL: CPT

## 2019-06-07 PROCEDURE — 80048 BASIC METABOLIC PNL TOTAL CA: CPT

## 2019-06-07 PROCEDURE — 87641 MR-STAPH DNA AMP PROBE: CPT

## 2019-06-07 PROCEDURE — 85025 COMPLETE CBC W/AUTO DIFF WBC: CPT

## 2019-06-07 PROCEDURE — 85610 PROTHROMBIN TIME: CPT

## 2019-06-07 RX ORDER — METFORMIN HYDROCHLORIDE 500 MG/1
1000 TABLET, FILM COATED, EXTENDED RELEASE ORAL
COMMUNITY

## 2019-06-07 NOTE — PERIOP NOTES
Patient verified name and . Order for consent found in EHR and matches case posting; patient verified. Type 3 surgery, walk in assessment complete. Labs per surgeon: CBC, BMP, U/A, PT/PTT ; results pending. Labs per anesthesia protocol: included in surgeons orders. EKG: last done at prior joint camp appointment. Result within anesthesia protocols and on chart for reference. Patient has a callused area on the bottom of his left foot. He said he went to the doctor the other day to have it evaluated and they told him to put a neosporin band-aid on it and then it fell off. This RN looked at the patient's foot today. Callused area is not open or draining. Called and spoke with Tae Hess at Dr. Dallas Keller office to inform them since it is on the operative side. Tae Hess said she will put a note on the patient's chart for the surgeon or the PA to evaluate at his pre-op appointment next week. Patient states he has been using his Incentive Spirometer at home 10 times twice a day and will bring with him dos. MRSA/MSSA swab collected; pharmacy to review and dose antibiotic as appropriate. Hibiclens and instructions to return bottle on DOS given per hospital policy. Patient provided with handouts including Guide to Surgery, Pain Management, Hand Hygiene, Blood Transfusion Education, and [facility] Brochure. Patient answered medical/surgical history questions at their best of ability. All prior to admission medications documented in [facility]. Original medication prescription bottles NOT visualized during patient appointment. Patient instructed to hold all vitamins 7 days prior to surgery and NSAIDS 5 days prior to surgery. Prescription medications to hold: none. Instructed patient to take 16 units of Lantus the night before on 19.      Patient instructed to continue previous medications as prescribed prior to surgery and to take the following medications the day of surgery according to anesthesia guidelines with a small sip of water: flonase if needed, zantac. Instructed patient to bring dos: zantac, metformin er. Patient teach back successful and patient demonstrates knowledge of instruction.

## 2019-06-07 NOTE — PERIOP NOTES
Roxanne Koehler MD    Your patient recently had labs drawn during a hospital appointment due to an upcoming surgery. The results are attached. If you have any questions or concerns please reach out to your patient for a follow-up in your office. Please do not respond to this message as my mailbox is not monitored. You may call [phone] with questions or concerns. Recent Results (from the past 12 hour(s))   CBC WITH AUTOMATED DIFF    Collection Time: 06/07/19  3:16 PM   Result Value Ref Range    WBC 4.7 4.3 - 11.1 K/uL    RBC 4.39 4.23 - 5.6 M/uL    HGB 12.4 (L) 13.6 - 17.2 g/dL    HCT 38.1 (L) 41.1 - 50.3 %    MCV 86.8 79.6 - 97.8 FL    MCH 28.2 26.1 - 32.9 PG    MCHC 32.5 31.4 - 35.0 g/dL    RDW 14.4 11.9 - 14.6 %    PLATELET 428 573 - 112 K/uL    MPV 9.1 (L) 9.4 - 12.3 FL    ABSOLUTE NRBC 0.00 0.0 - 0.2 K/uL    DF AUTOMATED      NEUTROPHILS 42 (L) 43 - 78 %    LYMPHOCYTES 45 (H) 13 - 44 %    MONOCYTES 11 4.0 - 12.0 %    EOSINOPHILS 1 0.5 - 7.8 %    BASOPHILS 0 0.0 - 2.0 %    IMMATURE GRANULOCYTES 0 0.0 - 5.0 %    ABS. NEUTROPHILS 2.0 1.7 - 8.2 K/UL    ABS. LYMPHOCYTES 2.1 0.5 - 4.6 K/UL    ABS. MONOCYTES 0.5 0.1 - 1.3 K/UL    ABS. EOSINOPHILS 0.0 0.0 - 0.8 K/UL    ABS. BASOPHILS 0.0 0.0 - 0.2 K/UL    ABS. IMM.  GRANS. 0.0 0.0 - 0.5 K/UL   METABOLIC PANEL, BASIC    Collection Time: 06/07/19  3:16 PM   Result Value Ref Range    Sodium 139 136 - 145 mmol/L    Potassium 4.3 3.5 - 5.1 mmol/L    Chloride 108 (H) 98 - 107 mmol/L    CO2 25 21 - 32 mmol/L    Anion gap 6 (L) 7 - 16 mmol/L    Glucose 129 (H) 65 - 100 mg/dL    BUN 18 8 - 23 MG/DL    Creatinine 1.17 0.8 - 1.5 MG/DL    GFR est AA >60 >60 ml/min/1.73m2    GFR est non-AA >60 >60 ml/min/1.73m2    Calcium 8.9 8.3 - 10.4 MG/DL   PROTHROMBIN TIME + INR    Collection Time: 06/07/19  3:16 PM   Result Value Ref Range    Prothrombin time 13.6 11.7 - 14.5 sec    INR 1.0     PTT    Collection Time: 06/07/19  3:16 PM   Result Value Ref Range    aPTT 29.5 24.7 - 39.8 SEC   URINALYSIS W/ RFLX MICROSCOPIC    Collection Time: 06/07/19  3:16 PM   Result Value Ref Range    Color YELLOW      Appearance CLEAR      Specific gravity 1.026 (H) 1.001 - 1.023      pH (UA) 5.5 5.0 - 9.0      Protein NEGATIVE  NEG mg/dL    Glucose NEGATIVE  mg/dL    Ketone NEGATIVE  NEG mg/dL    Bilirubin NEGATIVE  NEG      Blood TRACE (A) NEG      Urobilinogen 1.0 0.2 - 1.0 EU/dL    Nitrites NEGATIVE  NEG      Leukocyte Esterase NEGATIVE  NEG      WBC 0-3 0 /hpf    RBC 0-3 0 /hpf    Epithelial cells 0-3 0 /hpf    Bacteria 0 0 /hpf    Casts 0-3 0 /lpf

## 2019-06-11 ENCOUNTER — ANESTHESIA EVENT (OUTPATIENT)
Dept: SURGERY | Age: 69
DRG: 470 | End: 2019-06-11
Payer: MEDICARE
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
## 2019-06-12 ENCOUNTER — HOSPITAL ENCOUNTER (INPATIENT)
Age: 69
LOS: 5 days | Discharge: NURSING, REHAB OR LONG-TERM CARE | DRG: 470 | End: 2019-06-17
Attending: ORTHOPAEDIC SURGERY | Admitting: ORTHOPAEDIC SURGERY
Payer: MEDICARE

## 2019-06-12 ENCOUNTER — ANESTHESIA (OUTPATIENT)
Dept: SURGERY | Age: 69
DRG: 470 | End: 2019-06-12
Payer: MEDICARE

## 2019-06-12 DIAGNOSIS — Z96.652 STATUS POST TOTAL KNEE REPLACEMENT, LEFT: Primary | ICD-10-CM

## 2019-06-12 PROBLEM — M19.90 OSTEOARTHRITIS: Status: ACTIVE | Noted: 2019-06-12

## 2019-06-12 LAB
ABO + RH BLD: NORMAL
BLOOD GROUP ANTIBODIES SERPL: NORMAL
GLUCOSE BLD STRIP.AUTO-MCNC: 136 MG/DL (ref 65–100)
HGB BLD-MCNC: 11.1 G/DL (ref 13.6–17.2)
SPECIMEN EXP DATE BLD: NORMAL

## 2019-06-12 PROCEDURE — 77030031139 HC SUT VCRL2 J&J -A: Performed by: ORTHOPAEDIC SURGERY

## 2019-06-12 PROCEDURE — 74011250636 HC RX REV CODE- 250/636

## 2019-06-12 PROCEDURE — 77030019557 HC ELECTRD VES SEAL MEDT -F: Performed by: ORTHOPAEDIC SURGERY

## 2019-06-12 PROCEDURE — 77030018836 HC SOL IRR NACL ICUM -A: Performed by: ORTHOPAEDIC SURGERY

## 2019-06-12 PROCEDURE — 77030013708 HC HNDPC SUC IRR PULS STRY –B: Performed by: ORTHOPAEDIC SURGERY

## 2019-06-12 PROCEDURE — 77030003665 HC NDL SPN BBMI -A: Performed by: ANESTHESIOLOGY

## 2019-06-12 PROCEDURE — 77030003602 HC NDL NRV BLK BBMI -B: Performed by: ANESTHESIOLOGY

## 2019-06-12 PROCEDURE — 77030020782 HC GWN BAIR PAWS FLX 3M -B: Performed by: ANESTHESIOLOGY

## 2019-06-12 PROCEDURE — 76210000016 HC OR PH I REC 1 TO 1.5 HR: Performed by: ORTHOPAEDIC SURGERY

## 2019-06-12 PROCEDURE — 77030012935 HC DRSG AQUACEL BMS -B: Performed by: ORTHOPAEDIC SURGERY

## 2019-06-12 PROCEDURE — 74011250636 HC RX REV CODE- 250/636: Performed by: ANESTHESIOLOGY

## 2019-06-12 PROCEDURE — 94760 N-INVAS EAR/PLS OXIMETRY 1: CPT

## 2019-06-12 PROCEDURE — 86580 TB INTRADERMAL TEST: CPT | Performed by: ORTHOPAEDIC SURGERY

## 2019-06-12 PROCEDURE — 74011250637 HC RX REV CODE- 250/637: Performed by: ORTHOPAEDIC SURGERY

## 2019-06-12 PROCEDURE — 77010033678 HC OXYGEN DAILY

## 2019-06-12 PROCEDURE — 77030037364 HC TIB INST CR  DISP STRY -C: Performed by: ORTHOPAEDIC SURGERY

## 2019-06-12 PROCEDURE — 97535 SELF CARE MNGMENT TRAINING: CPT

## 2019-06-12 PROCEDURE — 86900 BLOOD TYPING SEROLOGIC ABO: CPT

## 2019-06-12 PROCEDURE — 74011000250 HC RX REV CODE- 250

## 2019-06-12 PROCEDURE — 77030035643 HC BLD SAW OSC PRECIS STRY -C: Performed by: ORTHOPAEDIC SURGERY

## 2019-06-12 PROCEDURE — 74011250637 HC RX REV CODE- 250/637: Performed by: PHYSICIAN ASSISTANT

## 2019-06-12 PROCEDURE — 77030002966 HC SUT PDS J&J -A: Performed by: ORTHOPAEDIC SURGERY

## 2019-06-12 PROCEDURE — 74011636637 HC RX REV CODE- 636/637: Performed by: PHYSICIAN ASSISTANT

## 2019-06-12 PROCEDURE — 76060000035 HC ANESTHESIA 2 TO 2.5 HR: Performed by: ORTHOPAEDIC SURGERY

## 2019-06-12 PROCEDURE — 77030037714 HC CLOSR DEV INCIS ZIP STRY -C: Performed by: ORTHOPAEDIC SURGERY

## 2019-06-12 PROCEDURE — 87075 CULTR BACTERIA EXCEPT BLOOD: CPT

## 2019-06-12 PROCEDURE — 97165 OT EVAL LOW COMPLEX 30 MIN: CPT

## 2019-06-12 PROCEDURE — C1776 JOINT DEVICE (IMPLANTABLE): HCPCS | Performed by: ORTHOPAEDIC SURGERY

## 2019-06-12 PROCEDURE — 74011250636 HC RX REV CODE- 250/636: Performed by: PHYSICIAN ASSISTANT

## 2019-06-12 PROCEDURE — 77030006720 HC BLD PAT RMR ZIMM -B: Performed by: ORTHOPAEDIC SURGERY

## 2019-06-12 PROCEDURE — 77030002912 HC SUT ETHBND J&J -A: Performed by: ORTHOPAEDIC SURGERY

## 2019-06-12 PROCEDURE — 74011250636 HC RX REV CODE- 250/636: Performed by: ORTHOPAEDIC SURGERY

## 2019-06-12 PROCEDURE — 65270000029 HC RM PRIVATE

## 2019-06-12 PROCEDURE — 74011000302 HC RX REV CODE- 302: Performed by: ORTHOPAEDIC SURGERY

## 2019-06-12 PROCEDURE — 85018 HEMOGLOBIN: CPT

## 2019-06-12 PROCEDURE — 77030037715 HC DRSG ZIP STRY -B: Performed by: ORTHOPAEDIC SURGERY

## 2019-06-12 PROCEDURE — 77030007880 HC KT SPN EPDRL BBMI -B: Performed by: ANESTHESIOLOGY

## 2019-06-12 PROCEDURE — 97161 PT EVAL LOW COMPLEX 20 MIN: CPT

## 2019-06-12 PROCEDURE — 76942 ECHO GUIDE FOR BIOPSY: CPT | Performed by: ORTHOPAEDIC SURGERY

## 2019-06-12 PROCEDURE — 74011000250 HC RX REV CODE- 250: Performed by: ORTHOPAEDIC SURGERY

## 2019-06-12 PROCEDURE — 36415 COLL VENOUS BLD VENIPUNCTURE: CPT

## 2019-06-12 PROCEDURE — 82962 GLUCOSE BLOOD TEST: CPT

## 2019-06-12 PROCEDURE — 77030008467 HC STPLR SKN COVD -B: Performed by: ORTHOPAEDIC SURGERY

## 2019-06-12 PROCEDURE — 77030035236 HC SUT PDS STRATFX BARB J&J -B: Performed by: ORTHOPAEDIC SURGERY

## 2019-06-12 PROCEDURE — 97110 THERAPEUTIC EXERCISES: CPT

## 2019-06-12 PROCEDURE — 99221 1ST HOSP IP/OBS SF/LOW 40: CPT | Performed by: PHYSICAL MEDICINE & REHABILITATION

## 2019-06-12 PROCEDURE — 74011000258 HC RX REV CODE- 258: Performed by: ORTHOPAEDIC SURGERY

## 2019-06-12 PROCEDURE — 77030034849: Performed by: ORTHOPAEDIC SURGERY

## 2019-06-12 PROCEDURE — 87205 SMEAR GRAM STAIN: CPT

## 2019-06-12 PROCEDURE — 0SRD0JA REPLACEMENT OF LEFT KNEE JOINT WITH SYNTHETIC SUBSTITUTE, UNCEMENTED, OPEN APPROACH: ICD-10-PCS | Performed by: ORTHOPAEDIC SURGERY

## 2019-06-12 PROCEDURE — 76010010054 HC POST OP PAIN BLOCK: Performed by: ORTHOPAEDIC SURGERY

## 2019-06-12 PROCEDURE — 77030037363 HC FEM INST CR  DISP STRY -C: Performed by: ORTHOPAEDIC SURGERY

## 2019-06-12 PROCEDURE — 76010000171 HC OR TIME 2 TO 2.5 HR INTENSV-TIER 1: Performed by: ORTHOPAEDIC SURGERY

## 2019-06-12 DEVICE — COMPONENT PAT DIA35MM THK10MM SUPERIOR/INFERIOR KNEE: Type: IMPLANTABLE DEVICE | Site: KNEE | Status: FUNCTIONAL

## 2019-06-12 DEVICE — BASEPLATE TIB SZ 6 AP52MM ML77MM KNEE TRITANIUM 4 CRUCFRM: Type: IMPLANTABLE DEVICE | Site: KNEE | Status: FUNCTIONAL

## 2019-06-12 DEVICE — IMPLANTABLE DEVICE: Type: IMPLANTABLE DEVICE | Site: KNEE | Status: FUNCTIONAL

## 2019-06-12 RX ORDER — MIDAZOLAM HYDROCHLORIDE 1 MG/ML
INJECTION, SOLUTION INTRAMUSCULAR; INTRAVENOUS AS NEEDED
Status: DISCONTINUED | OUTPATIENT
Start: 2019-06-12 | End: 2019-06-12 | Stop reason: HOSPADM

## 2019-06-12 RX ORDER — ASPIRIN 81 MG/1
81 TABLET ORAL EVERY 12 HOURS
Status: DISCONTINUED | OUTPATIENT
Start: 2019-06-12 | End: 2019-06-17 | Stop reason: HOSPADM

## 2019-06-12 RX ORDER — ACETAMINOPHEN 10 MG/ML
1000 INJECTION, SOLUTION INTRAVENOUS ONCE
Status: ACTIVE | OUTPATIENT
Start: 2019-06-12 | End: 2019-06-13

## 2019-06-12 RX ORDER — NEOMYCIN AND POLYMYXIN B SULFATES 40; 200000 MG/ML; [USP'U]/ML
SOLUTION IRRIGATION AS NEEDED
Status: DISCONTINUED | OUTPATIENT
Start: 2019-06-12 | End: 2019-06-12 | Stop reason: HOSPADM

## 2019-06-12 RX ORDER — DEXAMETHASONE SODIUM PHOSPHATE 4 MG/ML
INJECTION, SOLUTION INTRA-ARTICULAR; INTRALESIONAL; INTRAMUSCULAR; INTRAVENOUS; SOFT TISSUE AS NEEDED
Status: DISCONTINUED | OUTPATIENT
Start: 2019-06-12 | End: 2019-06-12 | Stop reason: HOSPADM

## 2019-06-12 RX ORDER — DIPHENHYDRAMINE HCL 25 MG
25 CAPSULE ORAL
Status: DISCONTINUED | OUTPATIENT
Start: 2019-06-12 | End: 2019-06-17 | Stop reason: HOSPADM

## 2019-06-12 RX ORDER — SODIUM CHLORIDE 9 MG/ML
100 INJECTION, SOLUTION INTRAVENOUS CONTINUOUS
Status: DISPENSED | OUTPATIENT
Start: 2019-06-12 | End: 2019-06-14

## 2019-06-12 RX ORDER — HYDROMORPHONE HYDROCHLORIDE 2 MG/1
2 TABLET ORAL
Status: DISCONTINUED | OUTPATIENT
Start: 2019-06-12 | End: 2019-06-17 | Stop reason: HOSPADM

## 2019-06-12 RX ORDER — MIDAZOLAM HYDROCHLORIDE 1 MG/ML
2 INJECTION, SOLUTION INTRAMUSCULAR; INTRAVENOUS
Status: DISCONTINUED | OUTPATIENT
Start: 2019-06-12 | End: 2019-06-12 | Stop reason: HOSPADM

## 2019-06-12 RX ORDER — FENTANYL CITRATE 50 UG/ML
INJECTION, SOLUTION INTRAMUSCULAR; INTRAVENOUS AS NEEDED
Status: DISCONTINUED | OUTPATIENT
Start: 2019-06-12 | End: 2019-06-12 | Stop reason: HOSPADM

## 2019-06-12 RX ORDER — LANOLIN ALCOHOL/MO/W.PET/CERES
1000 CREAM (GRAM) TOPICAL DAILY
Status: DISCONTINUED | OUTPATIENT
Start: 2019-06-13 | End: 2019-06-17 | Stop reason: HOSPADM

## 2019-06-12 RX ORDER — ASPIRIN 81 MG/1
81 TABLET ORAL EVERY 12 HOURS
Qty: 60 TAB | Refills: 1 | Status: SHIPPED | OUTPATIENT
Start: 2019-06-12 | End: 2019-07-17

## 2019-06-12 RX ORDER — LIDOCAINE HYDROCHLORIDE 10 MG/ML
0.1 INJECTION INFILTRATION; PERINEURAL AS NEEDED
Status: DISCONTINUED | OUTPATIENT
Start: 2019-06-12 | End: 2019-06-12 | Stop reason: HOSPADM

## 2019-06-12 RX ORDER — SODIUM CHLORIDE, SODIUM LACTATE, POTASSIUM CHLORIDE, CALCIUM CHLORIDE 600; 310; 30; 20 MG/100ML; MG/100ML; MG/100ML; MG/100ML
100 INJECTION, SOLUTION INTRAVENOUS CONTINUOUS
Status: DISCONTINUED | OUTPATIENT
Start: 2019-06-12 | End: 2019-06-12 | Stop reason: HOSPADM

## 2019-06-12 RX ORDER — PROPOFOL 10 MG/ML
INJECTION, EMULSION INTRAVENOUS
Status: DISCONTINUED | OUTPATIENT
Start: 2019-06-12 | End: 2019-06-12 | Stop reason: HOSPADM

## 2019-06-12 RX ORDER — NALOXONE HYDROCHLORIDE 0.4 MG/ML
0.1 INJECTION, SOLUTION INTRAMUSCULAR; INTRAVENOUS; SUBCUTANEOUS AS NEEDED
Status: DISCONTINUED | OUTPATIENT
Start: 2019-06-12 | End: 2019-06-12 | Stop reason: HOSPADM

## 2019-06-12 RX ORDER — DEXAMETHASONE SODIUM PHOSPHATE 4 MG/ML
INJECTION, SOLUTION INTRA-ARTICULAR; INTRALESIONAL; INTRAMUSCULAR; INTRAVENOUS; SOFT TISSUE
Status: COMPLETED | OUTPATIENT
Start: 2019-06-12 | End: 2019-06-12

## 2019-06-12 RX ORDER — DIPHENHYDRAMINE HYDROCHLORIDE 50 MG/ML
12.5 INJECTION, SOLUTION INTRAMUSCULAR; INTRAVENOUS
Status: DISCONTINUED | OUTPATIENT
Start: 2019-06-12 | End: 2019-06-12 | Stop reason: HOSPADM

## 2019-06-12 RX ORDER — HYDROMORPHONE HYDROCHLORIDE 2 MG/ML
0.5 INJECTION, SOLUTION INTRAMUSCULAR; INTRAVENOUS; SUBCUTANEOUS
Status: DISCONTINUED | OUTPATIENT
Start: 2019-06-12 | End: 2019-06-12 | Stop reason: HOSPADM

## 2019-06-12 RX ORDER — METFORMIN HYDROCHLORIDE 500 MG/1
500 TABLET ORAL 2 TIMES DAILY WITH MEALS
Status: DISCONTINUED | OUTPATIENT
Start: 2019-06-13 | End: 2019-06-17 | Stop reason: HOSPADM

## 2019-06-12 RX ORDER — FLUTICASONE PROPIONATE 50 MCG
2 SPRAY, SUSPENSION (ML) NASAL
Status: DISCONTINUED | OUTPATIENT
Start: 2019-06-12 | End: 2019-06-12 | Stop reason: SDUPTHER

## 2019-06-12 RX ORDER — OXYCODONE HYDROCHLORIDE 5 MG/1
10 TABLET ORAL
Status: DISCONTINUED | OUTPATIENT
Start: 2019-06-12 | End: 2019-06-12 | Stop reason: HOSPADM

## 2019-06-12 RX ORDER — MIDAZOLAM HYDROCHLORIDE 1 MG/ML
2 INJECTION, SOLUTION INTRAMUSCULAR; INTRAVENOUS ONCE
Status: COMPLETED | OUTPATIENT
Start: 2019-06-12 | End: 2019-06-12

## 2019-06-12 RX ORDER — ROPIVACAINE HYDROCHLORIDE 2 MG/ML
INJECTION, SOLUTION EPIDURAL; INFILTRATION; PERINEURAL AS NEEDED
Status: DISCONTINUED | OUTPATIENT
Start: 2019-06-12 | End: 2019-06-12 | Stop reason: HOSPADM

## 2019-06-12 RX ORDER — TRANEXAMIC ACID 100 MG/ML
INJECTION, SOLUTION INTRAVENOUS AS NEEDED
Status: DISCONTINUED | OUTPATIENT
Start: 2019-06-12 | End: 2019-06-12 | Stop reason: HOSPADM

## 2019-06-12 RX ORDER — AMOXICILLIN 250 MG
2 CAPSULE ORAL DAILY
Status: DISCONTINUED | OUTPATIENT
Start: 2019-06-13 | End: 2019-06-17 | Stop reason: HOSPADM

## 2019-06-12 RX ORDER — OXYCODONE HYDROCHLORIDE 5 MG/1
5 TABLET ORAL
Status: DISCONTINUED | OUTPATIENT
Start: 2019-06-12 | End: 2019-06-12 | Stop reason: HOSPADM

## 2019-06-12 RX ORDER — KETOROLAC TROMETHAMINE 30 MG/ML
INJECTION, SOLUTION INTRAMUSCULAR; INTRAVENOUS AS NEEDED
Status: DISCONTINUED | OUTPATIENT
Start: 2019-06-12 | End: 2019-06-12 | Stop reason: HOSPADM

## 2019-06-12 RX ORDER — CEFAZOLIN SODIUM/WATER 2 G/20 ML
2 SYRINGE (ML) INTRAVENOUS ONCE
Status: COMPLETED | OUTPATIENT
Start: 2019-06-12 | End: 2019-06-12

## 2019-06-12 RX ORDER — ONDANSETRON 2 MG/ML
INJECTION INTRAMUSCULAR; INTRAVENOUS AS NEEDED
Status: DISCONTINUED | OUTPATIENT
Start: 2019-06-12 | End: 2019-06-12 | Stop reason: HOSPADM

## 2019-06-12 RX ORDER — DEXAMETHASONE SODIUM PHOSPHATE 100 MG/10ML
10 INJECTION INTRAMUSCULAR; INTRAVENOUS ONCE
Status: ACTIVE | OUTPATIENT
Start: 2019-06-13 | End: 2019-06-14

## 2019-06-12 RX ORDER — SODIUM CHLORIDE 0.9 % (FLUSH) 0.9 %
5-40 SYRINGE (ML) INJECTION EVERY 8 HOURS
Status: DISCONTINUED | OUTPATIENT
Start: 2019-06-12 | End: 2019-06-17 | Stop reason: HOSPADM

## 2019-06-12 RX ORDER — ONDANSETRON 2 MG/ML
4 INJECTION INTRAMUSCULAR; INTRAVENOUS
Status: DISCONTINUED | OUTPATIENT
Start: 2019-06-12 | End: 2019-06-17 | Stop reason: HOSPADM

## 2019-06-12 RX ORDER — CELECOXIB 200 MG/1
200 CAPSULE ORAL EVERY 12 HOURS
Status: DISCONTINUED | OUTPATIENT
Start: 2019-06-12 | End: 2019-06-17 | Stop reason: HOSPADM

## 2019-06-12 RX ORDER — FAMOTIDINE 20 MG/1
10 TABLET, FILM COATED ORAL 2 TIMES DAILY
Status: DISCONTINUED | OUTPATIENT
Start: 2019-06-12 | End: 2019-06-17 | Stop reason: HOSPADM

## 2019-06-12 RX ORDER — FENTANYL CITRATE 50 UG/ML
100 INJECTION, SOLUTION INTRAMUSCULAR; INTRAVENOUS ONCE
Status: DISCONTINUED | OUTPATIENT
Start: 2019-06-12 | End: 2019-06-12 | Stop reason: HOSPADM

## 2019-06-12 RX ORDER — TAMSULOSIN HYDROCHLORIDE 0.4 MG/1
0.4 CAPSULE ORAL
Status: DISCONTINUED | OUTPATIENT
Start: 2019-06-12 | End: 2019-06-17 | Stop reason: HOSPADM

## 2019-06-12 RX ORDER — LOSARTAN POTASSIUM 50 MG/1
50 TABLET ORAL
Status: DISCONTINUED | OUTPATIENT
Start: 2019-06-12 | End: 2019-06-17 | Stop reason: HOSPADM

## 2019-06-12 RX ORDER — NALOXONE HYDROCHLORIDE 0.4 MG/ML
.2-.4 INJECTION, SOLUTION INTRAMUSCULAR; INTRAVENOUS; SUBCUTANEOUS
Status: DISCONTINUED | OUTPATIENT
Start: 2019-06-12 | End: 2019-06-17 | Stop reason: HOSPADM

## 2019-06-12 RX ORDER — ACETAMINOPHEN 500 MG
1000 TABLET ORAL EVERY 6 HOURS
Status: DISCONTINUED | OUTPATIENT
Start: 2019-06-12 | End: 2019-06-17 | Stop reason: HOSPADM

## 2019-06-12 RX ORDER — ACETAMINOPHEN 10 MG/ML
1000 INJECTION, SOLUTION INTRAVENOUS ONCE
Status: COMPLETED | OUTPATIENT
Start: 2019-06-12 | End: 2019-06-12

## 2019-06-12 RX ORDER — ONDANSETRON 2 MG/ML
4 INJECTION INTRAMUSCULAR; INTRAVENOUS ONCE
Status: DISCONTINUED | OUTPATIENT
Start: 2019-06-12 | End: 2019-06-12 | Stop reason: HOSPADM

## 2019-06-12 RX ORDER — HYDROMORPHONE HYDROCHLORIDE 2 MG/1
2 TABLET ORAL
Qty: 40 TAB | Refills: 0 | Status: SHIPPED | OUTPATIENT
Start: 2019-06-12 | End: 2019-07-12

## 2019-06-12 RX ORDER — CEFAZOLIN SODIUM/WATER 2 G/20 ML
2 SYRINGE (ML) INTRAVENOUS EVERY 8 HOURS
Status: COMPLETED | OUTPATIENT
Start: 2019-06-12 | End: 2019-06-13

## 2019-06-12 RX ORDER — ALBUTEROL SULFATE 0.83 MG/ML
2.5 SOLUTION RESPIRATORY (INHALATION) AS NEEDED
Status: DISCONTINUED | OUTPATIENT
Start: 2019-06-12 | End: 2019-06-12 | Stop reason: HOSPADM

## 2019-06-12 RX ORDER — INSULIN GLARGINE 100 [IU]/ML
20 INJECTION, SOLUTION SUBCUTANEOUS
Status: DISCONTINUED | OUTPATIENT
Start: 2019-06-12 | End: 2019-06-17 | Stop reason: HOSPADM

## 2019-06-12 RX ORDER — ROPIVACAINE HYDROCHLORIDE 2 MG/ML
INJECTION, SOLUTION EPIDURAL; INFILTRATION; PERINEURAL
Status: COMPLETED | OUTPATIENT
Start: 2019-06-12 | End: 2019-06-12

## 2019-06-12 RX ORDER — HYDROMORPHONE HYDROCHLORIDE 2 MG/ML
1 INJECTION, SOLUTION INTRAMUSCULAR; INTRAVENOUS; SUBCUTANEOUS
Status: DISCONTINUED | OUTPATIENT
Start: 2019-06-12 | End: 2019-06-17 | Stop reason: HOSPADM

## 2019-06-12 RX ORDER — SODIUM CHLORIDE 0.9 % (FLUSH) 0.9 %
5-40 SYRINGE (ML) INJECTION AS NEEDED
Status: DISCONTINUED | OUTPATIENT
Start: 2019-06-12 | End: 2019-06-17 | Stop reason: HOSPADM

## 2019-06-12 RX ORDER — FLUTICASONE PROPIONATE 50 MCG
2 SPRAY, SUSPENSION (ML) NASAL
Status: DISCONTINUED | OUTPATIENT
Start: 2019-06-12 | End: 2019-06-17 | Stop reason: HOSPADM

## 2019-06-12 RX ADMIN — HYDROMORPHONE HYDROCHLORIDE 0.5 MG: 2 INJECTION INTRAMUSCULAR; INTRAVENOUS; SUBCUTANEOUS at 13:31

## 2019-06-12 RX ADMIN — HYDROMORPHONE HYDROCHLORIDE 0.5 MG: 2 INJECTION INTRAMUSCULAR; INTRAVENOUS; SUBCUTANEOUS at 13:21

## 2019-06-12 RX ADMIN — LOSARTAN POTASSIUM 50 MG: 50 TABLET ORAL at 22:00

## 2019-06-12 RX ADMIN — ACETAMINOPHEN 1000 MG: 500 TABLET, FILM COATED ORAL at 19:26

## 2019-06-12 RX ADMIN — Medication 3 AMPULE: at 08:59

## 2019-06-12 RX ADMIN — SODIUM CHLORIDE, SODIUM LACTATE, POTASSIUM CHLORIDE, AND CALCIUM CHLORIDE 100 ML/HR: 600; 310; 30; 20 INJECTION, SOLUTION INTRAVENOUS at 09:00

## 2019-06-12 RX ADMIN — FENTANYL CITRATE 25 MCG: 50 INJECTION, SOLUTION INTRAMUSCULAR; INTRAVENOUS at 11:18

## 2019-06-12 RX ADMIN — LIDOCAINE HYDROCHLORIDE 0.1 ML: 10 INJECTION, SOLUTION INFILTRATION; PERINEURAL at 08:59

## 2019-06-12 RX ADMIN — FENTANYL CITRATE 25 MCG: 50 INJECTION, SOLUTION INTRAMUSCULAR; INTRAVENOUS at 12:18

## 2019-06-12 RX ADMIN — SODIUM CHLORIDE, SODIUM LACTATE, POTASSIUM CHLORIDE, AND CALCIUM CHLORIDE: 600; 310; 30; 20 INJECTION, SOLUTION INTRAVENOUS at 10:53

## 2019-06-12 RX ADMIN — HYDROMORPHONE HYDROCHLORIDE 0.5 MG: 2 INJECTION INTRAMUSCULAR; INTRAVENOUS; SUBCUTANEOUS at 13:36

## 2019-06-12 RX ADMIN — TAMSULOSIN HYDROCHLORIDE 0.4 MG: 0.4 CAPSULE ORAL at 22:00

## 2019-06-12 RX ADMIN — SODIUM CHLORIDE 100 ML/HR: 900 INJECTION, SOLUTION INTRAVENOUS at 15:00

## 2019-06-12 RX ADMIN — HYDROMORPHONE HYDROCHLORIDE 0.5 MG: 2 INJECTION INTRAMUSCULAR; INTRAVENOUS; SUBCUTANEOUS at 13:26

## 2019-06-12 RX ADMIN — MIDAZOLAM HYDROCHLORIDE 1 MG: 1 INJECTION, SOLUTION INTRAMUSCULAR; INTRAVENOUS at 11:00

## 2019-06-12 RX ADMIN — Medication 2 G: at 19:26

## 2019-06-12 RX ADMIN — TRANEXAMIC ACID 1000 MG: 100 INJECTION, SOLUTION INTRAVENOUS at 11:08

## 2019-06-12 RX ADMIN — TUBERCULIN PURIFIED PROTEIN DERIVATIVE 5 UNITS: 5 INJECTION, SOLUTION INTRADERMAL at 09:00

## 2019-06-12 RX ADMIN — MIDAZOLAM HYDROCHLORIDE 1 MG: 1 INJECTION, SOLUTION INTRAMUSCULAR; INTRAVENOUS at 10:57

## 2019-06-12 RX ADMIN — ASPIRIN 81 MG: 81 TABLET ORAL at 21:14

## 2019-06-12 RX ADMIN — DEXAMETHASONE SODIUM PHOSPHATE 10 MG: 4 INJECTION, SOLUTION INTRA-ARTICULAR; INTRALESIONAL; INTRAMUSCULAR; INTRAVENOUS; SOFT TISSUE at 11:14

## 2019-06-12 RX ADMIN — MIDAZOLAM 2 MG: 1 INJECTION INTRAMUSCULAR; INTRAVENOUS at 10:07

## 2019-06-12 RX ADMIN — CELECOXIB 200 MG: 200 CAPSULE ORAL at 21:14

## 2019-06-12 RX ADMIN — ROPIVACAINE HYDROCHLORIDE 20 MG: 2 INJECTION, SOLUTION EPIDURAL; INFILTRATION; PERINEURAL at 10:09

## 2019-06-12 RX ADMIN — ONDANSETRON 4 MG: 2 INJECTION INTRAMUSCULAR; INTRAVENOUS at 19:51

## 2019-06-12 RX ADMIN — PROPOFOL 50 MCG/KG/MIN: 10 INJECTION, EMULSION INTRAVENOUS at 11:06

## 2019-06-12 RX ADMIN — Medication 1 AMPULE: at 21:14

## 2019-06-12 RX ADMIN — Medication 5 ML: at 15:00

## 2019-06-12 RX ADMIN — INSULIN GLARGINE 20 UNITS: 100 INJECTION, SOLUTION SUBCUTANEOUS at 21:00

## 2019-06-12 RX ADMIN — ACETAMINOPHEN 1000 MG: 10 INJECTION, SOLUTION INTRAVENOUS at 14:05

## 2019-06-12 RX ADMIN — DEXAMETHASONE SODIUM PHOSPHATE 5 MG: 4 INJECTION, SOLUTION INTRA-ARTICULAR; INTRALESIONAL; INTRAMUSCULAR; INTRAVENOUS; SOFT TISSUE at 10:09

## 2019-06-12 RX ADMIN — FENTANYL CITRATE 25 MCG: 50 INJECTION, SOLUTION INTRAMUSCULAR; INTRAVENOUS at 12:34

## 2019-06-12 RX ADMIN — Medication 2 G: at 10:53

## 2019-06-12 RX ADMIN — SODIUM CHLORIDE, SODIUM LACTATE, POTASSIUM CHLORIDE, AND CALCIUM CHLORIDE 100 ML/HR: 600; 310; 30; 20 INJECTION, SOLUTION INTRAVENOUS at 13:30

## 2019-06-12 RX ADMIN — FENTANYL CITRATE 25 MCG: 50 INJECTION, SOLUTION INTRAMUSCULAR; INTRAVENOUS at 11:47

## 2019-06-12 RX ADMIN — ONDANSETRON 4 MG: 2 INJECTION INTRAMUSCULAR; INTRAVENOUS at 11:14

## 2019-06-12 NOTE — OP NOTES
[facility]  Cementless Total Knee Arthroplasty: Posterior Cruciate Retaining     Patient:Chris Freitas   : 1950  Medical Record [id number]  Pre-operative Diagnosis:  Primary osteoarthritis of left knee [M17.12]  Post-operative Diagnosis: same  Location: [address] [place] [address]  Surgeon: Shana Rodrigez MD   Assistant: Britton Padilla    Anesthesia: Spinal and FNB    Procedure:Procedure(s) (LRB):  LEFT KNEE ARTHROPLASTY TOTAL / FNB / Chance Rene (Left)   The complexity of the total joint surgery requires the use of a first assistant for positioning, retraction and expertise in closure. Tourniquet Time: 0 minutes  EBL: 250 cc  Findings: severe degenerative arthritis, patellar osteophytes, posterior femoral osteophytes   BMI: Body mass index is 27.99 kg/m². Bahman James was brought to the operating room and positioned on the operating table. He was anesthestized with anesthesia. A yip catheter was placed preoperatively and IV antibiotics was administered. Prior to the incision being made a timeout was called identifying the patient, procedure ,operative side and surgeon The operative leg was prepped and draped in the usual sterile manner. An anterior longitudinal incision was accomplished just medial to the tibial tubercle and extending approximal 6 centimeters proximal to the superior pole of the patella. A medial parapatellar capsular incision was performed. The medial capsular flap was elevated around to the insertion of the semimembranous tendon. The patella was everted and the knee flexed and externally rotated. The medial and external menisci were excised. The lateral half of the fat pad excised and the patella femoral ligament was released. The anterior cruciate ligament was resect and the posterior cruciate ligament was retained. Using extramedullary instrumentation, the tibial cut was accomplished with appropriate posterior slope.       The distal femur was addressed. A drill hole was made above the intracondylar notch. Using appropriate intramedullary instrumentation,a five degree valgus distal cut was accomplished. The femur was sized. The anterior and posterior cuts were then made about the distal femur. The osteophytes were removed from the tibial and femoral surfaces. The flexion and extension gaps were assessed with the appropriate spacer blocks. Additional surgical procedures included: none. The flexion and extension gaps were deemed appropriately balanced. The appropriate cutting blocks were then utilized to perform the anterior, posterior and chamfer cuts, with appropriate lateral translation accomplished for the patellofemoral groove. Approximately 9 mm of bone was removed from the high side of the tibia. The tibia was sized. .  The tibial base plate was pinned into place with the appropriate external rotation and stem site prepared. A preliminary range of motion was accomplished. The  Patient was found to obtain full extension as well as appropriate flexion. The patient's ligaments were stable in flexion and extension to medial and lateral stressing and the alignment was through the appropriate mechanical axis. The patella was then everted. The bone was resect to accommodate the three peg patella button. A trial reduction revealed appropriate tracking through the patellofemoral groove with no lateral retinacular release being accomplished. All trial components were removed. The real implants were opened: Sizes listed below. The knee was irrigated. There were no femoral deficiencies. There were no tibial deficiencies. No augmentation was utilized. The permanent cementless Tibial and Femoral components were impacted into place. The cementless  patella component was then pressed in place. Chris Vasquezam knee was placed through range of motion and noted to be stable as mentioned above with the trail components.   The wound was dry, therefore no drain was used. The operative knee was injected with 60 cc of Naropin, 10 cc's of morphine and 1 cc of 30 mg of Toradol. The knee was then soaked with a diluted betadine solution for approximately 3 min. This was then thoroughly irrigated. The capsular layer was closed using a #1 PDS suture. Then, 1 gram (100 mg/ml) of Transexamic Acid was injected into the joint space. The subcutaneous layers were closed using 2-0 Stratafix. Finally the skin was closed using 3-0 Vicryl and skin staples, which were applied in occlusive fashion and sterile bandage applied. An Iceman cryo pad was applied on the operative leg. Sponge count and needle counts were correct. Whitney Gonzalez left the operating room     Implants:   Implant Name Type Inv.  Item Serial No.  Lot No. LRB No. Used   COMPNT FEM CR TRIATHLN 6 L PA --  - [id number]  COMPNT FEM CR TRIATHLN 6 L PA --  [id number] [employer] HOW [id number] Left 1   BASEPLT TIB PC TRITNM SZ 6 -- TRIATHLON - -[id number]  BASEPLT TIB PC TRITNM SZ 6 -- TRIATHLON [id number] [employer] HOW [id number] Left 1   PAT ASYM MTL-BK 10MM SZ A35 -- TRIATHLON -   PAT ASYM MTL-BK 10MM SZ A35 -- TRIATHLON H113 [employer] HOW H113 Left 1   Tibial Bearing Insert-cr   [id number]  [id number] Left 1         Signed By: Hayde Spence MD   6/12/2019,  3:34 PM

## 2019-06-12 NOTE — ANESTHESIA PREPROCEDURE EVALUATION
Relevant Problems   No relevant active problems       Anesthetic History               Review of Systems / Medical History  Patient summary reviewed, nursing notes reviewed and pertinent labs reviewed    Pulmonary                   Neuro/Psych              Cardiovascular    Hypertension: well controlled              Exercise tolerance: >4 METS     GI/Hepatic/Renal     GERD: well controlled           Endo/Other    Diabetes: well controlled, type 2, using insulin         Other Findings              Physical Exam    Airway  Mallampati: II  TM Distance: 4 - 6 cm  Neck ROM: normal range of motion   Mouth opening: Normal     Cardiovascular  Regular rate and rhythm,  S1 and S2 normal,  no murmur, click, rub, or gallop             Dental  No notable dental hx       Pulmonary  Breath sounds clear to auscultation               Abdominal         Other Findings            Anesthetic Plan    ASA: 3  Anesthesia type: spinal      Post-op pain plan if not by surgeon: peripheral nerve block single    Induction: Intravenous  Anesthetic plan and risks discussed with: Patient

## 2019-06-12 NOTE — PROGRESS NOTES
TRANSFER - OUT REPORT:    Verbal report given to [facility], RN on Lizeth Nagy  being transferred to [facility] for ordered procedure       Report consisted of patients Situation, Background, Assessment and   Recommendations(SBAR). Information from the following report(s) SBAR, Kardex, OR Summary, Procedure Summary and MAR was reviewed with the receiving nurse. Lines:   Peripheral IV 06/12/19 Left; Inner Arm (Active)   Site Assessment Clean, dry, & intact 6/12/2019  1:52 PM   Phlebitis Assessment 0 6/12/2019  1:52 PM   Infiltration Assessment 0 6/12/2019  1:52 PM   Dressing Status Clean, dry, & intact 6/12/2019  1:52 PM   Dressing Type Transparent;Tape 6/12/2019  1:52 PM   Hub Color/Line Status Green; Infusing 6/12/2019  1:52 PM   Action Taken Blood drawn 6/12/2019  8:47 AM        Opportunity for questions and clarification was provided.       Patient transported with:   [employer]

## 2019-06-12 NOTE — ANESTHESIA PROCEDURE NOTES
Peripheral Block    Start time: 6/12/2019 10:07 AM  End time: 6/12/2019 10:09 AM  Performed by: Burt Marques MD  Authorized by: Burt Marques MD       Pre-procedure: Indications: at surgeon's request and post-op pain management    Preanesthetic Checklist: patient identified, risks and benefits discussed, site marked, timeout performed, anesthesia consent given and patient being monitored    Timeout Time: 10:07          Block Type:   Block Type:   Adductor canal  Laterality:  Left  Monitoring:  Standard ASA monitoring, continuous pulse ox, frequent vital sign checks, heart rate, oxygen and responsive to questions  Injection Technique:  Single shot  Procedures: ultrasound guided    Patient Position: supine  Prep: chlorhexidine    Location:  Mid thigh  Needle Type:  Stimuplex  Needle Gauge:  21 G  Needle Localization:  Ultrasound guidance and anatomical landmarks    Assessment:  Number of attempts:  1  Injection Assessment:  Incremental injection every 5 mL, local visualized surrounding nerve on ultrasound, negative aspiration for blood, no paresthesia, no intravascular symptoms and ultrasound image on chart  Patient tolerance:  Patient tolerated the procedure well with no immediate complications
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
## 2019-06-12 NOTE — ANESTHESIA POSTPROCEDURE EVALUATION
Procedure(s):  LEFT KNEE ARTHROPLASTY TOTAL / FNB / GELACIO.    spinal, regional    Anesthesia Post Evaluation      Multimodal analgesia: multimodal analgesia used between 6 hours prior to anesthesia start to PACU discharge  Patient location during evaluation: PACU  Patient participation: complete - patient participated  Level of consciousness: awake and awake and alert  Pain management: adequate  Airway patency: patent  Anesthetic complications: no  Cardiovascular status: acceptable  Respiratory status: acceptable  Hydration status: acceptable  Post anesthesia nausea and vomiting:  controlled      Vitals Value Taken Time   /76 6/12/2019  1:27 PM   Temp 36.4 °C (97.5 °F) 6/12/2019  1:07 PM   Pulse 66 6/12/2019  1:27 PM   Resp 13 6/12/2019  1:27 PM   SpO2 99 % 6/12/2019  1:27 PM

## 2019-06-12 NOTE — H&P
The patient has end stage arthritis of the left knee. The patient was seen and examined and there are no changes to the patient's orthopedic condition. The necessity for the joint replacement is still present, and the H&P from the office is still current. The patient will be admitted today for Procedure(s) (LRB):  LEFT KNEE ARTHROPLASTY TOTAL / FNB / Unice Pancake (Left) .

## 2019-06-12 NOTE — PERIOP NOTES
Betadine lavage:  17.5cc of betadine lot #  [id number], exp. Date  10/2020,  in 500cc of . 9NS Lot #  [id number], exp.  Date : 03/01/2022

## 2019-06-12 NOTE — PROGRESS NOTES
06/12/19 1430   Oxygen Therapy   O2 Sat (%) 97 %   Pulse via Oximetry 55 beats per minute   O2 Device Nasal cannula   O2 Flow Rate (L/min) 2 l/min  (weaned to room air)   Patient encouraged to do IS every hour while awake-patient agreed and demonstrated. No shortness of breath or distress noted. BS are clear b/l.    [facility] notes reviewed

## 2019-06-12 NOTE — PROGRESS NOTES
TRANSFER - IN REPORT:    Verbal report received from Carly(name) on Margret Dias  being received from [facility]) for routine post - op      Report consisted of patients Situation, Background, Assessment and   Recommendations(SBAR). Information from the following report(s) SBAR, Kardex, Intake/Output and MAR was reviewed with the receiving nurse. Opportunity for questions and clarification was provided. Assessment completed upon patients arrival to unit and care assumed.

## 2019-06-12 NOTE — PROGRESS NOTES
Pt continues to relay consistency in pain;6/10. Pt has had 2 mg of dilaudid. Dr. Liliana Landry notified and order given for 1 gram of ofirmev.

## 2019-06-12 NOTE — PROGRESS NOTES
Assessment complete. Pt alert and oriented X4. Moving BLE. Pedal pulses are palpable. No signs of distress noted. Oriented pt to room, unit, dining on call, IS, and pain management. Pt on room air. Lungs clear to auscultation. Bowel sounds present. IV intact with no redness, or swelling noted infusing. Incision to left knee is covered with Aquacel dressing. Ice applied and plexi-pulses on. Pt denies pain. Call light within reach and bed in low position and locked. Pt informed to call out for needs verbalizes understanding. Will continue to monitor through hourly rounding. Visitor at bedside.

## 2019-06-12 NOTE — ANESTHESIA PROCEDURE NOTES
Spinal Block    Start time: 6/12/2019 10:59 AM  End time: 6/12/2019 11:03 AM  Performed by: Sulaiman Frausto MD  Authorized by: Sulaiman Frausto MD     Pre-procedure:   Indications: primary anesthetic  Preanesthetic Checklist: patient identified, risks and benefits discussed, anesthesia consent, site marked, patient being monitored and timeout performed    Timeout Time: 10:59          Spinal Block:   Patient Position:  Seated  Prep Region:  Lumbar  Prep: chlorhexidine      Location:  L3-4  Technique:  Single shot    Local Dose (mL):  3    Needle:   Needle Type:  Pencan  Needle Gauge:  25 G  Attempts:  1      Events: CSF confirmed, no blood with aspiration and no paresthesia        Assessment:  Insertion:  Uncomplicated  Patient tolerance:  Patient tolerated the procedure well with no immediate complications  mepivicaine 60 mg sab

## 2019-06-12 NOTE — PROGRESS NOTES
Problem: Self Care Deficits Care Plan (Adult)  Goal: *Acute Goals and Plan of Care (Insert Text)  Description  GOALS:   DISCHARGE GOALS (in preparation for going home/rehab):  3 days  1. Mr. Keshia Reid will perform one lower body dressing activity with minimal assistance required to demonstrate improved functional mobility and safety. 2.  Mr. Keshia Reid will perform one lower body bathing activity with minimal assistance required to demonstrate improved functional mobility and safety. 3.  Mr. Keshia Reid will perform toileting/toilet transfer with contact guard assistance to demonstrate improved functional mobility and safety. 4.  Mr. Keshia Reid will perform shower transfer with contact guard assistance to demonstrate improved functional mobility and safety. JOINT CAMP OCCUPATIONAL THERAPY TKA: Initial Assessment and Daily Note 6/12/2019  INPATIENT: Hospital Day: 1  Payor: [facility] MEDICARE / Plan: [employer] [place] MED[place]/PPO / Product Type: Managed Care Medicare /      NAME/AGE/GENDER: Ofe Horowitz is a 79 y.o. male   PRIMARY DIAGNOSIS:  Primary osteoarthritis of left knee [M17.12]   Procedure(s) and Anesthesia Type:     * LEFT KNEE ARTHROPLASTY TOTAL / FNB / GELACIO - Spinal (Left)  ICD-10: Treatment Diagnosis:    Pain in Left Knee (M25.562)  Stiffness of Left Knee, Not elsewhere classified (E66.611)      ASSESSMENT:     Mr. Keshia Reid is s/p Left TKA and presents with decreased weight bearing on L LE and decreased independence with functional mobility and activities of daily living as compared to baseline level of function and safety. Patient would benefit from skilled Occupational Therapy to maximize independence and safety with self-care task and functional mobility. Pt would also benefit from education on adaptive equipment and safety precautions in preparation for going home with daughter vs STR. Able to use urinal at edge of bed with extra time. SPT from bed to recliner.  Should progress well with ADL's tomorrow. This section established at most recent assessment   PROBLEM LIST (Impairments causing functional limitations):  Decreased Strength  Decreased ADL/Functional Activities  Decreased Transfer Abilities  Increased Pain  Increased Fatigue  Decreased Flexibility/Joint Mobility  Decreased Knowledge of Precautions   INTERVENTIONS PLANNED: (Benefits and precautions of occupational therapy have been discussed with the patient.)  Activities of daily living training  Adaptive equipment training  Balance training  Clothing management  Donning&doffing training  Theraputic activity     TREATMENT PLAN: Frequency/Duration: Follow patient 1-2tx to address above goals. Rehabilitation Potential For Stated Goals: Excellent     RECOMMENDED REHABILITATION/EQUIPMENT: (at time of discharge pending progress): Continue Skilled Therapy. OCCUPATIONAL PROFILE AND HISTORY:   History of Present Injury/Illness (Reason for Referral): Pt presents this date s/p (left) TKA. Past Medical History/Comorbidities:   Mr. Cecily Castellano  has a past medical history of Arthritis, BPH (benign prostatic hyperplasia), Diabetes ([facility] [address] GERD (gastroesophageal reflux disease), Hepatitis C, HTN (hypertension), Prostate cancer ([facility] [address] Sepsis ([facility] [address] (04/2018), and Status post total knee replacement, left (6/12/2019). Mr. Cecily Castellano  has a past surgical history that includes hx other surgical (03/30/2018); hx orthopaedic (1972); hx knee arthroscopy (Left, 1994); hx hemorrhoidectomy (1973); hx colonoscopy (2016); and hx heart catheterization (2001). Social History/Living Environment:   Home Environment: Private residence  Living Alone: Yes  Support Systems: Child(mick)(daughter)  Patient Expects to be Discharged to[de-identified] Private residence(vs STR)  Prior Level of Function/Work/Activity:  Independent prior. Lives alone.       Number of Personal Factors/Comorbidities that affect the Plan of Care: Brief history (0):  LOW COMPLEXITY   ASSESSMENT OF OCCUPATIONAL PERFORMANCE[de-identified]   Most Recent Physical Functioning:   Balance  Sitting: Intact  Standing: With support                    Coordination  Fine Motor Skills-Upper: Left Intact; Right Intact  Gross Motor Skills-Upper: Left Intact; Right Intact         Mental Status  Neurologic State: Alert  Orientation Level: Oriented X4  Cognition: Appropriate decision making  Perception: Appears intact                Basic ADLs (From Assessment) Complex ADLs (From Assessment)   Basic ADL  Feeding: Independent  Oral Facial Hygiene/Grooming: Setup  Bathing: Minimum assistance  Upper Body Dressing: Setup  Lower Body Dressing: Moderate assistance  Toileting: Moderate assistance     Grooming/Bathing/Dressing Activities of Daily Living                       Functional Transfers  Shower Transfer: Moderate assistance     Bed/Mat Mobility  Supine to Sit: Minimum assistance  Sit to Stand: Contact guard assistance  Stand to Sit: Contact guard assistance  Bed to Chair: Contact guard assistance  Scooting: Contact guard assistance         Physical Skills Involved:  Range of Motion  Balance  Strength Cognitive Skills Affected (resulting in the inability to perform in a timely and safe manner):  wfl  Psychosocial Skills Affected:  WFL    Number of elements that affect the Plan of Care: 1-3:  LOW COMPLEXITY   CLINICAL DECISION MAKIN [address]? ?6 Clicks? Daily Activity Inpatient Short Form  How much help from another person does the patient currently need. .. Total A Lot A Little None   1. Putting on and taking off regular lower body clothing? ? 1   ? 2   ? 3   ? 4   2. Bathing (including washing, rinsing, drying)? ? 1   ? 2   ? 3   ? 4   3. Toileting, which includes using toilet, bedpan or urinal?   ? 1   ? 2   ? 3   ? 4   4. Putting on and taking off regular upper body clothing? ? 1   ? 2   ? 3   ? 4   5. Taking care of personal grooming such as brushing teeth? ? 1   ? 2   ? 3   ? 4   6. Eating meals?    ? 1 ? 2   ? 3   ? 4   © 2007, Trustees of [address] [place] [address] [address] [id number], under license to [employer]. All rights reserved     Score:  Initial: 18 Most Recent: X (Date: -- )    Interpretation of Tool:  Represents activities that are increasingly more difficult (i.e. Bed mobility, Transfers, Gait). Medical Necessity:     Skilled intervention continues to be required due to Deficits listed above. Reason for Services/Other Comments:  Patient continues to require skilled intervention due to new TKA   . Use of outcome tool(s) and clinical judgement create a POC that gives a: MODERATE COMPLEXITY            TREATMENT:   (In addition to Assessment/Re-Assessment sessions the following treatments were rendered)     Pre-treatment Symptoms/Complaints:    Pain: Initial:   Pain Intensity 1: 3  Post Session:  3     Self Care: (10): Procedure(s) (per grid) utilized to improve and/or restore self-care/home management as related to dressing, bathing, toileting and grooming. Required minimal verbal and tactile cueing to facilitate activities of daily living skills. Initial evaluation 10 minutes. Treatment/Session Assessment:     Response to Treatment:  Good, siting up in recliner. Education:  ? Home Exercises  ? Fall Precautions  ? Hip Precautions ? Going Home Video  ? Knee/Hip Prosthesis Review  ? Walker Management/Safety ? Adaptive Equipment as Needed       Interdisciplinary Collaboration:   Physical Therapist  Occupational Therapist  Registered Nurse    After treatment position/precautions:   Up in chair  Bed/Chair-wheels locked  Caregiver at bedside  Call light within reach  RN notified     Compliance with Program/Exercises: Compliant all of the time, Will assess as treatment progresses. Recommendations/Intent for next treatment session:  Treatment next visit will focus on increasing Mr. Freitas's independence with bed mobility, transfers, self care, functional mobility, modalities for pain, and patient education.       Total Treatment Duration:  OT Patient Time In/Time Out  Time In: 1505  Time Out: [address], OT

## 2019-06-12 NOTE — PROGRESS NOTES
Problem: Mobility Impaired (Adult and Pediatric)  Goal: *Acute Goals and Plan of Care (Insert Text)  Note:   GOALS (1-4 days):  (1.)Mr. Jack Zhao will move from supine to sit and sit to supine  in bed with CONTACT GUARD ASSIST.  (2.)Mr. Jack Zhao will transfer from bed to chair and chair to bed with SUPERVISION using the least restrictive device. (3.)Mr. Jack Zhao will ambulate with SUPERVISION for 150 feet with the least restrictive device. (4.)Mr. Jack Zhao will ambulate up/down 5 steps with bilateral  railing with CONTACT GUARD ASSIST with no device. (5.)Mr. Jack Zhao will increase left knee ROM to 5°-80°.  ________________________________________________________________________________________________      PHYSICAL THERAPY JOINT CAMP TKA: Initial Assessment and PM 6/12/2019  INPATIENT: Hospital Day: 1  Payor: [facility] MEDICARE / Plan: [employer] [facility] MEDICARE HMO/PPO / Product Type: Managed Care Medicare /      NAME/AGE/GENDER: Mj Sung is a 79 y.o. male   PRIMARY DIAGNOSIS:  Primary osteoarthritis of left knee [M17.12]   Procedure(s) and Anesthesia Type:     * LEFT KNEE ARTHROPLASTY TOTAL / FNB / GELACIO - Spinal (Left)  ICD-10: Treatment Diagnosis:    Pain in Left Knee (M25.562)  Stiffness of Left Knee, Not elsewhere classified (K46.358)  Other abnormalities of gait and mobility (R26.89)      ASSESSMENT:     Mr. Jack Zhao presents S/P L TKA and demonstrates a decline in his premorbid level of function. He presents with decreased L LE strength and ROM, standing balance, functional mobility and TKA awareness. He would benefit from further PT while here to address these issues. Since he lives alone, he plans on trying to go to re[facility] [place]. He is interested in [facility]. This pm he is lethargic from pain meds. He is able to perform L TKA exs and amb in room. He has a friend with him who is supportive.     This section established at most recent assessment   PROBLEM LIST (Impairments causing functional limitations):  Decreased Strength  Decreased Transfer Abilities  Decreased Ambulation Ability/Technique  Decreased Balance  Increased Pain  Decreased Activity Tolerance  Increased Fatigue  Decreased Flexibility/Joint Mobility  Decreased Knowledge of Precautions  Decreased [place] with Home Exercise Program   INTERVENTIONS PLANNED: (Benefits and precautions of physical therapy have been discussed with the patient.)  TKA education   bed mobility  gait training  home exercise program (HEP)  Range of Motion: active/assisted/passive  Therapeutic Activities  therapeutic exercise/strengthening  transfer training  Group Therapy     TREATMENT PLAN: Frequency/Duration: Follow patient BID for duration of hospital stay to address above goals. Rehabilitation Potential For Stated Goals: Good     RECOMMENDED REHABILITATION/EQUIPMENT: (at time of discharge pending progress): Continue Skilled Therapy and Home Health: Physical Therapy. But seems to be interested in rehab              HISTORY:   History of Present Injury/Illness (Reason for Referral):  S/P L TKA  Past Medical History/Comorbidities:   Mr. Joel Hinkle  has a past medical history of Arthritis, BPH (benign prostatic hyperplasia), Diabetes ([facility] [address] GERD (gastroesophageal reflux disease), Hepatitis C, HTN (hypertension), Prostate cancer ([facility] [address] Sepsis ([facility] [address] (04/2018), and Status post total knee replacement, left (6/12/2019). Mr. Joel Hinkle  has a past surgical history that includes hx other surgical (03/30/2018); hx orthopaedic (1972); hx knee arthroscopy (Left, 1994); hx hemorrhoidectomy (1973); hx colonoscopy (2016); and hx heart catheterization (2001).   Social History/Living Environment:   Home Environment: Private residence  # Steps to Enter: 1  Rails to Enter: No  One/Two Story Residence: One story  Living Alone: Yes  Support Systems: Child(mick), Friends \ neighbors  Patient Expects to be Discharged to[de-identified] Rehabilitation facility([facility] [place]  Current DME Used/Available at Home: Mirna Garcia, straight, Walker, Crutches, Commode, bedside, Glucometer  Tub or Shower Type: Tub/Shower combination  Prior Level of Function/Work/Activity:  Functionally I with ADls and amb. Is a chen   Number of Personal Factors/Comorbidities that affect the Plan of Care: 1-2: MODERATE COMPLEXITY   EXAMINATION:   Most Recent Physical Functioning:      Gross Assessment  AROM: Within functional limits(R LE)  Strength: Within functional limits(R LE)  Sensation: Intact(R LE)          LLE AROM  L Knee Flexion: 60  L Knee Extension: 20     LLE Strength  L Hip Flexion: 2+  L Knee Extension: 2+  L Ankle Dorsiflexion: 2+    Bed Mobility  Supine to Sit: Minimum assistance  Scooting: Contact guard assistance    Transfers  Sit to Stand: Contact guard assistance  Stand to Sit: Contact guard assistance  Stand Pivot Transfers: Contact guard assistance  Bed to Chair: Contact guard assistance    Balance  Sitting: Intact  Standing: With support              Weight Bearing Status  Left Side Weight Bearing: As tolerated  Distance (ft): 50 Feet (ft)  Ambulation - Level of Assistance: Contact guard assistance  Assistive Device: Walker, rolling  Speed/Tamar: Pace decreased (<100 feet/min)  Step Length: Right shortened  Stance: Left decreased  Gait Abnormalities: Antalgic; Step to gait  Interventions: Safety awareness training;Verbal cues     Braces/Orthotics:     Left Knee Cold  Type: Cryocuff      Body Structures Involved:  Bones  Joints  Muscles Body Functions Affected:  Neuromusculoskeletal  Movement Related Activities and Participation Affected:  General Tasks and Demands  Mobility  Self Care   Number of elements that affect the Plan of Care: 4+: HIGH COMPLEXITY   CLINICAL PRESENTATION:   Presentation: Stable and uncomplicated: LOW COMPLEXITY   CLINICAL DECISION MAKIN [address]? ?6 Clicks? Basic Mobility Inpatient Short Form  How much difficulty does the patient currently have. .. Unable A Lot A Little None   1. Turning over in bed (including adjusting bedclothes, sheets and blankets)? ? 1   ? 2   ? 3   ? 4   2. Sitting down on and standing up from a chair with arms ( e.g., wheelchair, bedside commode, etc.)   ? 1   ? 2   ? 3   ? 4   3. Moving from lying on back to sitting on the side of the bed?   ? 1   ? 2   ? 3   ? 4   How much help from another person does the patient currently need. .. Total A Lot A Little None   4. Moving to and from a bed to a chair (including a wheelchair)? ? 1   ? 2   ? 3   ? 4   5. Need to walk in hospital room? ? 1   ? 2   ? 3   ? 4   6. Climbing 3-5 steps with a railing? ? 1   ? 2   ? 3   ? 4   © 2007, Trustees of [facility] [employer], under license to [employer]. All rights reserved     Score:  Initial: 17 Most Recent: X (Date: -- )    Interpretation of Tool:  Represents activities that are increasingly more difficult (i.e. Bed mobility, Transfers, Gait). Medical Necessity:     Patient demonstrates good   rehab potential due to higher previous functional level. Reason for Services/Other Comments:  Patient continues to require present interventions due to patient's inability to perform functional mobility at a safe supervision level   . Use of outcome tool(s) and clinical judgement create a POC that gives a: Clear prediction of patient's progress: LOW COMPLEXITY            TREATMENT:   (In addition to Assessment/Re-Assessment sessions the following treatments were rendered)     Pre-treatment Symptoms/Complaints:  lethargic but did well with cues. Pain: Initial:   Pain Intensity 1: 3  Pain Location 1: Knee  Pain Orientation 1: Left  Pain Intervention(s) 1: Ambulation/Increased Activity, Cold pack, Elevation, Exercise, Repositioned  Post Session:  2, cryocuff applied     Therapeutic Exercise: (10 Minutes):  Exercises per grid below to improve mobility, strength and coordination.   Required minimal visual, verbal and tactile cues to promote proper body alignment and promote proper body breathing techniques. Progressed range, repetitions and complexity of movement as indicated. Assessment: 10 minutes    Date:  6/12/19 Date:   Date:     ACTIVITY/EXERCISE AM PM AM PM AM PM   GROUP THERAPY  ?  ?  ?  ?  ?  ? Ankle Pumps  10       Quad Sets  10       Gluteal Sets  10       Hip ABd/ADduction  10AA       Straight Leg Raises  10AA       Knee Slides  10AA       Short Arc Quads  10       Long Arc Quads         Chair Slides                  B = bilateral; AA = active assistive; A = active; P = passive      Treatment/Session Assessment:     Response to Treatment:  did well. .    Education:  ? Home Exercises  ? Fall Precautions  ? Hip Precautions ? D/C Instruction Review  ? Knee/Hip Prosthesis Review  ? Walker Management/Safety ? Adaptive Equipment as Needed       Interdisciplinary Collaboration:   Physical Therapist  Occupational Therapist  Registered Nurse    After treatment position/precautions:   Up in chair  Bed/Chair-wheels locked  Call light within reach  RN notified  Visitors at bedside    Compliance with Program/Exercises: Will assess as treatment progresses. Recommendations/Intent for next treatment session:  Treatment next visit will focus on increasing Mr. Morrisseys independence with bed mobility, transfers, gait training, strength/ROM exercises, modalities for pain, and patient education.       Total Treatment Duration:  PT Patient Time In/Time Out  Time In: 5905  Time Out: Bing 27, PT

## 2019-06-13 LAB
ANION GAP SERPL CALC-SCNC: 11 MMOL/L (ref 7–16)
BUN SERPL-MCNC: 17 MG/DL (ref 8–23)
CALCIUM SERPL-MCNC: 8.2 MG/DL (ref 8.3–10.4)
CHLORIDE SERPL-SCNC: 106 MMOL/L (ref 98–107)
CO2 SERPL-SCNC: 22 MMOL/L (ref 21–32)
CREAT SERPL-MCNC: 1.09 MG/DL (ref 0.8–1.5)
EST. AVERAGE GLUCOSE BLD GHB EST-MCNC: 183 MG/DL
GLUCOSE SERPL-MCNC: 244 MG/DL (ref 65–100)
HBA1C MFR BLD: 8 %
HGB BLD-MCNC: 11 G/DL (ref 13.6–17.2)
MM INDURATION POC: 0 MM (ref 0–5)
POTASSIUM SERPL-SCNC: 4.6 MMOL/L (ref 3.5–5.1)
PPD POC: NEGATIVE NEGATIVE
SODIUM SERPL-SCNC: 139 MMOL/L (ref 136–145)

## 2019-06-13 PROCEDURE — 97116 GAIT TRAINING THERAPY: CPT

## 2019-06-13 PROCEDURE — 74011636637 HC RX REV CODE- 636/637: Performed by: PHYSICIAN ASSISTANT

## 2019-06-13 PROCEDURE — 97535 SELF CARE MNGMENT TRAINING: CPT

## 2019-06-13 PROCEDURE — 74011250636 HC RX REV CODE- 250/636: Performed by: PHYSICIAN ASSISTANT

## 2019-06-13 PROCEDURE — 85018 HEMOGLOBIN: CPT

## 2019-06-13 PROCEDURE — 83036 HEMOGLOBIN GLYCOSYLATED A1C: CPT

## 2019-06-13 PROCEDURE — 36415 COLL VENOUS BLD VENIPUNCTURE: CPT

## 2019-06-13 PROCEDURE — 97150 GROUP THERAPEUTIC PROCEDURES: CPT

## 2019-06-13 PROCEDURE — 80048 BASIC METABOLIC PNL TOTAL CA: CPT

## 2019-06-13 PROCEDURE — 74011250637 HC RX REV CODE- 250/637: Performed by: PHYSICIAN ASSISTANT

## 2019-06-13 PROCEDURE — 65270000029 HC RM PRIVATE

## 2019-06-13 PROCEDURE — 97110 THERAPEUTIC EXERCISES: CPT

## 2019-06-13 PROCEDURE — 74011250637 HC RX REV CODE- 250/637: Performed by: ORTHOPAEDIC SURGERY

## 2019-06-13 RX ADMIN — METFORMIN HYDROCHLORIDE 500 MG: 500 TABLET, FILM COATED ORAL at 21:00

## 2019-06-13 RX ADMIN — Medication 10 ML: at 21:44

## 2019-06-13 RX ADMIN — HYDROMORPHONE HYDROCHLORIDE 1 MG: 2 INJECTION INTRAMUSCULAR; INTRAVENOUS; SUBCUTANEOUS at 21:41

## 2019-06-13 RX ADMIN — ACETAMINOPHEN 1000 MG: 500 TABLET, FILM COATED ORAL at 02:42

## 2019-06-13 RX ADMIN — HYDROMORPHONE HYDROCHLORIDE 2 MG: 2 TABLET ORAL at 00:45

## 2019-06-13 RX ADMIN — TAMSULOSIN HYDROCHLORIDE 0.4 MG: 0.4 CAPSULE ORAL at 21:43

## 2019-06-13 RX ADMIN — ASPIRIN 81 MG: 81 TABLET ORAL at 08:51

## 2019-06-13 RX ADMIN — ACETAMINOPHEN 1000 MG: 500 TABLET, FILM COATED ORAL at 16:07

## 2019-06-13 RX ADMIN — CELECOXIB 200 MG: 200 CAPSULE ORAL at 08:51

## 2019-06-13 RX ADMIN — CELECOXIB 200 MG: 200 CAPSULE ORAL at 21:43

## 2019-06-13 RX ADMIN — Medication 1 AMPULE: at 21:45

## 2019-06-13 RX ADMIN — SENNOSIDES AND DOCUSATE SODIUM 2 TABLET: 8.6; 5 TABLET ORAL at 08:51

## 2019-06-13 RX ADMIN — FAMOTIDINE 10 MG: 20 TABLET, FILM COATED ORAL at 09:00

## 2019-06-13 RX ADMIN — Medication 10 ML: at 21:43

## 2019-06-13 RX ADMIN — HYDROMORPHONE HYDROCHLORIDE 2 MG: 2 TABLET ORAL at 04:38

## 2019-06-13 RX ADMIN — ACETAMINOPHEN 1000 MG: 500 TABLET, FILM COATED ORAL at 08:51

## 2019-06-13 RX ADMIN — Medication 1 AMPULE: at 08:46

## 2019-06-13 RX ADMIN — ACETAMINOPHEN 1000 MG: 500 TABLET, FILM COATED ORAL at 21:43

## 2019-06-13 RX ADMIN — HYDROMORPHONE HYDROCHLORIDE 2 MG: 2 TABLET ORAL at 08:51

## 2019-06-13 RX ADMIN — INSULIN GLARGINE 20 UNITS: 100 INJECTION, SOLUTION SUBCUTANEOUS at 21:00

## 2019-06-13 RX ADMIN — Medication 5 ML: at 14:00

## 2019-06-13 RX ADMIN — ASPIRIN 81 MG: 81 TABLET ORAL at 21:43

## 2019-06-13 RX ADMIN — Medication 2 G: at 02:42

## 2019-06-13 RX ADMIN — CYANOCOBALAMIN TAB 1000 MCG 1000 MCG: 1000 TAB at 09:00

## 2019-06-13 RX ADMIN — LOSARTAN POTASSIUM 50 MG: 50 TABLET ORAL at 21:43

## 2019-06-13 NOTE — PROGRESS NOTES
Care Management Interventions  PCP Verified by CM: Yes  Mode of Transport at Discharge: BLS  Transition of Care Consult (CM Consult): SNF  Partner SNF: Yes  Physical Therapy Consult: Yes  Current Support Network: Lives Alone  Plan discussed with Pt/Family/Caregiver: Yes  Freedom of Choice Offered: Yes  Discharge Location  Discharge Placement: Skilled nursing facility    Patient is a 79 yr old male admitted for a left TKA. He lives alone and hopes to go to rehab on d/c. Patient reports he made arrangements to go to [facility] for rehab but we still need to finalize his pay source. Our Business office states Medicare system is showing patient has [facility] Medicare (which [facility] cannot accept) but patient states his [facility] coverage was stopped in April 2019. Patient states he should have Traditional Medicare which would allow an admission to [facility]. Theodore at [facility] is holding a pvt room for him for Sat 6-15 if traditional [facility] confirmed. I have asked our Business office to look into this and we hope to have this resolved by Matt orellana. If [facility] is still in effect then Precert will be required with another facility chosen. Will follow.  Liudmila Dia

## 2019-06-13 NOTE — PROGRESS NOTES
Pt up in recliner with family member present. Alert and Oriented x3. Denies pain. No NV deficits noted. +2 pedal pulses. Surgical bandage clean, dry and intact. Call light within reach.

## 2019-06-13 NOTE — DISCHARGE INSTRUCTIONS
[facility]   Patient Discharge Instructions    Dorenda Harada / [id number] : 1950    Admitted 2019 Discharged: 2019     IF YOU HAVE ANY PROBLEMS ONCE YOU ARE AT HOME CALL THE FOLLOWING NUMBERS:   Main office number: [phone]      Medications    · The medications you are to continue on are listed on the medication reconciliation sheet. · Narcotic pain medications as well as supplemental iron can cause constipation. If this occurs try stopping the narcotic pain medication and/or the iron. · It is important that you take the medication exactly as they are prescribed. · Medications which increase your risk of blood clots are listed to stop for 5 weeks after surgery as well as medications or supplements which increase your risk of bleeding complications. · Keep your medication in the bottles provided by the pharmacist and keep a list of the medication names, dosages, and times to be taken in your wallet. · Do not take other medications without consulting your doctor. Important Information    Do NOT smoke as this will greatly increase your risk of infection! Resume your prehospital diet. If you have excessive nausea or vomitting call your doctor's office     Leg swelling and warmth is normal for 6 months after surgery. If you experience swelling in your leg elevate you leg while laying down with your toes above your heart. If you have sudden onset severe swelling with leg pain call our office. Use Ilir Hose stockings until we see you in the office for your follow up appointment. The stitches deep inside take approximately 6 months to dissolve. There will be sharp shooting, stinging and burning pain. This is normal and will resolve between 3-6 months after surgery. Difficulty sleeping is normal following total Knee and Hip replacement. You may try melatonin, an over-the-counter sleep aid or benadryl to help with sleep.  Most patients will resume sleeping through the night 8 weeks after surgery. Home Physical Therapy is arranged. Home Health will contact you within 48 hrs of discharge that you have chosen. If you have not received a call within this time frame please contact that provider you chose. You should be given this information before you leave the hospital.     You are at a risk for falls. Use the rolling walker when walking. Patients who have had a joint replacement should not drive if they are still taking narcotic pain mediation during the daytime hours. Most patients wean themselves off of pain medication within 2-5 weeks after surgery. When to Call the office    - If you have a temperature greater then 101  - Uncontrolled vomiting   - Loose control of your bladder or bowel function  - Are unable to bear any wieght   - Need a pain medication refill     Information obtained by :  I understand that if any problems occur once I am at home I am to contact my physician. I understand and acknowledge receipt of the instructions indicated above. Physician's or R.N.'s Signature                                                                  Date/Time                                                                                                                                              Patient or Representative Signature                                                          Date/Time          Patient Education        Total Knee Replacement: What to Expect at Home  Your Recovery    When you leave the hospital, you should be able to move around with a walker or crutches. But you will need someone to help you at home for the next few weeks or until you have more energy and can move around better. If there is no one to help you at home, you may go to a rehabilitation center.   You will go home with a bandage and stitches, staples, tissue glue, or tape strips. Change the bandage as your doctor tells you to. If you have stitches or staples, your doctor will remove them 10 to 21 days after your surgery. Glue or tape strips will fall off on their own over time. You may still have some mild pain, and the area may be swollen for 3 to 6 months after surgery. Your knee will continue to improve for 6 to 12 months. You will probably use a walker for 1 to 3 weeks and then use crutches. When you are ready, you can use a cane. You will probably be able to walk on your own in 4 to 8 weeks. You will need to do months of physical rehabilitation (rehab) after a knee replacement. Rehab will help you strengthen the muscles of the knee and help you regain movement. After you recover, your artificial knee will allow you to do normal daily activities with less pain or no pain at all. You may be able to hike, dance, ride a bike, and play golf. Talk to your doctor about whether you can do more strenuous activities. Always tell your caregivers that you have an artificial knee. How long it will take to walk on your own, return to normal activities, and go back to work depends on your health and how well your rehabilitation (rehab) program goes. The better you do with your rehab exercises, the quicker you will get your strength and movement back. This care sheet gives you a general idea about how long it will take for you to recover. But each person recovers at a different pace. Follow the steps below to get better as quickly as possible. How can you care for yourself at home? Activity    · Rest when you feel tired. You may take a nap, but do not stay in bed all day. When you sit, use a chair with arms. You can use the arms to help you stand up.     · Work with your physical therapist to find the best way to exercise. You may be able to take frequent, short walks using crutches or a walker.  What you can do as your knee heals will depend on whether your new knee is cemented or uncemented. You may not be able to do certain things for a while if your new knee is uncemented.     · After your knee has healed enough, you can do more strenuous activities with caution. ? You can golf, but use a golf cart, and do not wear shoes with spikes. ? You can bike on a flat road or on a stationary bike. Avoid biking up hills. ? Your doctor may suggest that you stay away from activities that put stress on your knee. These include tennis or badminton, squash or racquetball, contact sports like football, jumping (such as in basketball), jogging, or running. ? Avoid activities where you might fall. These include horseback riding, skiing, and mountain biking.     · Do not sit for more than 1 hour at a time. Get up and walk around for a while before you sit again. If you must sit for a long time, prop up your leg with a chair or footstool. This will help you avoid swelling.     · Ask your doctor when you can drive again. It may take up to 8 weeks after knee replacement surgery before it is safe for you to drive.     · When you get into a car, sit on the edge of the seat. Then pull in your legs, and turn to face the front.     · You should be able to do many everyday activities 3 to 6 weeks after your surgery. You will probably need to take 4 to 16 weeks off from work. When you can go back to work depends on the type of work you do and how you feel.     · Ask your doctor when it is okay for you to have sex.     · Do not lift anything heavier than 10 pounds and do not lift weights for 12 weeks. Diet    · By the time you leave the hospital, you should be eating your normal diet. If your stomach is upset, try bland, low-fat foods like plain rice, broiled chicken, toast, and yogurt. Your doctor may suggest that you take iron and vitamin supplements.     · Drink plenty of fluids (unless your doctor tells you not to).   · Eat healthy foods, and watch your portion sizes.  Try to stay at your ideal weight. Too much weight puts more stress on your new knee.     · You may notice that your bowel movements are not regular right after your surgery. This is common. Try to avoid constipation and straining with bowel movements. You may want to take a fiber supplement every day. If you have not had a bowel movement after a couple of days, ask your doctor about taking a mild laxative. Medicines    · Your doctor will tell you if and when you can restart your medicines. He or she will also give you instructions about taking any new medicines.     · If you take blood thinners, such as warfarin (Coumadin), clopidogrel (Plavix), or aspirin, be sure to talk to your doctor. He or she will tell you if and when to start taking those medicines again. Make sure that you understand exactly what your doctor wants you to do.     · Your doctor may give you a blood-thinning medicine to prevent blood clots. If you take a blood thinner, be sure you get instructions about how to take your medicine safely. Blood thinners can cause serious bleeding problems. This medicine could be in pill form or as a shot (injection). If a shot is necessary, your doctor will tell you how to do this.     · Be safe with medicines. Take pain medicines exactly as directed. ? If the doctor gave you a prescription medicine for pain, take it as prescribed. ? If you are not taking a prescription pain medicine, ask your doctor if you can take an over-the-counter medicine. ? Plan to take your pain medicine 30 minutes before exercises. It is easier to prevent pain before it starts than to stop it once it has started.     · If you think your pain medicine is making you sick to your stomach:  ? Take your medicine after meals (unless your doctor has told you not to). ? Ask your doctor for a different pain medicine.     · If your doctor prescribed antibiotics, take them as directed. Do not stop taking them just because you feel better.  You need to take the full course of antibiotics. Incision care    · If your doctor told you how to care for your cut (incision), follow your doctor's instructions. You will have a dressing over the cut. A dressing helps the incision heal and protects it. Your doctor will tell you how to take care of this.     · If you did not get instructions, follow this general advice:  ? If you have strips of tape on the cut the doctor made, leave the tape on for a week or until it falls off.  ? If you have stitches or staples, your doctor will tell you when to come back to have them removed. ? If you have skin adhesive on the cut, leave it on until it falls off. Skin adhesive is also called glue or liquid stitches. ? Change the bandage every day. ? Wash the area daily with warm water, and pat it dry. Don't use hydrogen peroxide or alcohol. They can slow healing. ? You may cover the area with a gauze bandage if it oozes fluid or rubs against clothing. ? You may shower 24 to 48 hours after surgery. Pat the incision dry. Don't swim or take a bath for the first 2 weeks, or until your doctor tells you it is okay. Exercise    · Your rehab program will give you a number of exercises to do to help you get back your knee's range of motion and strength. Always do them as your therapist tells you. Ice and elevation    · For pain and swelling, put ice or a cold pack on the area for 10 to 20 minutes at a time. Put a thin cloth between the ice and your skin. Other instructions    · Continue to wear your support stockings as your doctor says. These help to prevent blood clots. The length of time that you will have to wear them depends on your activity level and the amount of swelling.     · You have metal pieces in your knee. These may set off some airport metal detectors. Carry a medical alert card that says you have an artificial joint, just in case. Follow-up care is a key part of your treatment and safety.  Be sure to make and go to all appointments, and call your doctor if you are having problems. It's also a good idea to know your test results and keep a list of the medicines you take. When should you call for help? Call 911 anytime you think you may need emergency care. For example, call if:    · You passed out (lost consciousness).     · You have severe trouble breathing.     · You have sudden chest pain and shortness of breath, or you cough up blood.    Call your doctor now or seek immediate medical care if:    · You have signs of infection, such as:  ? Increased pain, swelling, warmth, or redness. ? Red streaks leading from the incision. ? Pus draining from the incision. ? A fever.     · You have signs of a blood clot, such as:  ? Pain in your calf, back of the knee, thigh, or groin. ? Redness and swelling in your leg or groin.     · Your incision comes open and begins to bleed, or the bleeding increases.     · You have pain that does not get better after you take pain medicine.    Watch closely for changes in your health, and be sure to contact your doctor if:    · You do not have a bowel movement after taking a laxative. Where can you learn more? Go to http://adam-tato.info/. Enter X989 in the search box to learn more about \"Total Knee Replacement: What to Expect at Home. \"  Current as of: September 20, 2018  Content Version: 11.9  © 1537-1349 [employer]. Care instructions adapted under license by [employer] (which disclaims liability or warranty for this information). If you have questions about a medical condition or this instruction, always ask your healthcare professional. [place] [address] any warranty or liability for your use of this information.

## 2019-06-13 NOTE — PROGRESS NOTES
Chart reviewed. Patient had Knee replacement. PMHx significant for NIDDM  No active medical problems. Will follow patient as needed.       Signed By: Moses Myles MD     June 13, 2019

## 2019-06-13 NOTE — PROGRESS NOTES
Has been up to shower with OT assisting. Dressed and in recliner. Aquacel dry and intact to L knee with iceman. NV checks WDL. Medicated for pain with dilaudid po.

## 2019-06-13 NOTE — PROGRESS NOTES
Problem: Mobility Impaired (Adult and Pediatric)  Goal: *Acute Goals and Plan of Care (Insert Text)  Note:   GOALS (1-4 days):  (1.)Mr. Patt Lesch will move from supine to sit and sit to supine  in bed with CONTACT GUARD ASSIST.  (2.)Mr. Patt Lesch will transfer from bed to chair and chair to bed with SUPERVISION using the least restrictive device. (3.)Mr. Patt Lesch will ambulate with SUPERVISION for 150 feet with the least restrictive device. (4.)Mr. Patt Lesch will ambulate up/down 5 steps with bilateral  railing with CONTACT GUARD ASSIST with no device. (5.)Mr. Patt Lesch will increase left knee ROM to 5°-80°.  ________________________________________________________________________________________________      PHYSICAL THERAPY JOINT CAMP TKA: Daily Note and PM 6/13/2019  INPATIENT: Hospital Day: 2  Payor: [facility] MEDICARE / Plan: [employer] [facility] [place] / Product Type: Managed Care Medicare /      NAME/AGE/GENDER: Austin Menjivar is a 79 y.o. male   PRIMARY DIAGNOSIS:  Primary osteoarthritis of left knee [M17.12]   Procedure(s) and Anesthesia Type:     * LEFT KNEE ARTHROPLASTY TOTAL / FNB / GELACIO - Spinal (Left)  ICD-10: Treatment Diagnosis:    · Pain in Left Knee (M25.562)  · Stiffness of Left Knee, Not elsewhere classified (R78.241)  · Other abnormalities of gait and mobility (R26.89)      ASSESSMENT:     Mr. Patt Lesch presents S/P L TKA and demonstrates a decline in his premorbid level of function. He presents with decreased L LE strength and ROM, standing balance, functional mobility and TKA awareness. He would benefit from further PT while here to address these issues. Since he lives alone, he plans on trying to go to rehab at [facility]. He is interested in [facility]. Pt. Doing well this afternoon. He did well with gait with walker and did tka exercises with cues. Good rom this afternoon. No questions.   This section established at most recent assessment   PROBLEM LIST (Impairments causing functional limitations):  1. Decreased Strength  2. Decreased Transfer Abilities  3. Decreased Ambulation Ability/Technique  4. Decreased Balance  5. Increased Pain  6. Decreased Activity Tolerance  7. Increased Fatigue  8. Decreased Flexibility/Joint Mobility  9. Decreased Knowledge of Precautions  10. Decreased [place] with Home Exercise Program   INTERVENTIONS PLANNED: (Benefits and precautions of physical therapy have been discussed with the patient.)  1. TKA education   2. bed mobility  3. gait training  4. home exercise program (HEP)  5. Range of Motion: active/assisted/passive  6. Therapeutic Activities  7. therapeutic exercise/strengthening  8. transfer training  9. Group Therapy     TREATMENT PLAN: Frequency/Duration: Follow patient BID for duration of hospital stay to address above goals. Rehabilitation Potential For Stated Goals: Good     RECOMMENDED REHABILITATION/EQUIPMENT: (at time of discharge pending progress): Continue Skilled Therapy and Home Health: Physical Therapy. But seems to be interested in rehab              HISTORY:   History of Present Injury/Illness (Reason for Referral):  S/P L TKA  Past Medical History/Comorbidities:   Mr. Yoon Aguilar  has a past medical history of Arthritis, BPH (benign prostatic hyperplasia), Diabetes ([address] GERD (gastroesophageal reflux disease), Hepatitis C, HTN (hypertension), Prostate cancer ([address] Sepsis ([address] (04/2018), and Status post total knee replacement, left (6/12/2019). Mr. Yoon Aguilar  has a past surgical history that includes hx other surgical (03/30/2018); hx orthopaedic (1972); hx knee arthroscopy (Left, 1994); hx hemorrhoidectomy (1973); hx colonoscopy (2016); and hx heart catheterization (2001).   Social History/Living Environment:   Home Environment: Private residence  # Steps to Enter: 1  Rails to Enter: No  One/Two Story Residence: One story  Living Alone: Yes  Support Systems: Hinduism / oxana community  Patient Expects to be Discharged to[de-identified] Rehabilitation facility  Current DME Used/Available at Home: Cane, straight, Commode, bedside, Crutches, Glucometer, Walker  Tub or Shower Type: Tub/Shower combination  Prior Level of Function/Work/Activity:  Functionally I with ADls and amb. Is a chen   Number of Personal Factors/Comorbidities that affect the Plan of Care: 1-2: MODERATE COMPLEXITY   EXAMINATION:   Most Recent Physical Functioning:                 LLE AROM  L Knee Flexion: 100  L Knee Extension: 7          Bed Mobility  Supine to Sit: Supervision  Scooting: Independent    Transfers  Sit to Stand: Stand-by assistance  Stand to Sit: Stand-by assistance  Bed to Chair: Supervision    Balance  Sitting: Intact  Standing: With support              Weight Bearing Status  Left Side Weight Bearing: As tolerated  Distance (ft): 160 Feet (ft)(x 2)  Ambulation - Level of Assistance: Stand-by assistance  Assistive Device: Walker, rolling  Speed/Tamar: Delayed  Step Length: Left shortened;Right shortened  Stance: Left decreased  Gait Abnormalities: Antalgic  Interventions: Safety awareness training;Verbal cues     Braces/Orthotics:     Left Knee Cold  Type: Cryocuff      Body Structures Involved:  1. Bones  2. Joints  3. Muscles Body Functions Affected:  1. Neuromusculoskeletal  2. Movement Related Activities and Participation Affected:  1. General Tasks and Demands  2. Mobility  3. Self Care   Number of elements that affect the Plan of Care: 4+: HIGH COMPLEXITY   CLINICAL PRESENTATION:   Presentation: Stable and uncomplicated: LOW COMPLEXITY   CLINICAL DECISION MAKING:   [employer] 6 Clicks   Basic Mobility Inpatient Short Form  How much difficulty does the patient currently have. .. Unable A Lot A Little None   1. Turning over in bed (including adjusting bedclothes, sheets and blankets)? ? 1   ? 2   ? 3   ? 4   2. Sitting down on and standing up from a chair with arms ( e.g., wheelchair, bedside commode, etc.)   ? 1   ? 2   ? 3   ? 4   3.   Moving from lying on back to sitting on the side of the bed?   ? 1   ? 2   ? 3   ? 4   How much help from another person does the patient currently need. .. Total A Lot A Little None   4. Moving to and from a bed to a chair (including a wheelchair)? ? 1   ? 2   ? 3   ? 4   5. Need to walk in hospital room? ? 1   ? 2   ? 3   ? 4   6. Climbing 3-5 steps with a railing? ? 1   ? 2   ? 3   ? 4   © 2007, Trustees of [address] [place] [address] [address] [address], under license to [email or web address]. All rights reserved     Score:  Initial: 17 Most Recent: X (Date: -- )    Interpretation of Tool:  Represents activities that are increasingly more difficult (i.e. Bed mobility, Transfers, Gait). Medical Necessity:     · Patient demonstrates good  ·  rehab potential due to higher previous functional level. Reason for Services/Other Comments:  · Patient continues to require present interventions due to patient's inability to perform functional mobility at a safe supervision level   · . Use of outcome tool(s) and clinical judgement create a POC that gives a: Clear prediction of patient's progress: LOW COMPLEXITY            TREATMENT:   (In addition to Assessment/Re-Assessment sessions the following treatments were rendered)     Pre-treatment Symptoms/Complaints:  none   Pain: Initial:      Post Session:  Did not rate but no complaints of pain     Therapeutic Exercise: (30 Minutes):  Exercises per grid below to improve mobility, strength and coordination. Required minimal visual, verbal and tactile cues to promote proper body alignment and promote proper body breathing techniques. Progressed range, repetitions and complexity of movement as indicated. Gait Training (15 Minutes):  Gait training to improve and/or restore physical functioning as related to mobility, strength and balance.   Ambulated 160 Feet (ft)(x 2) with Stand-by assistance using a Walker, rolling and minimal Safety awareness training;Verbal cues related to their stance phase to promote proper body alignment, promote proper body posture and promote proper body mechanics. Date:  6/12/19 Date:  6/13 Date:     ACTIVITY/EXERCISE AM PM AM PM AM PM   GROUP THERAPY  ? ?  ?  x  ?  ? Ankle Pumps  10 10a 15a     Quad Sets  10 10a 15a     Gluteal Sets  10 10a 15a     Hip ABd/ADduction  10AA 10a 15a     Straight Leg Raises  10AA 10a 15a     Knee Slides  10AA 10aa 15a     Short Arc Quads  10  15a     Long Arc Quads         Chair Slides    15a              B = bilateral; AA = active assistive; A = active; P = passive      Treatment/Session Assessment:     Response to Treatment:  Pt. Doing well this pm.  Discussed more pain as block wears off    Education:  ? Home Exercises  ? Fall Precautions  ? Hip Precautions x D/C Instruction Review  x Knee/Hip Prosthesis Review  ? Walker Management/Safety ? Adaptive Equipment as Needed       Interdisciplinary Collaboration:   o Physical Therapy Assistant  o Rehabilitation Attendant    After treatment position/precautions:   o Up in chair  o Bed/Chair-wheels locked  o Bed in low position  o Call light within reach    Compliance with Program/Exercises: Will assess as treatment progresses. Recommendations/Intent for next treatment session:  Treatment next visit will focus on increasing Mr. Freitas's independence with bed mobility, transfers, gait training, strength/ROM exercises, modalities for pain, and patient education.       Total Treatment Duration:  PT Patient Time In/Time Out  Time In: 1300  Time Out: 5200 Armen Rios PT

## 2019-06-13 NOTE — PROGRESS NOTES
2019         Post Op day: 1 Day Post-OpProcedure(s) (LRB):  LEFT KNEE ARTHROPLASTY TOTAL / FNB / GELACIO (Left)      Admit Date: 2019  Admit Diagnosis: Primary osteoarthritis of left knee [M17.12]  Osteoarthritis [M19.90]  Osteoarthritis [M19.90]    LAB:    Recent Results (from the past 24 hour(s))   TYPE & SCREEN    Collection Time: 19  8:58 AM   Result Value Ref Range    Crossmatch Expiration 06/15/2019     ABO/Rh(D) A POSITIVE     Antibody screen NEG    GLUCOSE, POC    Collection Time: 19  9:07 AM   Result Value Ref Range    Glucose (POC) 136 (H) 65 - 100 mg/dL   HEMOGLOBIN    Collection Time: 19  7:37 PM   Result Value Ref Range    HGB 11.1 (L) 13.6 - 17.2 g/dL   HEMOGLOBIN    Collection Time: 19  4:33 AM   Result Value Ref Range    HGB 11.0 (L) 13.6 - 17.2 g/dL   HEMOGLOBIN A1C WITH EAG    Collection Time: 19  4:33 AM   Result Value Ref Range    Hemoglobin A1c 8.0 %    Est. average glucose 310 mg/dL   METABOLIC PANEL, BASIC    Collection Time: 19  4:33 AM   Result Value Ref Range    Sodium 139 136 - 145 mmol/L    Potassium 4.6 3.5 - 5.1 mmol/L    Chloride 106 98 - 107 mmol/L    CO2 22 21 - 32 mmol/L    Anion gap 11 7 - 16 mmol/L    Glucose 244 (H) 65 - 100 mg/dL    BUN 17 8 - 23 MG/DL    Creatinine 1.09 0.8 - 1.5 MG/DL    GFR est AA >60 >60 ml/min/1.73m2    GFR est non-AA >60 >60 ml/min/1.73m2    Calcium 8.2 (L) 8.3 - 10.4 MG/DL     Vital Signs:    Patient Vitals for the past 8 hrs:   BP Temp Pulse Resp SpO2   19 0250 142/69 97.4 °F (36.3 °C) 61 16 99 %   19 2332 119/64 97.5 °F (36.4 °C) 70 16 96 %     Temp (24hrs), Av.1 °F (36.2 °C), Min:95 °F (35 °C), Max:97.9 °F (36.6 °C)    Body mass index is 27.99 kg/m².   Pain Control:   Pain Assessment  Pain Scale 1: Numeric (0 - 10)  Pain Intensity 1: 3  Pain Onset 1: years ago  Pain Location 1: Knee  Pain Orientation 1: Left  Pain Description 1: Aching  Pain Intervention(s) 1: Ambulation/Increased Activity, Cold pack, Elevation, Exercise, Repositioned    Subjective: Doing well, No complaints, No SOB, No Chest Pain, No Nausea or Vomiting     Objective: Vital Signs are Stable, No Acute Distress, Alert and Oriented, Dressing is Dry,  Neurovascular exam is normal.       PT/OT:            Assistive Device: Walker (comment)        LLE AROM  L Knee Flexion: 60  L Knee Extension: 20       Weight Bearing Status: WBAT    Meds:  [unfilled]  [unfilled]  [unfilled]    Assessment:   Patient Active Problem List   Diagnosis Code    Osteoarthritis M19.90    Status post total knee replacement, left V84.348             Plan: Continue Physical Therapy, Monitor labs, Follow Cultures,  Anticipated D/C on 6/15 to [facility]        Signed By: Abeba Pardo NP

## 2019-06-13 NOTE — PROGRESS NOTES
Problem: Mobility Impaired (Adult and Pediatric)  Goal: *Acute Goals and Plan of Care (Insert Text)  Note:   GOALS (1-4 days):  (1.)Mr. Alexei España will move from supine to sit and sit to supine  in bed with CONTACT GUARD ASSIST.  (2.)Mr. Alexei España will transfer from bed to chair and chair to bed with SUPERVISION using the least restrictive device. (3.)Mr. Alexei España will ambulate with SUPERVISION for 150 feet with the least restrictive device. (4.)Mr. Alexei España will ambulate up/down 5 steps with bilateral  railing with CONTACT GUARD ASSIST with no device. (5.)Mr. Alexei España will increase left knee ROM to 5°-80°.  ________________________________________________________________________________________________      PHYSICAL THERAPY JOINT CAMP TKA: Daily Note and AM 6/13/2019  INPATIENT: Hospital Day: 2  Payor: [facility] MEDICARE / Plan: [facility] MEDICARE HMO/PPO / Product Type: Managed Care Medicare /      NAME/AGE/GENDER: Lexii Stoll is a 79 y.o. male   PRIMARY DIAGNOSIS:  Primary osteoarthritis of left knee [M17.12]   Procedure(s) and Anesthesia Type:     * LEFT KNEE ARTHROPLASTY TOTAL / FNB / GELACIO - Spinal (Left)  ICD-10: Treatment Diagnosis:    · Pain in Left Knee (M25.562)  · Stiffness of Left Knee, Not elsewhere classified (P12.118)  · Other abnormalities of gait and mobility (R26.89)      ASSESSMENT:     Mr. Alexei España presents S/P L TKA and demonstrates a decline in his premorbid level of function. He presents with decreased L LE strength and ROM, standing balance, functional mobility and TKA awareness. He would benefit from further PT while here to address these issues. Since he lives alone, he plans on trying to go to rehab at \[facility]". He is interested in [facility]. Pt. Doing well this am with no complaints. He ambulated well with walker this am and did tka exercises with cues only. Good rom. No questions.   This section established at most recent assessment   PROBLEM LIST (Impairments causing functional limitations):  1. Decreased Strength  2. Decreased Transfer Abilities  3. Decreased Ambulation Ability/Technique  4. Decreased Balance  5. Increased Pain  6. Decreased Activity Tolerance  7. Increased Fatigue  8. Decreased Flexibility/Joint Mobility  9. Decreased Knowledge of Precautions  10. Decreased [place] with Home Exercise Program   INTERVENTIONS PLANNED: (Benefits and precautions of physical therapy have been discussed with the patient.)  1. TKA education   2. bed mobility  3. gait training  4. home exercise program (HEP)  5. Range of Motion: active/assisted/passive  6. Therapeutic Activities  7. therapeutic exercise/strengthening  8. transfer training  9. Group Therapy     TREATMENT PLAN: Frequency/Duration: Follow patient BID for duration of hospital stay to address above goals. Rehabilitation Potential For Stated Goals: Good     RECOMMENDED REHABILITATION/EQUIPMENT: (at time of discharge pending progress): Continue Skilled Therapy and Home Health: Physical Therapy. But seems to be interested in rehab              HISTORY:   History of Present Injury/Illness (Reason for Referral):  S/P L TKA  Past Medical History/Comorbidities:   Mr. Lupe Rutherford  has a past medical history of Arthritis, BPH (benign prostatic hyperplasia), Diabetes ([address] GERD (gastroesophageal reflux disease), Hepatitis C, HTN (hypertension), Prostate cancer ([address] Sepsis ([facility] [address] (04/2018), and Status post total knee replacement, left (6/12/2019). Mr. Lupe Rutherford  has a past surgical history that includes hx other surgical (03/30/2018); hx orthopaedic (1972); hx knee arthroscopy (Left, 1994); hx hemorrhoidectomy (1973); hx colonoscopy (2016); and hx heart catheterization (2001).   Social History/Living Environment:   Home Environment: Private residence  # Steps to Enter: 1  Rails to Enter: No  One/Two Story Residence: One story  Living Alone: Yes  Support Systems: [employer] / [employer] community  Patient Expects to be Discharged to[de-identified] Rehabilitation facility  Current DME Used/Available at Home: Cane, straight, Commode, bedside, Crutches, Glucometer, Walker  Tub or Shower Type: Tub/Shower combination  Prior Level of Function/Work/Activity:  Functionally I with ADls and amb. Is a chen   Number of Personal Factors/Comorbidities that affect the Plan of Care: 1-2: MODERATE COMPLEXITY   EXAMINATION:   Most Recent Physical Functioning:                 LLE AROM  L Knee Flexion: 75  L Knee Extension: 10          Bed Mobility  Supine to Sit: Supervision  Scooting: Independent    Transfers  Sit to Stand: Stand-by assistance  Stand to Sit: Stand-by assistance  Bed to Chair: Supervision    Balance  Sitting: Intact  Standing: With support              Weight Bearing Status  Left Side Weight Bearing: As tolerated  Distance (ft): 150 Feet (ft)  Ambulation - Level of Assistance: Stand-by assistance  Assistive Device: Walker, rolling  Speed/Tamar: Delayed  Step Length: Left shortened;Right shortened  Stance: Left decreased  Gait Abnormalities: Antalgic  Interventions: Safety awareness training;Verbal cues     Braces/Orthotics:     Left Knee Cold  Type: Cryocuff      Body Structures Involved:  1. Bones  2. Joints  3. Muscles Body Functions Affected:  1. Neuromusculoskeletal  2. Movement Related Activities and Participation Affected:  1. General Tasks and Demands  2. Mobility  3. Self Care   Number of elements that affect the Plan of Care: 4+: HIGH COMPLEXITY   CLINICAL PRESENTATION:   Presentation: Stable and uncomplicated: LOW COMPLEXITY   CLINICAL DECISION MAKIN [facility] [address] [id number] AM-PAC 6 Clicks   Basic Mobility Inpatient Short Form  How much difficulty does the patient currently have. .. Unable A Lot A Little None   1. Turning over in bed (including adjusting bedclothes, sheets and blankets)? ? 1   ? 2   ? 3   ? 4   2. Sitting down on and standing up from a chair with arms ( e.g., wheelchair, bedside commode, etc.)   ? 1   ? 2   ? 3   ? 4   3.   Moving from lying on back to sitting on the side of the bed?   ? 1   ? 2   ? 3   ? 4   How much help from another person does the patient currently need. .. Total A Lot A Little None   4. Moving to and from a bed to a chair (including a wheelchair)? ? 1   ? 2   ? 3   ? 4   5. Need to walk in hospital room? ? 1   ? 2   ? 3   ? 4   6. Climbing 3-5 steps with a railing? ? 1   ? 2   ? 3   ? 4   © 2007, Trustees of [address] [place] [address] [address] [address], under license to [employer]. All rights reserved     Score:  Initial: 17 Most Recent: X (Date: -- )    Interpretation of Tool:  Represents activities that are increasingly more difficult (i.e. Bed mobility, Transfers, Gait). Medical Necessity:     · Patient demonstrates good  ·  rehab potential due to higher previous functional level. Reason for Services/Other Comments:  · Patient continues to require present interventions due to patient's inability to perform functional mobility at a safe supervision level   · . Use of outcome tool(s) and clinical judgement create a POC that gives a: Clear prediction of patient's progress: LOW COMPLEXITY            TREATMENT:   (In addition to Assessment/Re-Assessment sessions the following treatments were rendered)     Pre-treatment Symptoms/Complaints:  none   Pain: Initial:      Post Session:  No complaints     Therapeutic Exercise: (15 Minutes):  Exercises per grid below to improve mobility, strength and coordination. Required minimal visual, verbal and tactile cues to promote proper body alignment and promote proper body breathing techniques. Progressed range, repetitions and complexity of movement as indicated. Gait Training (10 Minutes):  Gait training to improve and/or restore physical functioning as related to mobility, strength and balance.   Ambulated 150 Feet (ft) with Stand-by assistance using a Walker, rolling and minimal Safety awareness training;Verbal cues related to their stance phase to promote proper body alignment, promote proper body posture and promote proper body mechanics. Date:  6/12/19 Date:  6/13 Date:     ACTIVITY/EXERCISE AM PM AM PM AM PM   GROUP THERAPY  ?  ?  ?  ?  ?  ? Ankle Pumps  10 10a      Quad Sets  10 10a      Gluteal Sets  10 10a      Hip ABd/ADduction  10AA 10a      Straight Leg Raises  10AA 10a      Knee Slides  10AA 10aa      Short Arc Quads  10       Long Arc Quads         Chair Slides                  B = bilateral; AA = active assistive; A = active; P = passive      Treatment/Session Assessment:     Response to Treatment:  Pt. Making progress    Education:  ? Home Exercises  ? Fall Precautions  ? Hip Precautions ? D/C Instruction Review  ? Knee/Hip Prosthesis Review  ? Walker Management/Safety ? Adaptive Equipment as Needed       Interdisciplinary Collaboration:   o Physical Therapist  o Registered Nurse    After treatment position/precautions:   o Up in chair  o Bed/Chair-wheels locked  o Bed in low position  o Call light within reach    Compliance with Program/Exercises: Will assess as treatment progresses. Recommendations/Intent for next treatment session:  Treatment next visit will focus on increasing Mr. Morrisseys independence with bed mobility, transfers, gait training, strength/ROM exercises, modalities for pain, and patient education.       Total Treatment Duration:  PT Patient Time In/Time Out  Time In: 0900  Time Out: Imer 116, PT

## 2019-06-13 NOTE — PROGRESS NOTES
Problem: Self Care Deficits Care Plan (Adult)  Goal: *Acute Goals and Plan of Care (Insert Text)  Description  GOALS:   DISCHARGE GOALS (in preparation for going home/rehab):  3 days  1. Mr. Mariluz Miller will perform one lower body dressing activity with minimal assistance required to demonstrate improved functional mobility and safety. -GOAL MET 6/13/2019   2. Mr. Mariluz Miller will perform one lower body bathing activity with minimal assistance required to demonstrate improved functional mobility and safety. -GOAL MET 6/13/2019   3. Mr. Mariluz Miller will perform toileting/toilet transfer with contact guard assistance to demonstrate improved functional mobility and safety. -GOAL MET 6/13/2019   4. Mr. Mariluz Miller will perform shower transfer with contact guard assistance to demonstrate improved functional mobility and safety. -GOAL MET 6/13/2019        JOINT CAMP OCCUPATIONAL THERAPY TKA: Daily Note and Discharge 6/13/2019  INPATIENT: Hospital Day: 2  Payor: [facility] MEDIC[facility] / Plan: [place] [facility] [place] H[place]/PPO / Product Type: Managed Care Medicare /      NAME/AGE/GENDER: Amber Bruner is a 79 y.o. male   PRIMARY DIAGNOSIS:  Primary osteoarthritis of left knee [M17.12]   Procedure(s) and Anesthesia Type:     * LEFT KNEE ARTHROPLASTY TOTAL / FNB / GELACIO - Spinal (Left)  ICD-10: Treatment Diagnosis:    · Pain in Left Knee (M25.562)  · Stiffness of Left Knee, Not elsewhere classified (P14.560)      ASSESSMENT:      Mr. Mariluz Miller is s/p left TKA and presents with decreased weight bearing on L LE and decreased independence with functional mobility and activities of daily living. Patient completed shower and dressing as charter below in ADL grid and is ambulating with rolling walker and supervision assist.  Patient has met 4/4 goals and plans to return home with good family support. Patient lives alone and is going to SNF at d/c. D/C OT for acute deficits.          This section established at most recent assessment PROBLEM LIST (Impairments causing functional limitations):  1. Decreased Strength  2. Decreased ADL/Functional Activities  3. Decreased Transfer Abilities  4. Increased Pain  5. Increased Fatigue  6. Decreased Flexibility/Joint Mobility  7. Decreased Knowledge of Precautions   INTERVENTIONS PLANNED: (Benefits and precautions of occupational therapy have been discussed with the patient.)  1. Activities of daily living training  2. Adaptive equipment training  3. Balance training  4. Clothing management  5. Donning&doffing training  6. Theraputic activity     TREATMENT PLAN: Frequency/Duration: Follow patient 1-2tx to address above goals. Rehabilitation Potential For Stated Goals: Excellent     RECOMMENDED REHABILITATION/EQUIPMENT: (at time of discharge pending progress): Continue Skilled Therapy. OCCUPATIONAL PROFILE AND HISTORY:   History of Present Injury/Illness (Reason for Referral): Pt presents this date s/p (left) TKA. Past Medical History/Comorbidities:   Mr. Jac Coronado  has a past medical history of Arthritis, BPH (benign prostatic hyperplasia), Diabetes ([facility] [address] GERD (gastroesophageal reflux disease), Hepatitis C, HTN (hypertension), Prostate cancer ([facility] [address] Sepsis ([facility] [address] (04/2018), and Status post total knee replacement, left (6/12/2019). Mr. Jac Coronado  has a past surgical history that includes hx other surgical (03/30/2018); hx orthopaedic (1972); hx knee arthroscopy (Left, 1994); hx hemorrhoidectomy (1973); hx colonoscopy (2016); and hx heart catheterization (2001).   Social History/Living Environment:   Home Environment: Private residence  # Steps to Enter: 1  Rails to Enter: No  One/Two Story Residence: One story  Living Alone: Yes  Support Systems: [employer] / [employer] community  Patient Expects to be Discharged to[de-identified] Rehabilitation facility  Current DME Used/Available at Home: Judene Chris, straight, Commode, bedside, Crutches, Glucometer, Walker  Tub or Shower Type: Tub/Shower combination  Prior Level of Function/Work/Activity:  Independent prior. Lives alone. Number of Personal Factors/Comorbidities that affect the Plan of Care: Brief history (0):  LOW COMPLEXITY   ASSESSMENT OF OCCUPATIONAL PERFORMANCE[de-identified]   Most Recent Physical Functioning:                        Coordination  Fine Motor Skills-Upper: Left Intact; Right Intact  Gross Motor Skills-Upper: Left Intact; Right Intact         Mental Status  Neurologic State: Alert  Orientation Level: Oriented X4  Cognition: Appropriate decision making  Perception: Appears intact                Basic ADLs (From Assessment) Complex ADLs (From Assessment)   Basic ADL  Feeding: Independent  Oral Facial Hygiene/Grooming: Independent  Bathing: Supervision  Type of Bath: Chlorhexidine (CHG), Full, Shower  Upper Body Dressing: Setup  Lower Body Dressing: Stand-by assistance  Toileting: Stand by assistance     Grooming/Bathing/Dressing Activities of Daily Living   Grooming  Grooming Assistance: Set-up     Upper Body Bathing  Bathing Assistance: Set-up     Lower Body Bathing  Bathing Assistance: Supervision     Upper Body Dressing Assistance  Dressing Assistance: Independent Functional Transfers  Bathroom Mobility: Supervision/set up  Toilet Transfer : Supervision  Shower Transfer: Supervision   Lower Body Dressing Assistance  Dressing Assistance: Stand-by assistance  Underpants: Stand-by assistance  Pants With Elastic Waist: Stand-by assistance  Socks: Stand-by assistance  Slip on Shoes with Back: Stand-by assistance Bed/Mat Mobility  Supine to Sit: Supervision  Sit to Stand: Supervision  Stand to Sit: Supervision  Bed to Chair: Supervision  Scooting: Independent         Physical Skills Involved:  1. Range of Motion  2. Balance  3. Strength Cognitive Skills Affected (resulting in the inability to perform in a timely and safe manner): 1. wfl  Psychosocial Skills Affected:  1.  WFL    Number of elements that affect the Plan of Care: 1-3:  LOW COMPLEXITY   CLINICAL DECISION MAKING:   [employer] AM-PAC 6 Clicks   Daily Activity Inpatient Short Form  How much help from another person does the patient currently need. .. Total A Lot A Little None   1. Putting on and taking off regular lower body clothing? ? 1    2   x? 3   ? 4   2. Bathing (including washing, rinsing, drying)? ? 1    2   x? 3   ? 4   3. Toileting, which includes using toilet, bedpan or urinal?   ? 1    2   x? 3   ? 4   4. Putting on and taking off regular upper body clothing? ? 1   ? 2   ? 3   ? 4   5. Taking care of personal grooming such as brushing teeth? ? 1   ? 2   ? 3   ? 4   6. Eating meals? ? 1   ? 2   ? 3   ? 4   © 2007, Trustees of [facility] [employer], under license to [employer]. All rights reserved     Score:  Initial: 18 Most Recent: 21 (Date: 6/13/2019 )    Interpretation of Tool:  Represents activities that are increasingly more difficult (i.e. Bed mobility, Transfers, Gait). Use of outcome tool(s) and clinical judgement create a POC that gives a: MODERATE COMPLEXITY            TREATMENT:   (In addition to Assessment/Re-Assessment sessions the following treatments were rendered)     Pre-treatment Symptoms/Complaints:    Pain: Initial:   Pain Intensity 1: 3  Post Session:  3     Self Care: (55): Procedure(s) (per grid) utilized to improve and/or restore self-care/home management as related to dressing, bathing, toileting and grooming. Required minimal verbal and tactile cueing to facilitate activities of daily living skills. Treatment/Session Assessment:     Response to Treatment:  Good, siting up in recliner. Education:  ? Home Exercises  ? Fall Precautions  ? Hip Precautions ? Going Home Video  ? Knee/Hip Prosthesis Review  ? Walker Management/Safety ?  Adaptive Equipment as Needed       Interdisciplinary Collaboration:   o Physical Therapist  o Occupational Therapist  o Registered Nurse    After treatment position/precautions:   o Up in chair  o Bed/Chair-wheels locked  o Caregiver at bedside  o Call light within reach  o RN notified     Compliance with Program/Exercises: Compliant all of the time, Will assess as treatment progresses.     Recommendations/Intent for next treatment session:  D/C OT     Total Treatment Duration:  OT Patient Time In/Time Out  Time In: 0725  Time Out: 1200 Hannah Craig, OT

## 2019-06-14 LAB
GLUCOSE BLD STRIP.AUTO-MCNC: 199 MG/DL (ref 65–100)
HGB BLD-MCNC: 9.8 G/DL (ref 13.6–17.2)
MM INDURATION POC: 0 MM (ref 0–5)
PPD POC: NEGATIVE NEGATIVE

## 2019-06-14 PROCEDURE — 74011250637 HC RX REV CODE- 250/637: Performed by: PHYSICIAN ASSISTANT

## 2019-06-14 PROCEDURE — 74011636637 HC RX REV CODE- 636/637: Performed by: PHYSICIAN ASSISTANT

## 2019-06-14 PROCEDURE — 85018 HEMOGLOBIN: CPT

## 2019-06-14 PROCEDURE — 74011250637 HC RX REV CODE- 250/637: Performed by: ORTHOPAEDIC SURGERY

## 2019-06-14 PROCEDURE — 82962 GLUCOSE BLOOD TEST: CPT

## 2019-06-14 PROCEDURE — 97116 GAIT TRAINING THERAPY: CPT

## 2019-06-14 PROCEDURE — 36415 COLL VENOUS BLD VENIPUNCTURE: CPT

## 2019-06-14 PROCEDURE — 65270000029 HC RM PRIVATE

## 2019-06-14 PROCEDURE — 97110 THERAPEUTIC EXERCISES: CPT

## 2019-06-14 RX ADMIN — Medication 1 AMPULE: at 08:20

## 2019-06-14 RX ADMIN — INSULIN GLARGINE 20 UNITS: 100 INJECTION, SOLUTION SUBCUTANEOUS at 21:00

## 2019-06-14 RX ADMIN — Medication 1 AMPULE: at 22:27

## 2019-06-14 RX ADMIN — ACETAMINOPHEN 1000 MG: 500 TABLET, FILM COATED ORAL at 15:55

## 2019-06-14 RX ADMIN — HYDROMORPHONE HYDROCHLORIDE 2 MG: 2 TABLET ORAL at 05:43

## 2019-06-14 RX ADMIN — Medication 10 ML: at 22:32

## 2019-06-14 RX ADMIN — CELECOXIB 200 MG: 200 CAPSULE ORAL at 22:28

## 2019-06-14 RX ADMIN — ASPIRIN 81 MG: 81 TABLET ORAL at 22:37

## 2019-06-14 RX ADMIN — SENNOSIDES AND DOCUSATE SODIUM 2 TABLET: 8.6; 5 TABLET ORAL at 08:20

## 2019-06-14 RX ADMIN — CELECOXIB 200 MG: 200 CAPSULE ORAL at 08:20

## 2019-06-14 RX ADMIN — TAMSULOSIN HYDROCHLORIDE 0.4 MG: 0.4 CAPSULE ORAL at 22:28

## 2019-06-14 RX ADMIN — CYANOCOBALAMIN TAB 1000 MCG 1000 MCG: 1000 TAB at 08:00

## 2019-06-14 RX ADMIN — LOSARTAN POTASSIUM 50 MG: 50 TABLET ORAL at 22:30

## 2019-06-14 RX ADMIN — HYDROMORPHONE HYDROCHLORIDE 2 MG: 2 TABLET ORAL at 22:33

## 2019-06-14 RX ADMIN — Medication 5 ML: at 15:55

## 2019-06-14 RX ADMIN — METFORMIN HYDROCHLORIDE 500 MG: 500 TABLET, FILM COATED ORAL at 21:00

## 2019-06-14 RX ADMIN — ASPIRIN 81 MG: 81 TABLET ORAL at 08:20

## 2019-06-14 RX ADMIN — ACETAMINOPHEN 1000 MG: 500 TABLET, FILM COATED ORAL at 05:43

## 2019-06-14 RX ADMIN — FAMOTIDINE 10 MG: 20 TABLET, FILM COATED ORAL at 08:00

## 2019-06-14 NOTE — PROGRESS NOTES
Shift assessment completed. Pt is alert & oriented x4. Able to verbalize needs. Resting quietly with no distress noted. Dressing to left knee is clean, dry and intact. Thana Harding in place. Neurovascular and peripheral vascular checks WNL. Incentive Spirometry at bedside. Patient encouraged to use hourly x 10 repetitions. Patient voiding clear yellow urine without difficulty. Pain is being managed with Dilaudid with patient tolerating well. Bed low and locked. Call light within reach. Patient instructed to call for assistance. Pt verbalizes understanding. Will monitor.

## 2019-06-14 NOTE — PROGRESS NOTES
Problem: Mobility Impaired (Adult and Pediatric)  Goal: *Acute Goals and Plan of Care (Insert Text)  Note:   GOALS (1-4 days):  (1.)Mr. Kody Desouza will move from supine to sit and sit to supine  in bed with CONTACT GUARD ASSIST.  (2.)Mr. Kody Desouza will transfer from bed to chair and chair to bed with SUPERVISION using the least restrictive device. (3.)Mr. Kody Desouza will ambulate with SUPERVISION for 150 feet with the least restrictive device. (4.)Mr. Kody Desouza will ambulate up/down 5 steps with bilateral  railing with CONTACT GUARD ASSIST with no device. (5.)Mr. Kody Desouza will increase left knee ROM to 5°-80°.  ________________________________________________________________________________________________      PHYSICAL THERAPY JOINT CAMP TKA: Daily Note and PM 6/14/2019  INPATIENT: Hospital Day: 3  Payor: [facility] MEDICARE / Plan: [facility] MEDICARE HMO/PPO / Product Type: Managed Care Medicare /      NAME/AGE/GENDER: Leticia Lowry is a 79 y.o. male   PRIMARY DIAGNOSIS:  Primary osteoarthritis of left knee [M17.12]   Procedure(s) and Anesthesia Type:     * LEFT KNEE ARTHROPLASTY TOTAL / FNB / GELACIO - Spinal (Left)  ICD-10: Treatment Diagnosis:    · Pain in Left Knee (M25.562)  · Stiffness of Left Knee, Not elsewhere classified (Q21.600)  · Other abnormalities of gait and mobility (R26.89)      ASSESSMENT:     Mr. Kody Desouza presents S/P L TKA and demonstrates a decline in his premorbid level of function. He presents with decreased L LE strength and ROM, standing balance, functional mobility and TKA awareness. He would benefit from further PT while here to address these issues. Since he lives alone, he plans on trying to go to rehab at [facility]. Pt. Reports doing well, just taking a nap. He increased his gait distance with walker this pm, step through gait. He did tka exercises with cues only. No questions.   This section established at most recent assessment   PROBLEM LIST (Impairments causing functional limitations):  1. Decreased Strength  2. Decreased Transfer Abilities  3. Decreased Ambulation Ability/Technique  4. Decreased Balance  5. Increased Pain  6. Decreased Activity Tolerance  7. Increased Fatigue  8. Decreased Flexibility/Joint Mobility  9. Decreased Knowledge of Precautions  10. Decreased [place] with Home Exercise Program   INTERVENTIONS PLANNED: (Benefits and precautions of physical therapy have been discussed with the patient.)  1. TKA education   2. bed mobility  3. gait training  4. home exercise program (HEP)  5. Range of Motion: active/assisted/passive  6. Therapeutic Activities  7. therapeutic exercise/strengthening  8. transfer training  9. Group Therapy     TREATMENT PLAN: Frequency/Duration: Follow patient BID for duration of hospital stay to address above goals. Rehabilitation Potential For Stated Goals: Good     RECOMMENDED REHABILITATION/EQUIPMENT: (at time of discharge pending progress): Continue Skilled Therapy and Home Health: Physical Therapy. But seems to be interested in rehab              HISTORY:   History of Present Injury/Illness (Reason for Referral):  S/P L TKA  Past Medical History/Comorbidities:   Mr. Charlene Arzate  has a past medical history of Arthritis, BPH (benign prostatic hyperplasia), Diabetes ([address] GERD (gastroesophageal reflux disease), Hepatitis C, HTN (hypertension), Prostate cancer ([address] Sepsis ([address] (04/2018), and Status post total knee replacement, left (6/12/2019). Mr. Charlene Arzate  has a past surgical history that includes hx other surgical (03/30/2018); hx orthopaedic (1972); hx knee arthroscopy (Left, 1994); hx hemorrhoidectomy (1973); hx colonoscopy (2016); and hx heart catheterization (2001).   Social History/Living Environment:   Home Environment: Private residence  # Steps to Enter: 1  Rails to Enter: No  One/Two Story Residence: One story  Living Alone: Yes  Support Systems: [employer] / [employer] community  Patient Expects to be Discharged to[de-identified] Rehabilitation facility  Current DME Used/Available at Home: Cane, straight, Commode, bedside, Crutches, Glucometer, Walker  Tub or Shower Type: Tub/Shower combination  Prior Level of Function/Work/Activity:  Functionally I with ADls and amb. Is a chen   Number of Personal Factors/Comorbidities that affect the Plan of Care: 1-2: MODERATE COMPLEXITY   EXAMINATION:   Most Recent Physical Functioning:                 LLE AROM  L Knee Flexion: 93  L Knee Extension: 4               Transfers  Sit to Stand: Stand-by assistance  Stand to Sit: Stand-by assistance    Balance  Sitting: Intact  Standing: With support              Weight Bearing Status  Left Side Weight Bearing: As tolerated  Distance (ft): 320 Feet (ft)  Ambulation - Level of Assistance: Stand-by assistance  Assistive Device: Walker, rolling  Speed/Tamar: Delayed  Step Length: Left shortened;Right shortened  Stance: Left decreased  Gait Abnormalities: Antalgic  Interventions: Safety awareness training;Verbal cues     Braces/Orthotics:     Left Knee Cold  Type: Cryocuff      Body Structures Involved:  1. Bones  2. Joints  3. Muscles Body Functions Affected:  1. Neuromusculoskeletal  2. Movement Related Activities and Participation Affected:  1. General Tasks and Demands  2. Mobility  3. Self Care   Number of elements that affect the Plan of Care: 4+: HIGH COMPLEXITY   CLINICAL PRESENTATION:   Presentation: Stable and uncomplicated: LOW COMPLEXITY   CLINICAL DECISION MAKIN [facility] [address] [id number] AM-PAC 6 Clicks   Basic Mobility Inpatient Short Form  How much difficulty does the patient currently have. .. Unable A Lot A Little None   1. Turning over in bed (including adjusting bedclothes, sheets and blankets)? ? 1   ? 2   ? 3   ? 4   2. Sitting down on and standing up from a chair with arms ( e.g., wheelchair, bedside commode, etc.)   ? 1   ? 2   ? 3   ? 4   3.   Moving from lying on back to sitting on the side of the bed?   ? 1   ? 2   ? 3   ? 4   How much help from another person does the patient currently need. .. Total A Lot A Little None   4. Moving to and from a bed to a chair (including a wheelchair)? ? 1   ? 2   ? 3   ? 4   5. Need to walk in hospital room? ? 1   ? 2   ? 3   ? 4   6. Climbing 3-5 steps with a railing? ? 1   ? 2   ? 3   ? 4   © 2007, Trustees of [employer], under license to [employer]. All rights reserved     Score:  Initial: 17 Most Recent: X (Date: -- )    Interpretation of Tool:  Represents activities that are increasingly more difficult (i.e. Bed mobility, Transfers, Gait). Medical Necessity:     · Patient demonstrates good  ·  rehab potential due to higher previous functional level. Reason for Services/Other Comments:  · Patient continues to require present interventions due to patient's inability to perform functional mobility at a safe supervision level   · . Use of outcome tool(s) and clinical judgement create a POC that gives a: Clear prediction of patient's progress: LOW COMPLEXITY            TREATMENT:   (In addition to Assessment/Re-Assessment sessions the following treatments were rendered)     Pre-treatment Symptoms/Complaints:  Knee pain  Pain: Initial:      Post Session:  No complaints at rest     Therapeutic Exercise: (20 Minutes):  Exercises per grid below to improve mobility, strength and coordination. Required minimal visual, verbal and tactile cues to promote proper body alignment and promote proper body breathing techniques. Progressed range, repetitions and complexity of movement as indicated. Gait Training (10 Minutes):  Gait training to improve and/or restore physical functioning as related to mobility, strength and balance. Ambulated 320 Feet (ft) with Stand-by assistance using a Walker, rolling and minimal Safety awareness training;Verbal cues related to their stance phase to promote proper body alignment, promote proper body posture and promote proper body mechanics.       Date:  6/12/19 Date:  6/13 Date:  6/14   ACTIVITY/EXERCISE AM PM AM PM AM PM   GROUP THERAPY  ? ?  ?  x  ?  ? Ankle Pumps  10 10a 15a 20a 20a   Quad Sets  10 10a 15a 20a 20a   Gluteal Sets  10 10a 15a 20a 20a   Hip ABd/ADduction  10AA 10a 15a 20a 20a   Straight Leg Raises  10AA 10a 15a 20a 20a   Knee Slides  10AA 10aa 15a 20a 20a   Short Arc Quads  10  15a 20a 20a   Long Arc Quads         Chair Slides    15a 20a 20a            B = bilateral; AA = active assistive; A = active; P = passive      Treatment/Session Assessment:     Response to Treatment:  Pt. Making progress. Discussed importance of swelling control. Education:  ? Home Exercises  ? Fall Precautions  ? Hip Precautions x D/C Instruction Review  x Knee/Hip Prosthesis Review  ? Walker Management/Safety ? Adaptive Equipment as Needed       Interdisciplinary Collaboration:   o Registered Nurse    After treatment position/precautions:   o Up in chair  o Bed/Chair-wheels locked  o Bed in low position  o Call light within reach    Compliance with Program/Exercises: Will assess as treatment progresses. No questions. Recommendations/Intent for next treatment session:  Treatment next visit will focus on increasing Mr. Morrisseys independence with bed mobility, transfers, gait training, strength/ROM exercises, modalities for pain, and patient education.       Total Treatment Duration:  PT Patient Time In/Time Out  Time In: 4053  Time Out: ABRAHAM Nguyen

## 2019-06-14 NOTE — PROGRESS NOTES
2019         Post Op day: 2 Days Post-OpProcedure(s) (LRB):  LEFT KNEE ARTHROPLASTY TOTAL / FNB / GELACIO (Left)      Admit Date: 2019  Admit Diagnosis: Primary osteoarthritis of left knee [M17.12]  Osteoarthritis [M19.90]  Osteoarthritis [M19.90]    LAB:    Recent Results (from the past 24 hour(s))   PLEASE READ & DOCUMENT PPD TEST IN 24 HRS    Collection Time: 19  8:55 AM   Result Value Ref Range    PPD Negative Negative    mm Induration 0 0 - 5 mm   HEMOGLOBIN    Collection Time: 19  4:32 AM   Result Value Ref Range    HGB 9.8 (L) 13.6 - 17.2 g/dL     Vital Signs:    Patient Vitals for the past 8 hrs:   BP Temp Pulse Resp SpO2   19 0300 131/74 97.5 °F (36.4 °C) 62 18 99 %   19 0002 141/74 96.8 °F (36 °C) (!) 51 18 100 %     Temp (24hrs), Av.5 °F (36.4 °C), Min:96.8 °F (36 °C), Max:97.7 °F (36.5 °C)    Body mass index is 27.99 kg/m².   Pain Control:   Pain Assessment  Pain Scale 1: Numeric (0 - 10)  Pain Intensity 1: 5  Pain Onset 1: years ago  Pain Location 1: Knee  Pain Orientation 1: Left  Pain Description 1: Constant  Pain Intervention(s) 1: Medication (see MAR)    Subjective: Doing well, No complaints, No SOB, No Chest Pain, No Nausea or Vomiting     Objective: Vital Signs are Stable, No Acute Distress, Alert and Oriented, Dressing is Dry,  Neurovascular exam is normal.       PT/OT:            Assistive Device: Walker (comment)        LLE AROM  L Knee Flexion: 100  L Knee Extension: 7       Weight Bearing Status: WBAT    Meds:  [unfilled]  [unfilled]  [unfilled]    Assessment:   Patient Active Problem List   Diagnosis Code    Osteoarthritis M19.90    Status post total knee replacement, left W04.615             Plan: Continue Physical Therapy, Monitor labs, Follow Cultures - Negative to Date, D/C to [employer] 6/15        Signed By: Angel Lancaster NP

## 2019-06-14 NOTE — PROGRESS NOTES
Problem: Mobility Impaired (Adult and Pediatric)  Goal: *Acute Goals and Plan of Care (Insert Text)  Note:   GOALS (1-4 days):  (1.)Mr. Rojelio Gibbs will move from supine to sit and sit to supine  in bed with CONTACT GUARD ASSIST.  (2.)Mr. Rojelio Gibbs will transfer from bed to chair and chair to bed with SUPERVISION using the least restrictive device. (3.)Mr. Rojelio Gibbs will ambulate with SUPERVISION for 150 feet with the least restrictive device. (4.)Mr. Rojelio Gibbs will ambulate up/down 5 steps with bilateral  railing with CONTACT GUARD ASSIST with no device. (5.)Mr. Rojelio Gibbs will increase left knee ROM to 5°-80°.  ________________________________________________________________________________________________      PHYSICAL THERAPY JOINT CAMP TKA: Daily Note and AM 6/14/2019  INPATIENT: Hospital Day: 3  Payor: [facility] MEDIC[facility] / Plan: [facility] MEDICARE HMO/PPO / Product Type: Managed Care Medicare /      NAME/AGE/GENDER: Tracey Nix is a 79 y.o. male   PRIMARY DIAGNOSIS:  Primary osteoarthritis of left knee [M17.12]   Procedure(s) and Anesthesia Type:     * LEFT KNEE ARTHROPLASTY TOTAL / FNB / Morales Anchors - Spinal (Left)  ICD-10: Treatment Diagnosis:    · Pain in Left Knee (M25.562)  · Stiffness of Left Knee, Not elsewhere classified (L76.108)  · Other abnormalities of gait and mobility (R26.89)      ASSESSMENT:     Mr. Rojelio Gibbs presents S/P L TKA and demonstrates a decline in his premorbid level of function. He presents with decreased L LE strength and ROM, standing balance, functional mobility and TKA awareness. He would benefit from further PT while here to address these issues. Since he lives alone, he plans on trying to go to rehab at [facility]. Pt. Reports doing well this am.  He increased his gait distance with walker with step through gait pattern. he did tka exercises with cues. Good rom. No questions.   This section established at most recent assessment   PROBLEM LIST (Impairments causing functional limitations):  1. Decreased Strength  2. Decreased Transfer Abilities  3. Decreased Ambulation Ability/Technique  4. Decreased Balance  5. Increased Pain  6. Decreased Activity Tolerance  7. Increased Fatigue  8. Decreased Flexibility/Joint Mobility  9. Decreased Knowledge of Precautions  10. Decreased [place] with Home Exercise Program   INTERVENTIONS PLANNED: (Benefits and precautions of physical therapy have been discussed with the patient.)  1. TKA education   2. bed mobility  3. gait training  4. home exercise program (HEP)  5. Range of Motion: active/assisted/passive  6. Therapeutic Activities  7. therapeutic exercise/strengthening  8. transfer training  9. Group Therapy     TREATMENT PLAN: Frequency/Duration: Follow patient BID for duration of hospital stay to address above goals. Rehabilitation Potential For Stated Goals: Good     RECOMMENDED REHABILITATION/EQUIPMENT: (at time of discharge pending progress): Continue Skilled Therapy and Home Health: Physical Therapy. But seems to be interested in rehab              HISTORY:   History of Present Injury/Illness (Reason for Referral):  S/P L TKA  Past Medical History/Comorbidities:   Mr. Reji Plata  has a past medical history of Arthritis, BPH (benign prostatic hyperplasia), Diabetes ([address] GERD (gastroesophageal reflux disease), Hepatitis C, HTN (hypertension), Prostate cancer ([address] Sepsis ([facility] [address] (04/2018), and Status post total knee replacement, left (6/12/2019). Mr. Reji Plata  has a past surgical history that includes hx other surgical (03/30/2018); hx orthopaedic (1972); hx knee arthroscopy (Left, 1994); hx hemorrhoidectomy (1973); hx colonoscopy (2016); and hx heart catheterization (2001).   Social History/Living Environment:   Home Environment: Private residence  # Steps to Enter: 1  Rails to Enter: No  One/Two Story Residence: One story  Living Alone: Yes  Support Systems: [employer] / [employer] community  Patient Expects to be Discharged to[de-identified] Rehabilitation facility  Current DME Used/Available at Home: Cane, straight, Commode, bedside, Crutches, Glucometer, Walker  Tub or Shower Type: Tub/Shower combination  Prior Level of Function/Work/Activity:  Functionally I with ADls and amb. Is a chen   Number of Personal Factors/Comorbidities that affect the Plan of Care: 1-2: MODERATE COMPLEXITY   EXAMINATION:   Most Recent Physical Functioning:                 LLE AROM  L Knee Flexion: 93  L Knee Extension: 4               Transfers  Sit to Stand: Stand-by assistance  Stand to Sit: Stand-by assistance    Balance  Sitting: Intact  Standing: With support              Weight Bearing Status  Left Side Weight Bearing: As tolerated  Distance (ft): 275 Feet (ft)  Ambulation - Level of Assistance: Stand-by assistance  Assistive Device: Walker, rolling  Speed/Tamar: Delayed  Step Length: Right shortened;Left shortened  Stance: Left decreased  Gait Abnormalities: Antalgic  Interventions: Safety awareness training;Verbal cues     Braces/Orthotics:     Left Knee Cold  Type: Cryocuff      Body Structures Involved:  1. Bones  2. Joints  3. Muscles Body Functions Affected:  1. Neuromusculoskeletal  2. Movement Related Activities and Participation Affected:  1. General Tasks and Demands  2. Mobility  3. Self Care   Number of elements that affect the Plan of Care: 4+: HIGH COMPLEXITY   CLINICAL PRESENTATION:   Presentation: Stable and uncomplicated: LOW COMPLEXITY   CLINICAL DECISION MAKIN [facility] [address] [id number] AM-PAC 6 Clicks   Basic Mobility Inpatient Short Form  How much difficulty does the patient currently have. .. Unable A Lot A Little None   1. Turning over in bed (including adjusting bedclothes, sheets and blankets)? ? 1   ? 2   ? 3   ? 4   2. Sitting down on and standing up from a chair with arms ( e.g., wheelchair, bedside commode, etc.)   ? 1   ? 2   ? 3   ? 4   3.   Moving from lying on back to sitting on the side of the bed?   ? 1   ? 2   ? 3   ? 4   How much help from another person does the patient currently need. .. Total A Lot A Little None   4. Moving to and from a bed to a chair (including a wheelchair)? ? 1   ? 2   ? 3   ? 4   5. Need to walk in hospital room? ? 1   ? 2   ? 3   ? 4   6. Climbing 3-5 steps with a railing? ? 1   ? 2   ? 3   ? 4   © 2007, Trustees of [address] [place] [address] [address] [address], under license to [employer]. All rights reserved     Score:  Initial: 17 Most Recent: X (Date: -- )    Interpretation of Tool:  Represents activities that are increasingly more difficult (i.e. Bed mobility, Transfers, Gait). Medical Necessity:     · Patient demonstrates good  ·  rehab potential due to higher previous functional level. Reason for Services/Other Comments:  · Patient continues to require present interventions due to patient's inability to perform functional mobility at a safe supervision level   · . Use of outcome tool(s) and clinical judgement create a POC that gives a: Clear prediction of patient's progress: LOW COMPLEXITY            TREATMENT:   (In addition to Assessment/Re-Assessment sessions the following treatments were rendered)     Pre-treatment Symptoms/Complaints:  Knee pain  Pain: Initial:      Post Session:  5     Therapeutic Exercise: (20 Minutes):  Exercises per grid below to improve mobility, strength and coordination. Required minimal visual, verbal and tactile cues to promote proper body alignment and promote proper body breathing techniques. Progressed range, repetitions and complexity of movement as indicated. Gait Training (10 Minutes):  Gait training to improve and/or restore physical functioning as related to mobility, strength and balance. Ambulated 275 Feet (ft) with Stand-by assistance using a Walker, rolling and minimal Safety awareness training;Verbal cues related to their stance phase to promote proper body alignment, promote proper body posture and promote proper body mechanics.       Date:  6/12/19 Date:  6/13 Date:  6/14 ACTIVITY/EXERCISE AM PM AM PM AM PM   GROUP THERAPY  ? ?  ?  x  ?  ? Ankle Pumps  10 10a 15a 20a    Quad Sets  10 10a 15a 20a    Gluteal Sets  10 10a 15a 20a    Hip ABd/ADduction  10AA 10a 15a 20a    Straight Leg Raises  10AA 10a 15a 20a    Knee Slides  10AA 10aa 15a 20a    Short Arc Quads  10  15a 20a    Long Arc Quads         Chair Slides    15a 20a             B = bilateral; AA = active assistive; A = active; P = passive      Treatment/Session Assessment:     Response to Treatment:  Pt. Doing well    Education:  ? Home Exercises  ? Fall Precautions  ? Hip Precautions x D/C Instruction Review  x Knee/Hip Prosthesis Review  ? Walker Management/Safety ? Adaptive Equipment as Needed       Interdisciplinary Collaboration:   o Certified Nursing Assistant/Patient Care Technician    After treatment position/precautions:   o Up in chair  o Bed/Chair-wheels locked  o Bed in low position  o Call light within reach    Compliance with Program/Exercises: Will assess as treatment progresses. Recommendations/Intent for next treatment session:  Treatment next visit will focus on increasing Mr. Morrisseys independence with bed mobility, transfers, gait training, strength/ROM exercises, modalities for pain, and patient education.       Total Treatment Duration:  PT Patient Time In/Time Out  Time In: 0900  Time Out: [address], PT

## 2019-06-14 NOTE — PROGRESS NOTES
End of shift note:     All hourly rounds were completed on pt throughout shift. Pt was cooperative and med compliant. Pt was able to dorsi and plantar flex bilaterally and dressing was C/D/I. All pt needs are met at this time. Will continue to monitor pt throughout shift and give report to oncoming day shift nurse.

## 2019-06-15 LAB
BACTERIA SPEC CULT: NORMAL
GLUCOSE BLD STRIP.AUTO-MCNC: 153 MG/DL (ref 65–100)
GLUCOSE BLD STRIP.AUTO-MCNC: 231 MG/DL (ref 65–100)
GLUCOSE BLD STRIP.AUTO-MCNC: 295 MG/DL (ref 65–100)
GRAM STN SPEC: NORMAL
GRAM STN SPEC: NORMAL
HGB BLD-MCNC: 10.2 G/DL (ref 13.6–17.2)
SERVICE CMNT-IMP: NORMAL

## 2019-06-15 PROCEDURE — 36415 COLL VENOUS BLD VENIPUNCTURE: CPT

## 2019-06-15 PROCEDURE — 74011250637 HC RX REV CODE- 250/637: Performed by: ORTHOPAEDIC SURGERY

## 2019-06-15 PROCEDURE — 97116 GAIT TRAINING THERAPY: CPT

## 2019-06-15 PROCEDURE — 74011250637 HC RX REV CODE- 250/637: Performed by: PHYSICIAN ASSISTANT

## 2019-06-15 PROCEDURE — 85018 HEMOGLOBIN: CPT

## 2019-06-15 PROCEDURE — 65270000029 HC RM PRIVATE

## 2019-06-15 PROCEDURE — 97150 GROUP THERAPEUTIC PROCEDURES: CPT

## 2019-06-15 PROCEDURE — 82962 GLUCOSE BLOOD TEST: CPT

## 2019-06-15 PROCEDURE — 97110 THERAPEUTIC EXERCISES: CPT

## 2019-06-15 PROCEDURE — 74011636637 HC RX REV CODE- 636/637: Performed by: PHYSICIAN ASSISTANT

## 2019-06-15 RX ADMIN — CELECOXIB 200 MG: 200 CAPSULE ORAL at 20:11

## 2019-06-15 RX ADMIN — ASPIRIN 81 MG: 81 TABLET ORAL at 20:11

## 2019-06-15 RX ADMIN — HYDROMORPHONE HYDROCHLORIDE 2 MG: 2 TABLET ORAL at 02:47

## 2019-06-15 RX ADMIN — Medication 5 ML: at 06:55

## 2019-06-15 RX ADMIN — ACETAMINOPHEN 1000 MG: 500 TABLET, FILM COATED ORAL at 08:27

## 2019-06-15 RX ADMIN — ACETAMINOPHEN 1000 MG: 500 TABLET, FILM COATED ORAL at 14:00

## 2019-06-15 RX ADMIN — Medication 1 AMPULE: at 08:28

## 2019-06-15 RX ADMIN — LOSARTAN POTASSIUM 50 MG: 50 TABLET ORAL at 22:00

## 2019-06-15 RX ADMIN — INSULIN GLARGINE 20 UNITS: 100 INJECTION, SOLUTION SUBCUTANEOUS at 21:00

## 2019-06-15 RX ADMIN — SENNOSIDES AND DOCUSATE SODIUM 2 TABLET: 8.6; 5 TABLET ORAL at 08:28

## 2019-06-15 RX ADMIN — ACETAMINOPHEN 1000 MG: 500 TABLET, FILM COATED ORAL at 01:43

## 2019-06-15 RX ADMIN — ASPIRIN 81 MG: 81 TABLET ORAL at 08:27

## 2019-06-15 RX ADMIN — HYDROMORPHONE HYDROCHLORIDE 2 MG: 2 TABLET ORAL at 15:43

## 2019-06-15 RX ADMIN — TAMSULOSIN HYDROCHLORIDE 0.4 MG: 0.4 CAPSULE ORAL at 22:00

## 2019-06-15 RX ADMIN — HYDROMORPHONE HYDROCHLORIDE 2 MG: 2 TABLET ORAL at 06:54

## 2019-06-15 RX ADMIN — ACETAMINOPHEN 1000 MG: 500 TABLET, FILM COATED ORAL at 20:11

## 2019-06-15 RX ADMIN — METFORMIN HYDROCHLORIDE 500 MG: 500 TABLET, FILM COATED ORAL at 17:00

## 2019-06-15 RX ADMIN — Medication 5 ML: at 22:00

## 2019-06-15 RX ADMIN — HYDROMORPHONE HYDROCHLORIDE 2 MG: 2 TABLET ORAL at 11:59

## 2019-06-15 RX ADMIN — HYDROMORPHONE HYDROCHLORIDE 2 MG: 2 TABLET ORAL at 20:10

## 2019-06-15 RX ADMIN — CELECOXIB 200 MG: 200 CAPSULE ORAL at 08:28

## 2019-06-15 RX ADMIN — Medication 1 AMPULE: at 20:10

## 2019-06-15 NOTE — PROGRESS NOTES
Problem: Mobility Impaired (Adult and Pediatric)  Goal: *Acute Goals and Plan of Care (Insert Text)  Note:   GOALS (1-4 days):  (1.)Mr. Rojelio Gibbs will move from supine to sit and sit to supine  in bed with CONTACT GUARD ASSIST.  (2.)Mr. Rojelio Gibbs will transfer from bed to chair and chair to bed with SUPERVISION using the least restrictive device. (3.)Mr. Rojelio Gibbs will ambulate with SUPERVISION for 150 feet with the least restrictive device. Met 6/15  (4.)Mr. Rojelio Gibbs will ambulate up/down 5 steps with bilateral  railing with CONTACT GUARD ASSIST with no device. (5.)Mr. Rojelio Gibbs will increase left knee ROM to 5°-80°. - met 6/15  ________________________________________________________________________________________________      PHYSICAL THERAPY JOINT CAMP TKA: Daily Note and AM 6/15/2019  INPATIENT: Hospital Day: 4  Payor: [facility] MEDICARE / Plan: [facility] [place]/PP[place] / Product Type: Managed Care Medicare /      NAME/AGE/GENDER: Tracey Nix is a 79 y.o. male   PRIMARY DIAGNOSIS:  Primary osteoarthritis of left knee [M17.12]   Procedure(s) and Anesthesia Type:     * LEFT KNEE ARTHROPLASTY TOTAL / FNB / Morales Anchors - Spinal (Left)  ICD-10: Treatment Diagnosis:    · Pain in Left Knee (M25.562)  · Stiffness of Left Knee, Not elsewhere classified (M07.213)  · Other abnormalities of gait and mobility (R26.89)      ASSESSMENT:     Mr. Rojelio Gibbs presents S/P L TKA and demonstrates a decline in his premorbid level of function. He presents with decreased L LE strength and ROM, standing balance, functional mobility and TKA awareness. He would benefit from further PT while here to address these issues. Since he lives alone, he plans on trying to go to rehab at [facility]. Pt with excellent tolerance this AM. Supervision with transfers and gait training. Requires some cues for gait form but otherwise doing well. Improved mobility this morning.     This section established at most recent assessment   PROBLEM LIST (Impairments causing functional limitations):  1. Decreased Strength  2. Decreased Transfer Abilities  3. Decreased Ambulation Ability/Technique  4. Decreased Balance  5. Increased Pain  6. Decreased Activity Tolerance  7. Increased Fatigue  8. Decreased Flexibility/Joint Mobility  9. Decreased Knowledge of Precautions  10. Decreased [place] with Home Exercise Program   INTERVENTIONS PLANNED: (Benefits and precautions of physical therapy have been discussed with the patient.)  1. TKA education   2. bed mobility  3. gait training  4. home exercise program (HEP)  5. Range of Motion: active/assisted/passive  6. Therapeutic Activities  7. therapeutic exercise/strengthening  8. transfer training  9. Group Therapy     TREATMENT PLAN: Frequency/Duration: Follow patient BID for duration of hospital stay to address above goals. Rehabilitation Potential For Stated Goals: Good     RECOMMENDED REHABILITATION/EQUIPMENT: (at time of discharge pending progress): Continue Skilled Therapy and Home Health: Physical Therapy. But seems to be interested in rehab              HISTORY:   History of Present Injury/Illness (Reason for Referral):  S/P L TKA  Past Medical History/Comorbidities:   Mr. Lupe Rutherford  has a past medical history of Arthritis, BPH (benign prostatic hyperplasia), Diabetes ([address] GERD (gastroesophageal reflux disease), Hepatitis C, HTN (hypertension), Prostate cancer ([address] Sepsis ([address] (04/2018), and Status post total knee replacement, left (6/12/2019). Mr. Lupe Rutherford  has a past surgical history that includes hx other surgical (03/30/2018); hx orthopaedic (1972); hx knee arthroscopy (Left, 1994); hx hemorrhoidectomy (1973); hx colonoscopy (2016); and hx heart catheterization (2001).   Social History/Living Environment:   Home Environment: Private residence  # Steps to Enter: 1  Rails to Enter: No  One/Two Story Residence: One story  Living Alone: Yes  Support Systems: Worship / [employer] community  Patient Expects to be Discharged to[de-identified] Rehabilitation facility  Current DME Used/Available at Home: Cane, straight, Commode, bedside, Crutches, Glucometer, Walker  Tub or Shower Type: Tub/Shower combination  Prior Level of Function/Work/Activity:  Functionally I with ADls and amb. Is a chen   Number of Personal Factors/Comorbidities that affect the Plan of Care: 1-2: MODERATE COMPLEXITY   EXAMINATION:   Most Recent Physical Functioning:      Gross Assessment  AROM: Within functional limits(B UEs and R LE)  Strength: Within functional limits(B UEs and R LE)          LLE AROM  L Knee Flexion: 95  L Knee Extension: 4               Transfers  Sit to Stand: Supervision  Stand to Sit: Supervision    Balance  Sitting: Intact  Standing: Intact; With support              Weight Bearing Status  Left Side Weight Bearing: As tolerated  Distance (ft): 340 Feet (ft)  Ambulation - Level of Assistance: Supervision  Assistive Device: Walker, rolling  Speed/Tamar: Slow  Step Length: Right shortened  Stance: Left decreased  Interventions: Verbal cues; Safety awareness training     Braces/Orthotics:            Body Structures Involved:  1. Bones  2. Joints  3. Muscles Body Functions Affected:  1. Neuromusculoskeletal  2. Movement Related Activities and Participation Affected:  1. General Tasks and Demands  2. Mobility  3. Self Care   Number of elements that affect the Plan of Care: 4+: HIGH COMPLEXITY   CLINICAL PRESENTATION:   Presentation: Stable and uncomplicated: LOW COMPLEXITY   CLINICAL DECISION MAKING:   [employer] AM-PAC 6 Clicks   Basic Mobility Inpatient Short Form  How much difficulty does the patient currently have. .. Unable A Lot A Little None   1. Turning over in bed (including adjusting bedclothes, sheets and blankets)? ? 1   ? 2   ? 3   ? 4   2. Sitting down on and standing up from a chair with arms ( e.g., wheelchair, bedside commode, etc.)   ? 1   ? 2   ? 3   ? 4   3.   Moving from lying on back to sitting on the side of the bed?   ? 1   ? 2 ? 3   ? 4   How much help from another person does the patient currently need. .. Total A Lot A Little None   4. Moving to and from a bed to a chair (including a wheelchair)? ? 1   ? 2   ? 3   ? 4   5. Need to walk in hospital room? ? 1   ? 2   ? 3   ? 4   6. Climbing 3-5 steps with a railing? ? 1   ? 2   ? 3   ? 4   © 2007, Trustees of [facility] [employer], under license to [employer]. All rights reserved     Score:  Initial: 17 Most Recent: X (Date: -- )    Interpretation of Tool:  Represents activities that are increasingly more difficult (i.e. Bed mobility, Transfers, Gait). Medical Necessity:     · Patient demonstrates good  ·  rehab potential due to higher previous functional level. Reason for Services/Other Comments:  · Patient continues to require present interventions due to patient's inability to perform functional mobility at a safe supervision level   · . Use of outcome tool(s) and clinical judgement create a POC that gives a: Clear prediction of patient's progress: LOW COMPLEXITY            TREATMENT:   (In addition to Assessment/Re-Assessment sessions the following treatments were rendered)     Pre-treatment Symptoms/Complaints: Knee a little stiff  Pain: Initial:   Pain Intensity 1: 3  Post Session:  1     Therapeutic Exercise: (13 Minutes):  Exercises per grid below to improve mobility, strength and coordination. Required minimal visual, verbal and tactile cues to promote proper body alignment and promote proper body breathing techniques. Progressed range, repetitions and complexity of movement as indicated. Gait Training (10 Minutes):  Gait training to improve and/or restore physical functioning as related to mobility, strength and balance. Ambulated 340 Feet (ft) with Supervision using a Walker, rolling and minimal Verbal cues; Safety awareness training related to their stance phase to promote proper body alignment, promote proper body posture and promote proper body mechanics. Date:  6/15 Date:   Date:     ACTIVITY/EXERCISE AM PM AM PM AM PM   GROUP THERAPY  ? ?  ?  x  ?  ? Ankle Pumps 20a        Quad Sets 20a        Gluteal Sets 20a        Hip ABd/ADduction 20a        Straight Leg Raises 20a        Knee Slides 20aa        Short Arc Quads 20a        Long Arc Quads         Chair Slides 20aa                 B = bilateral; AA = active assistive; A = active; P = passive      Treatment/Session Assessment:     Response to Treatment:  Doing well. Requires min cues for gait    Education:  ? Home Exercises  ? Fall Precautions  ? Hip Precautions x D/C Instruction Review  x Knee/Hip Prosthesis Review  ? Walker Management/Safety ? Adaptive Equipment as Needed       Interdisciplinary Collaboration:   o Physical Therapist    After treatment position/precautions:   o Up in chair  o Bed/Chair-wheels locked  o Bed in low position  o Call light within reach    Compliance with Program/Exercises: Will assess as treatment progresses. No questions. Recommendations/Intent for next treatment session:  Treatment next visit will focus on increasing Mr. Morrisseys independence with bed mobility, transfers, gait training, strength/ROM exercises, modalities for pain, and patient education.       Total Treatment Duration:  PT Patient Time In/Time Out  Time In: 0917  Time Out: [address], PT

## 2019-06-15 NOTE — PROGRESS NOTES
Alert and oriented  Wound area is benign with no obvious infection  HG stable and acceptable  Denies chest pain or dyspnea  No inordinate pain

## 2019-06-15 NOTE — PROGRESS NOTES
Problem: Mobility Impaired (Adult and Pediatric)  Goal: *Acute Goals and Plan of Care (Insert Text)  Note:   GOALS (1-4 days):  (1.)Mr. Jack Zhao will move from supine to sit and sit to supine  in bed with CONTACT GUARD ASSIST.  (2.)Mr. Jack Zhao will transfer from bed to chair and chair to bed with SUPERVISION using the least restrictive device. (3.)Mr. Jack Zhao will ambulate with SUPERVISION for 150 feet with the least restrictive device. Met 6/15  (4.)Mr. Jack Zhao will ambulate up/down 5 steps with bilateral  railing with CONTACT GUARD ASSIST with no device. (5.)Mr. Jack Zhao will increase left knee ROM to 5°-80°. - met 6/15  ________________________________________________________________________________________________      PHYSICAL THERAPY JOINT CAMP TKA: Daily Note and PM 6/15/2019  INPATIENT: Hospital Day: 4  Payor: [facility] MEDIC[facility] / Plan: [facility] MEDICARE HMO/PPO / Product Type: Managed Care Medicare /      NAME/AGE/GENDER: Mj Sung is a 79 y.o. male   PRIMARY DIAGNOSIS:  Primary osteoarthritis of left knee [M17.12]   Procedure(s) and Anesthesia Type:     * LEFT KNEE ARTHROPLASTY TOTAL / FNB / Aiyana Peace - Spinal (Left)  ICD-10: Treatment Diagnosis:    · Pain in Left Knee (M25.562)  · Stiffness of Left Knee, Not elsewhere classified (T01.496)  · Other abnormalities of gait and mobility (R26.89)      ASSESSMENT:     Mr. Jack Zhao presents S/P L TKA and demonstrates a decline in his premorbid level of function. He presents with decreased L LE strength and ROM, standing balance, functional mobility and TKA awareness. He would benefit from further PT while here to address these issues. Since he lives alone, he plans on trying to go to rehab at [facility]. Pt continues to demo improving gait. Knee with slightly less mobility this afternoon. Overall progressing well with strength and gait form.     This section established at most recent assessment   PROBLEM LIST (Impairments causing functional limitations):  1. Decreased Strength  2. Decreased Transfer Abilities  3. Decreased Ambulation Ability/Technique  4. Decreased Balance  5. Increased Pain  6. Decreased Activity Tolerance  7. Increased Fatigue  8. Decreased Flexibility/Joint Mobility  9. Decreased Knowledge of Precautions  10. Decreased [place] with Home Exercise Program   INTERVENTIONS PLANNED: (Benefits and precautions of physical therapy have been discussed with the patient.)  1. TKA education   2. bed mobility  3. gait training  4. home exercise program (HEP)  5. Range of Motion: active/assisted/passive  6. Therapeutic Activities  7. therapeutic exercise/strengthening  8. transfer training  9. Group Therapy     TREATMENT PLAN: Frequency/Duration: Follow patient BID for duration of hospital stay to address above goals. Rehabilitation Potential For Stated Goals: Good     RECOMMENDED REHABILITATION/EQUIPMENT: (at time of discharge pending progress): Continue Skilled Therapy and Home Health: Physical Therapy. But seems to be interested in rehab              HISTORY:   History of Present Injury/Illness (Reason for Referral):  S/P L TKA  Past Medical History/Comorbidities:   Mr. Charlene Arzate  has a past medical history of Arthritis, BPH (benign prostatic hyperplasia), Diabetes ([address] GERD (gastroesophageal reflux disease), Hepatitis C, HTN (hypertension), Prostate cancer ([address] Sepsis ([address] (04/2018), and Status post total knee replacement, left (6/12/2019). Mr. Charlene Arzate  has a past surgical history that includes hx other surgical (03/30/2018); hx orthopaedic (1972); hx knee arthroscopy (Left, 1994); hx hemorrhoidectomy (1973); hx colonoscopy (2016); and hx heart catheterization (2001).   Social History/Living Environment:   Home Environment: Private residence  # Steps to Enter: 1  Rails to Enter: No  One/Two Story Residence: One story  Living Alone: Yes  Support Systems: [employer] / [employer] community  Patient Expects to be Discharged to[de-identified] Rehabilitation facility  Current DME Used/Available at Home: Cane, straight, Commode, bedside, Crutches, Glucometer, Walker  Tub or Shower Type: Tub/Shower combination  Prior Level of Function/Work/Activity:  Functionally I with ADls and amb. Is a chen   Number of Personal Factors/Comorbidities that affect the Plan of Care: 1-2: MODERATE COMPLEXITY   EXAMINATION:   Most Recent Physical Functioning:      Gross Assessment  AROM: Within functional limits(B UEs and R LE)  Strength: Within functional limits(B UEs and R LE)          LLE AROM  L Knee Flexion: 90  L Knee Extension: 5               Transfers  Sit to Stand: Independent  Stand to Sit: Independent    Balance  Sitting: Intact  Standing: Intact; With support              Weight Bearing Status  Left Side Weight Bearing: As tolerated  Distance (ft): 160 Feet (ft)(+160)  Ambulation - Level of Assistance: Independent  Assistive Device: Walker, rolling  Speed/Tamar: Slow  Step Length: Right shortened  Stance: Left decreased  Interventions: Verbal cues; Safety awareness training     Braces/Orthotics:     Left Knee Cold  Type: Cryocuff      Body Structures Involved:  1. Bones  2. Joints  3. Muscles Body Functions Affected:  1. Neuromusculoskeletal  2. Movement Related Activities and Participation Affected:  1. General Tasks and Demands  2. Mobility  3. Self Care   Number of elements that affect the Plan of Care: 4+: HIGH COMPLEXITY   CLINICAL PRESENTATION:   Presentation: Stable and uncomplicated: LOW COMPLEXITY   CLINICAL DECISION MAKIN [facility] [address] [id number] AM-PAC 6 Clicks   Basic Mobility Inpatient Short Form  How much difficulty does the patient currently have. .. Unable A Lot A Little None   1. Turning over in bed (including adjusting bedclothes, sheets and blankets)? ? 1   ? 2   ? 3   ? 4   2. Sitting down on and standing up from a chair with arms ( e.g., wheelchair, bedside commode, etc.)   ? 1   ? 2   ? 3   ? 4   3.   Moving from lying on back to sitting on the side of the bed?   ? 1   ? 2   ? 3   ? 4   How much help from another person does the patient currently need. .. Total A Lot A Little None   4. Moving to and from a bed to a chair (including a wheelchair)? ? 1   ? 2   ? 3   ? 4   5. Need to walk in hospital room? ? 1   ? 2   ? 3   ? 4   6. Climbing 3-5 steps with a railing? ? 1   ? 2   ? 3   ? 4   © 2007, Trustees of [facility] [place] [employer], under license to [email or web address]. All rights reserved     Score:  Initial: 17 Most Recent: X (Date: -- )    Interpretation of Tool:  Represents activities that are increasingly more difficult (i.e. Bed mobility, Transfers, Gait). Medical Necessity:     · Patient demonstrates good  ·  rehab potential due to higher previous functional level. Reason for Services/Other Comments:  · Patient continues to require present interventions due to patient's inability to perform functional mobility at a safe supervision level   · . Use of outcome tool(s) and clinical judgement create a POC that gives a: Clear prediction of patient's progress: LOW COMPLEXITY            TREATMENT:   (In addition to Assessment/Re-Assessment sessions the following treatments were rendered)     Pre-treatment Symptoms/Complaints: Knee still stiff  Pain: Initial:   Pain Intensity 1: 2  Post Session:  2     Therapeutic Exercise: (30 Minutes(group)):  Exercises per grid below to improve mobility, strength and coordination. Required minimal visual, verbal and tactile cues to promote proper body alignment and promote proper body breathing techniques. Progressed range, repetitions and complexity of movement as indicated. Gait Training (15 Minutes):  Gait training to improve and/or restore physical functioning as related to mobility, strength and balance. Ambulated 160 Feet (ft)(+160) with Independent using a Walker, rolling and minimal Verbal cues; Safety awareness training related to their stance phase to promote proper body alignment, promote proper body posture and promote proper body mechanics. Date:  6/15 Date:   Date:     ACTIVITY/EXERCISE AM PM AM PM AM PM   GROUP THERAPY  ?  x  ?  x  ?  ? Ankle Pumps 20a 20a       Quad Sets 20a 20a       Gluteal Sets 20a 20a       Hip ABd/ADduction 20a 20a       Straight Leg Raises 20a 20a       Knee Slides 20aa 20aa       Short Arc Quads 20a 20a       Long Arc Quads         Chair Slides 20aa 20aa                B = bilateral; AA = active assistive; A = active; P = passive      Treatment/Session Assessment:     Response to Treatment:  Improved gait form    Education:  ? Home Exercises  ? Fall Precautions  ? Hip Precautions x D/C Instruction Review  x Knee/Hip Prosthesis Review  ? Walker Management/Safety ? Adaptive Equipment as Needed       Interdisciplinary Collaboration:   o Physical Therapist  o Rehabilitation Attendant    After treatment position/precautions:   o Up in chair  o Bed/Chair-wheels locked  o Bed in low position  o Call light within reach    Compliance with Program/Exercises: Will assess as treatment progresses. No questions. Recommendations/Intent for next treatment session:  Treatment next visit will focus on increasing Mr. Freitas's independence with bed mobility, transfers, gait training, strength/ROM exercises, modalities for pain, and patient education.       Total Treatment Duration:  PT Patient Time In/Time Out  Time In: 1300  Time Out: [address], PT

## 2019-06-15 NOTE — PROGRESS NOTES
Patient resting quietly in recliner, alert and oriented, no distress noted. Left knee dressing c/d/i, voiding clear yellow urine, ambulates with walker. Neurovascular and peripheral vascular checks WNL. Bed low and locked position. Fresh ice placed in iceman. Call light within reach. Patient instructed to call for assistance, verbalizes understanding. Nursing assessment complete.

## 2019-06-16 LAB
GLUCOSE BLD STRIP.AUTO-MCNC: 133 MG/DL (ref 65–100)
GLUCOSE BLD STRIP.AUTO-MCNC: 184 MG/DL (ref 65–100)
GLUCOSE BLD STRIP.AUTO-MCNC: 221 MG/DL (ref 65–100)

## 2019-06-16 PROCEDURE — 97116 GAIT TRAINING THERAPY: CPT

## 2019-06-16 PROCEDURE — 74011250637 HC RX REV CODE- 250/637: Performed by: PHYSICIAN ASSISTANT

## 2019-06-16 PROCEDURE — 82962 GLUCOSE BLOOD TEST: CPT

## 2019-06-16 PROCEDURE — 74011636637 HC RX REV CODE- 636/637: Performed by: PHYSICIAN ASSISTANT

## 2019-06-16 PROCEDURE — 97110 THERAPEUTIC EXERCISES: CPT

## 2019-06-16 PROCEDURE — 74011250637 HC RX REV CODE- 250/637: Performed by: ORTHOPAEDIC SURGERY

## 2019-06-16 PROCEDURE — 65270000029 HC RM PRIVATE

## 2019-06-16 RX ADMIN — METFORMIN HYDROCHLORIDE 500 MG: 500 TABLET, FILM COATED ORAL at 18:01

## 2019-06-16 RX ADMIN — Medication 5 ML: at 13:47

## 2019-06-16 RX ADMIN — LOSARTAN POTASSIUM 50 MG: 50 TABLET ORAL at 22:00

## 2019-06-16 RX ADMIN — Medication 1 AMPULE: at 22:24

## 2019-06-16 RX ADMIN — HYDROMORPHONE HYDROCHLORIDE 2 MG: 2 TABLET ORAL at 18:07

## 2019-06-16 RX ADMIN — HYDROMORPHONE HYDROCHLORIDE 2 MG: 2 TABLET ORAL at 22:24

## 2019-06-16 RX ADMIN — Medication 5 ML: at 05:31

## 2019-06-16 RX ADMIN — ASPIRIN 81 MG: 81 TABLET ORAL at 09:44

## 2019-06-16 RX ADMIN — TAMSULOSIN HYDROCHLORIDE 0.4 MG: 0.4 CAPSULE ORAL at 22:00

## 2019-06-16 RX ADMIN — ASPIRIN 81 MG: 81 TABLET ORAL at 22:24

## 2019-06-16 RX ADMIN — CYANOCOBALAMIN TAB 1000 MCG 1000 MCG: 1000 TAB at 09:43

## 2019-06-16 RX ADMIN — METFORMIN HYDROCHLORIDE 500 MG: 500 TABLET, FILM COATED ORAL at 09:43

## 2019-06-16 RX ADMIN — HYDROMORPHONE HYDROCHLORIDE 2 MG: 2 TABLET ORAL at 09:43

## 2019-06-16 RX ADMIN — ACETAMINOPHEN 1000 MG: 500 TABLET, FILM COATED ORAL at 09:43

## 2019-06-16 RX ADMIN — CELECOXIB 200 MG: 200 CAPSULE ORAL at 22:24

## 2019-06-16 RX ADMIN — SENNOSIDES AND DOCUSATE SODIUM 2 TABLET: 8.6; 5 TABLET ORAL at 09:43

## 2019-06-16 RX ADMIN — FAMOTIDINE 10 MG: 20 TABLET, FILM COATED ORAL at 18:01

## 2019-06-16 RX ADMIN — Medication 1 AMPULE: at 09:42

## 2019-06-16 RX ADMIN — CELECOXIB 200 MG: 200 CAPSULE ORAL at 09:43

## 2019-06-16 RX ADMIN — FAMOTIDINE 10 MG: 20 TABLET, FILM COATED ORAL at 09:42

## 2019-06-16 RX ADMIN — HYDROMORPHONE HYDROCHLORIDE 2 MG: 2 TABLET ORAL at 00:14

## 2019-06-16 RX ADMIN — INSULIN GLARGINE 20 UNITS: 100 INJECTION, SOLUTION SUBCUTANEOUS at 21:00

## 2019-06-16 RX ADMIN — HYDROMORPHONE HYDROCHLORIDE 2 MG: 2 TABLET ORAL at 05:31

## 2019-06-16 RX ADMIN — ACETAMINOPHEN 1000 MG: 500 TABLET, FILM COATED ORAL at 02:00

## 2019-06-16 RX ADMIN — ACETAMINOPHEN 1000 MG: 500 TABLET, FILM COATED ORAL at 22:25

## 2019-06-16 NOTE — PROGRESS NOTES
Problem: Mobility Impaired (Adult and Pediatric)  Goal: *Acute Goals and Plan of Care (Insert Text)  Note:   GOALS (1-4 days):  (1.)Mr. Clinton Bond will move from supine to sit and sit to supine  in bed with CONTACT GUARD ASSIST. - met 6/16  (2.)Mr. Clinton Bond will transfer from bed to chair and chair to bed with SUPERVISION using the least restrictive device. (3.)Mr. Clinton Bond will ambulate with SUPERVISION for 150 feet with the least restrictive device. Met 6/15  (4.)Mr. Clinton Bond will ambulate up/down 5 steps with bilateral  railing with CONTACT GUARD ASSIST with no device. - goal removed (pt going to rehab)  (5.)Mr. Clinton Bond will increase left knee ROM to 5°-80°. - met 6/15  ________________________________________________________________________________________________      PHYSICAL THERAPY JOINT CAMP TKA: Daily Note and PM 6/16/2019  INPATIENT: Hospital Day: 5  Payor: [facility] [place] MEDICARE / Plan: Jeanra Cardinal [place] / Product Type: Managed Care Medicare /      NAME/AGE/GENDER: Dorenda Harada is a 79 y.o. male   PRIMARY DIAGNOSIS:  Primary osteoarthritis of left knee [M17.12]   Procedure(s) and Anesthesia Type:     * LEFT KNEE ARTHROPLASTY TOTAL / FNB / Guinevere Fisherman - Spinal (Left)  ICD-10: Treatment Diagnosis:    · Pain in Left Knee (M25.562)  · Stiffness of Left Knee, Not elsewhere classified (I57.233)  · Other abnormalities of gait and mobility (R26.89)      ASSESSMENT:     Mr. Clinton Bond presents S/P L TKA and demonstrates a decline in his premorbid level of function. He presents with decreased L LE strength and ROM, standing balance, functional mobility and TKA awareness. He would benefit from further PT while here to address these issues. Since he lives alone, he plans on trying to go to rehab at [facility]. Pt presents up in recliner. He is just finishing 100 reps of ankle pumps, quad sets, glut sets as PT arrived.  Able to perform increased reps of other exercises and demos improved gait form and distance today.    This section established at most recent assessment   PROBLEM LIST (Impairments causing functional limitations):  1. Decreased Strength  2. Decreased Transfer Abilities  3. Decreased Ambulation Ability/Technique  4. Decreased Balance  5. Increased Pain  6. Decreased Activity Tolerance  7. Increased Fatigue  8. Decreased Flexibility/Joint Mobility  9. Decreased Knowledge of Precautions  10. Decreased [place] with Home Exercise Program   INTERVENTIONS PLANNED: (Benefits and precautions of physical therapy have been discussed with the patient.)  1. TKA education   2. bed mobility  3. gait training  4. home exercise program (HEP)  5. Range of Motion: active/assisted/passive  6. Therapeutic Activities  7. therapeutic exercise/strengthening  8. transfer training  9. Group Therapy     TREATMENT PLAN: Frequency/Duration: Follow patient BID for duration of hospital stay to address above goals. Rehabilitation Potential For Stated Goals: Good     RECOMMENDED REHABILITATION/EQUIPMENT: (at time of discharge pending progress): Continue Skilled Therapy and Home Health: Physical Therapy. But seems to be interested in rehab              HISTORY:   History of Present Injury/Illness (Reason for Referral):  S/P L TKA  Past Medical History/Comorbidities:   Mr. Torsten Robert  has a past medical history of Arthritis, BPH (benign prostatic hyperplasia), Diabetes ([address] GERD (gastroesophageal reflux disease), Hepatitis C, HTN (hypertension), Prostate cancer ([address] Sepsis ([address] (04/2018), and Status post total knee replacement, left (6/12/2019). Mr. Torsten Robert  has a past surgical history that includes hx other surgical (03/30/2018); hx orthopaedic (1972); hx knee arthroscopy (Left, 1994); hx hemorrhoidectomy (1973); hx colonoscopy (2016); and hx heart catheterization (2001).   Social History/Living Environment:   Home Environment: Private residence  # Steps to Enter: 1  Rails to Enter: No  One/Two Story Residence: One story  Living Alone: Yes  Support Systems: [employer] / ox[employer] community  Patient Expects to be Discharged to[de-identified] Rehabilitation facility  Current DME Used/Available at Home: [place], straight, Commode, bedside, Crutches, Glucometer, Walker  Tub or Shower Type: Tub/Shower combination  Prior Level of Function/Work/Activity:  Functionally I with ADls and amb. Is a chen   Number of Personal Factors/Comorbidities that affect the Plan of Care: 1-2: MODERATE COMPLEXITY   EXAMINATION:   Most Recent Physical Functioning:      Gross Assessment  AROM: Within functional limits(B UEs and R LE)  Strength: Within functional limits(B UEs and R LE)          LLE AROM  L Knee Flexion: 91  L Knee Extension: 4          Bed Mobility  Rolling: Independent  Supine to Sit: Independent    Transfers  Sit to Stand: Independent  Stand to Sit: Independent    Balance  Sitting: Intact  Standing: Intact              Weight Bearing Status  Left Side Weight Bearing: As tolerated  Distance (ft): 460 Feet (ft)  Ambulation - Level of Assistance: Supervision  Assistive Device: Walker, rolling  Speed/Tamar: Slow  Step Length: Right shortened  Stance: Left decreased  Gait Abnormalities: Antalgic  Interventions: Verbal cues     Braces/Orthotics:     Left Knee Cold  Type: Cryocuff      Body Structures Involved:  1. Bones  2. Joints  3. Muscles Body Functions Affected:  1. Neuromusculoskeletal  2. Movement Related Activities and Participation Affected:  1. General Tasks and Demands  2. Mobility  3. Self Care   Number of elements that affect the Plan of Care: 4+: HIGH COMPLEXITY   CLINICAL PRESENTATION:   Presentation: Stable and uncomplicated: LOW COMPLEXITY   CLINICAL DECISION MAKING:   [employer] AM-PAC 6 Clicks   Basic Mobility Inpatient Short Form  How much difficulty does the patient currently have. .. Unable A Lot A Little None   1. Turning over in bed (including adjusting bedclothes, sheets and blankets)? ? 1   ? 2   ? 3   ? 4   2.   Sitting down on and standing up from a chair with arms ( e.g., wheelchair, bedside commode, etc.)   ? 1   ? 2   ? 3   ? 4   3. Moving from lying on back to sitting on the side of the bed?   ? 1   ? 2   ? 3   ? 4   How much help from another person does the patient currently need. .. Total A Lot A Little None   4. Moving to and from a bed to a chair (including a wheelchair)? ? 1   ? 2   ? 3   ? 4   5. Need to walk in hospital room? ? 1   ? 2   ? 3   ? 4   6. Climbing 3-5 steps with a railing? ? 1   ? 2   ? 3   ? 4   © 2007, Trustees of [address] [place] [address] [address] [address], under license to [employer]. All rights reserved     Score:  Initial: 17 Most Recent: X (Date: -- )    Interpretation of Tool:  Represents activities that are increasingly more difficult (i.e. Bed mobility, Transfers, Gait). Medical Necessity:     · Patient demonstrates good  ·  rehab potential due to higher previous functional level. Reason for Services/Other Comments:  · Patient continues to require present interventions due to patient's inability to perform functional mobility at a safe supervision level   · . Use of outcome tool(s) and clinical judgement create a POC that gives a: Clear prediction of patient's progress: LOW COMPLEXITY            TREATMENT:   (In addition to Assessment/Re-Assessment sessions the following treatments were rendered)     Pre-treatment Symptoms/Complaints:Feeling less sore  Pain: Initial:   Pain Intensity 1: 3  Pain Location 1: Knee  Post Session:  3     Therapeutic Exercise: (6 Minutes):  Exercises per grid below to improve mobility, strength and coordination. Required minimal visual, verbal and tactile cues to promote proper body alignment and promote proper body breathing techniques. Progressed range, repetitions and complexity of movement as indicated. Gait Training (10 Minutes):  Gait training to improve and/or restore physical functioning as related to mobility, strength and balance.   Ambulated 460 Feet (ft) with Supervision using a Walker, rolling and minimal Verbal cues related to their stance phase to promote proper body alignment, promote proper body posture and promote proper body mechanics. Date:  6/15 Date:  6/16 Date:     ACTIVITY/EXERCISE AM PM AM PM AM PM   GROUP THERAPY  ?  x  ?    ?  ? Ankle Pumps 20a 20a 20a 10a     Quad Sets 20a 20a 20a      Gluteal Sets 20a 20a 20a      Hip ABd/ADduction 20a 20a 20a 30a     Straight Leg Raises 20a 20a 20a 30a     Knee Slides 20aa 20aa 20aa 30aa     Short Arc Quads 20a 20a 20a 30a     Long Arc Quads         Chair Slides 20aa 20aa 20aa 30aa              B = bilateral; AA = active assistive; A = active; P = passive      Treatment/Session Assessment:     Response to Treatment:  Improved gait distance    Education:  ? Home Exercises  ? Fall Precautions  ? Hip Precautions x D/C Instruction Review  x Knee/Hip Prosthesis Review  ? Walker Management/Safety ? Adaptive Equipment as Needed       Interdisciplinary Collaboration:   o Physical Therapist    After treatment position/precautions:   o Up in chair  o Bed/Chair-wheels locked  o Bed in low position  o Call light within reach    Compliance with Program/Exercises: Will assess as treatment progresses. No questions. Recommendations/Intent for next treatment session:  Treatment next visit will focus on increasing Mr. Freitas's independence with bed mobility, transfers, gait training, strength/ROM exercises, modalities for pain, and patient education.       Total Treatment Duration:  PT Patient Time In/Time Out  Time In: 1303  Time Out: Vicky 88, PT

## 2019-06-16 NOTE — PROGRESS NOTES
Care Management Interventions  PCP Verified by CM: Yes  Mode of Transport at Discharge: BLS  Transition of Care Consult (CM Consult): SNF  Partner SNF: Yes  Physical Therapy Consult: Yes  Current Support Network: Lives Alone  Plan discussed with Pt/Family/Caregiver: Yes  Freedom of Choice Offered: Yes  Discharge Location  Discharge Placement: Skilled nursing facility  To [facility] hopefully Monday pending precert.

## 2019-06-16 NOTE — PROGRESS NOTES
Problem: Mobility Impaired (Adult and Pediatric)  Goal: *Acute Goals and Plan of Care (Insert Text)  Note:   GOALS (1-4 days):  (1.)Mr. Fam García will move from supine to sit and sit to supine  in bed with CONTACT GUARD ASSIST. - met 6/16  (2.)Mr. Fam García will transfer from bed to chair and chair to bed with SUPERVISION using the least restrictive device. (3.)Mr. Fam García will ambulate with SUPERVISION for 150 feet with the least restrictive device. Met 6/15  (4.)Mr. Fam García will ambulate up/down 5 steps with bilateral  railing with CONTACT GUARD ASSIST with no device. - goal removed (pt going to rehab)  (5.)Mr. Fam García will increase left knee ROM to 5°-80°. - met 6/15  ________________________________________________________________________________________________      PHYSICAL THERAPY JOINT CAMP TKA: Daily Note and AM 6/16/2019  INPATIENT: Hospital Day: 5  Payor: [facility] MEDIC[facility] / Plan: Maria Esther Fox [place] / Product Type: Managed Care Medicare /      NAME/AGE/GENDER: Malissa Parry is a 79 y.o. male   PRIMARY DIAGNOSIS:  Primary osteoarthritis of left knee [M17.12]   Procedure(s) and Anesthesia Type:     * LEFT KNEE ARTHROPLASTY TOTAL / FNB / Genevie Lie - Spinal (Left)  ICD-10: Treatment Diagnosis:    · Pain in Left Knee (M25.562)  · Stiffness of Left Knee, Not elsewhere classified (O23.071)  · Other abnormalities of gait and mobility (R26.89)      ASSESSMENT:     Mr. Fam García presents S/P L TKA and demonstrates a decline in his premorbid level of function. He presents with decreased L LE strength and ROM, standing balance, functional mobility and TKA awareness. He would benefit from further PT while here to address these issues. Since he lives alone, he plans on trying to go to rehab at [facility]. Pt presents supine in bed. Good bed mobility and dec c/o pain and stiffness today. He states he has been working hard on exercises throughout the night. Improved gait form with decreased cueing today.     This section established at most recent assessment   PROBLEM LIST (Impairments causing functional limitations):  1. Decreased Strength  2. Decreased Transfer Abilities  3. Decreased Ambulation Ability/Technique  4. Decreased Balance  5. Increased Pain  6. Decreased Activity Tolerance  7. Increased Fatigue  8. Decreased Flexibility/Joint Mobility  9. Decreased Knowledge of Precautions  10. Decreased [place] with Home Exercise Program   INTERVENTIONS PLANNED: (Benefits and precautions of physical therapy have been discussed with the patient.)  1. TKA education   2. bed mobility  3. gait training  4. home exercise program (HEP)  5. Range of Motion: active/assisted/passive  6. Therapeutic Activities  7. therapeutic exercise/strengthening  8. transfer training  9. Group Therapy     TREATMENT PLAN: Frequency/Duration: Follow patient BID for duration of hospital stay to address above goals. Rehabilitation Potential For Stated Goals: Good     RECOMMENDED REHABILITATION/EQUIPMENT: (at time of discharge pending progress): Continue Skilled Therapy and Home Health: Physical Therapy. But seems to be interested in rehab              HISTORY:   History of Present Injury/Illness (Reason for Referral):  S/P L TKA  Past Medical History/Comorbidities:   Mr. Patt Lesch  has a past medical history of Arthritis, BPH (benign prostatic hyperplasia), Diabetes ([address] GERD (gastroesophageal reflux disease), Hepatitis C, HTN (hypertension), Prostate cancer ([address] Sepsis ([address] (04/2018), and Status post total knee replacement, left (6/12/2019). Mr. Patt Lesch  has a past surgical history that includes hx other surgical (03/30/2018); hx orthopaedic (1972); hx knee arthroscopy (Left, 1994); hx hemorrhoidectomy (1973); hx colonoscopy (2016); and hx heart catheterization (2001).   Social History/Living Environment:   Home Environment: Private residence  # Steps to Enter: 1  Rails to Enter: No  One/Two Story Residence: One story  Living Alone: Yes  Support Systems: W[employer] / ox[employer] community  Patient Expects to be Discharged to[de-identified] Rehabilitation facility  Current DME Used/Available at Home: [place], straight, Commode, bedside, Crutches, Glucometer, Walker  Tub or Shower Type: Tub/Shower combination  Prior Level of Function/Work/Activity:  Functionally I with ADls and amb. Is a chen   Number of Personal Factors/Comorbidities that affect the Plan of Care: 1-2: MODERATE COMPLEXITY   EXAMINATION:   Most Recent Physical Functioning:      Gross Assessment  AROM: Within functional limits(B UEs and R LE)  Strength: Within functional limits(B UEs and R LE)          LLE AROM  L Knee Flexion: 92  L Knee Extension: 4          Bed Mobility  Rolling: Independent  Supine to Sit: Independent    Transfers  Sit to Stand: Independent  Stand to Sit: Independent    Balance  Sitting: Intact  Standing: Intact;Pull to stand              Weight Bearing Status  Left Side Weight Bearing: As tolerated  Distance (ft): 340 Feet (ft)  Ambulation - Level of Assistance: Supervision  Assistive Device: Walker, rolling  Speed/Tamar: Slow  Step Length: Right shortened  Stance: Left decreased  Gait Abnormalities: Antalgic  Interventions: Verbal cues     Braces/Orthotics:     Left Knee Cold  Type: Cryocuff      Body Structures Involved:  1. Bones  2. Joints  3. Muscles Body Functions Affected:  1. Neuromusculoskeletal  2. Movement Related Activities and Participation Affected:  1. General Tasks and Demands  2. Mobility  3. Self Care   Number of elements that affect the Plan of Care: 4+: HIGH COMPLEXITY   CLINICAL PRESENTATION:   Presentation: Stable and uncomplicated: LOW COMPLEXITY   CLINICAL DECISION MAKIN [facility] [address] [id number] AM-PAC 6 Clicks   Basic Mobility Inpatient Short Form  How much difficulty does the patient currently have. .. Unable A Lot A Little None   1. Turning over in bed (including adjusting bedclothes, sheets and blankets)? ? 1   ? 2   ? 3   ? 4   2.   Sitting down on and standing up from a chair with arms ( e.g., wheelchair, bedside commode, etc.)   ? 1   ? 2   ? 3   ? 4   3. Moving from lying on back to sitting on the side of the bed?   ? 1   ? 2   ? 3   ? 4   How much help from another person does the patient currently need. .. Total A Lot A Little None   4. Moving to and from a bed to a chair (including a wheelchair)? ? 1   ? 2   ? 3   ? 4   5. Need to walk in hospital room? ? 1   ? 2   ? 3   ? 4   6. Climbing 3-5 steps with a railing? ? 1   ? 2   ? 3   ? 4   © 2007, Trustees of [facility] [employer], under license to [employer]. All rights reserved     Score:  Initial: 17 Most Recent: X (Date: -- )    Interpretation of Tool:  Represents activities that are increasingly more difficult (i.e. Bed mobility, Transfers, Gait). Medical Necessity:     · Patient demonstrates good  ·  rehab potential due to higher previous functional level. Reason for Services/Other Comments:  · Patient continues to require present interventions due to patient's inability to perform functional mobility at a safe supervision level   · . Use of outcome tool(s) and clinical judgement create a POC that gives a: Clear prediction of patient's progress: LOW COMPLEXITY            TREATMENT:   (In addition to Assessment/Re-Assessment sessions the following treatments were rendered)     Pre-treatment Symptoms/Complaints:Feeling a little tired but knee is better overall. Pain: Initial:   Pain Intensity 1: 4  Pain Location 1: Knee  Post Session:  3     Therapeutic Exercise: (15 Minutes):  Exercises per grid below to improve mobility, strength and coordination. Required minimal visual, verbal and tactile cues to promote proper body alignment and promote proper body breathing techniques. Progressed range, repetitions and complexity of movement as indicated. Gait Training (8 Minutes):  Gait training to improve and/or restore physical functioning as related to mobility, strength and balance.   Ambulated 340 Feet (ft) with Supervision using a Walker, rolling and minimal Verbal cues related to their stance phase to promote proper body alignment, promote proper body posture and promote proper body mechanics. Date:  6/15 Date:  6/16 Date:     ACTIVITY/EXERCISE AM PM AM PM AM PM   GROUP THERAPY  ?  x  ?  x  ?  ? Ankle Pumps 20a 20a 20a      Quad Sets 20a 20a 20a      Gluteal Sets 20a 20a 20a      Hip ABd/ADduction 20a 20a 20a      Straight Leg Raises 20a 20a 20a      Knee Slides 20aa 20aa 20aa      Short Arc Quads 20a 20a 20a      Long Arc Quads         Chair Slides 20aa 20aa 20aa               B = bilateral; AA = active assistive; A = active; P = passive      Treatment/Session Assessment:     Response to Treatment:  Improving knee mobility    Education:  ? Home Exercises  ? Fall Precautions  ? Hip Precautions x D/C Instruction Review  x Knee/Hip Prosthesis Review  ? Walker Management/Safety ? Adaptive Equipment as Needed       Interdisciplinary Collaboration:   o Physical Therapist    After treatment position/precautions:   o Up in chair  o Bed/Chair-wheels locked  o Bed in low position  o Call light within reach    Compliance with Program/Exercises: Will assess as treatment progresses. No questions. Recommendations/Intent for next treatment session:  Treatment next visit will focus on increasing Mr. Freitas's independence with bed mobility, transfers, gait training, strength/ROM exercises, modalities for pain, and patient education.       Total Treatment Duration:  PT Patient Time In/Time Out  Time In: 0842  Time Out: Ian 5242 ABRAHAM Lim

## 2019-06-16 NOTE — PROGRESS NOTES
Patient resting quietly, alert and oriented, no distress noted. Left knee dressing c/d/i, voiding clear yellow urine, ambulates with walker. Neurovascular and peripheral vascular checks WNL. Bed low and locked position. Fresh ice placed in iceman. Call light within reach. Patient instructed to call for assistance, verbalizes understanding. Nursing assessment complete.

## 2019-06-16 NOTE — PROGRESS NOTES
Orthopedic Joint Progress Note    2019  Admit Date: 2019  Admit Diagnosis: Primary osteoarthritis of left knee [M17.12]  Osteoarthritis [M19.90]  Osteoarthritis [M19.90]    4 Days Post-Op    Subjective:     Tremaine Welch doing well      Objective:     PT/OT:     PATIENT MOBILITY    Bed Mobility  Supine to Sit: Supervision  Scooting: Independent  Transfers  Sit to Stand: Independent  Stand to Sit: Independent  Stand Pivot Transfers: Contact guard assistance  Bed to Chair: Supervision      Gait  Speed/Tamar: Slow  Step Length: Right shortened  Stance: Left decreased  Gait Abnormalities: Antalgic  Ambulation - Level of Assistance: Independent  Distance (ft): 160 Feet (ft)(+160)  Assistive Device: Walker, rolling  Interventions: Verbal cues, Safety awareness training  Duration: 15 Minutes   Weight Bearing Status  Left Side Weight Bearing: As tolerated        Vital Signs:    Blood pressure (!) 147/94, pulse 86, temperature 97.6 °F (36.4 °C), resp. rate 16, height 6' 2\" (1.88 m), weight 100.2 kg (221 lb), SpO2 96 %.   Temp (24hrs), Av.9 °F (36.6 °C), Min:97.6 °F (36.4 °C), Max:98.2 °F (36.8 °C)      Pain Control:   Pain Assessment  Pain Scale 1: Numeric (0 - 10)  Pain Intensity 1: 2  Pain Onset 1: years ago  Pain Location 1: Knee  Pain Orientation 1: Left  Pain Description 1: Aching  Pain Intervention(s) 1: Medication (see MAR)    Meds:  Current Facility-Administered Medications   Medication Dose Route Frequency    cyanocobalamin tablet 1,000 mcg  1,000 mcg Oral DAILY    insulin glargine (LANTUS) injection 20 Units  20 Units SubCUTAneous QHS    losartan (COZAAR) tablet 50 mg  50 mg Oral QHS    famotidine (PEPCID) tablet 10 mg  10 mg Oral BID    tamsulosin (FLOMAX) capsule 0.4 mg  0.4 mg Oral QHS    sodium chloride (NS) flush 5-40 mL  5-40 mL IntraVENous Q8H    sodium chloride (NS) flush 5-40 mL  5-40 mL IntraVENous PRN    acetaminophen (TYLENOL) tablet 1,000 mg  1,000 mg Oral Q6H    celecoxib (CELEBREX) capsule 200 mg  200 mg Oral Q12H    HYDROmorphone (DILAUDID) tablet 2 mg  2 mg Oral Q4H PRN    HYDROmorphone (PF) (DILAUDID) injection 1 mg  1 mg IntraVENous Q3H PRN    naloxone (NARCAN) injection 0.2-0.4 mg  0.2-0.4 mg IntraVENous Q10MIN PRN    ondansetron (ZOFRAN) injection 4 mg  4 mg IntraVENous Q4H PRN    diphenhydrAMINE (BENADRYL) capsule 25 mg  25 mg Oral Q4H PRN    senna-docusate (PERICOLACE) 8.6-50 mg per tablet 2 Tab  2 Tab Oral DAILY    aspirin delayed-release tablet 81 mg  81 mg Oral Q12H    alcohol 62% (NOZIN) nasal  1 Ampule  1 Ampule Topical Q12H    fluticasone propionate (FLONASE) 50 mcg/actuation nasal spray 2 Spray  2 Spray Both Nostrils DAILY PRN    metFORMIN (GLUCOPHAGE) tablet 500 mg  500 mg Oral BID WITH MEALS        LAB:    Lab Results   Component Value Date/Time    INR 1.0 06/07/2019 03:16 PM    INR 1.0 04/09/2019 07:45 AM     Lab Results   Component Value Date/Time    HGB 10.2 (L) 06/15/2019 03:58 AM    HGB 9.8 (L) 06/14/2019 04:32 AM    HGB 11.0 (L) 06/13/2019 04:33 AM       Wound Knee Left (Active)   Dressing Status Clean, dry, and intact 6/15/2019  8:00 PM   Dressing Type Aquacel 6/15/2019  8:00 PM   Number of days: 4         Physical Exam:  No significant changes   Wound c/d/i  Calves supple  NVI distally    Assessment:      Principal Problem:    Status post total knee replacement, left (6/12/2019)    Active Problems:    Osteoarthritis (6/12/2019)         Plan:     Continue PT/OT/Rehab  Rehab monday  dispo planning    Patient Expects to be Discharged to[de-identified] Rehabilitation facility

## 2019-06-17 VITALS
OXYGEN SATURATION: 97 % | TEMPERATURE: 97.6 F | DIASTOLIC BLOOD PRESSURE: 78 MMHG | SYSTOLIC BLOOD PRESSURE: 165 MMHG | BODY MASS INDEX: 28.36 KG/M2 | WEIGHT: 221 LBS | RESPIRATION RATE: 16 BRPM | HEIGHT: 74 IN | HEART RATE: 84 BPM

## 2019-06-17 LAB
GLUCOSE BLD STRIP.AUTO-MCNC: 183 MG/DL (ref 65–100)
GLUCOSE BLD STRIP.AUTO-MCNC: 305 MG/DL (ref 65–100)

## 2019-06-17 PROCEDURE — 74011250637 HC RX REV CODE- 250/637: Performed by: PHYSICIAN ASSISTANT

## 2019-06-17 PROCEDURE — 97110 THERAPEUTIC EXERCISES: CPT

## 2019-06-17 PROCEDURE — 77030012935 HC DRSG AQUACEL BMS -B

## 2019-06-17 PROCEDURE — 97116 GAIT TRAINING THERAPY: CPT

## 2019-06-17 PROCEDURE — 99239 HOSP IP/OBS DSCHRG MGMT >30: CPT | Performed by: PHYSICAL MEDICINE & REHABILITATION

## 2019-06-17 PROCEDURE — 82962 GLUCOSE BLOOD TEST: CPT

## 2019-06-17 PROCEDURE — 74011250637 HC RX REV CODE- 250/637: Performed by: ORTHOPAEDIC SURGERY

## 2019-06-17 PROCEDURE — 97150 GROUP THERAPEUTIC PROCEDURES: CPT

## 2019-06-17 RX ORDER — FACIAL-BODY WIPES
10 EACH TOPICAL DAILY PRN
Status: DISCONTINUED | OUTPATIENT
Start: 2019-06-17 | End: 2019-06-17 | Stop reason: HOSPADM

## 2019-06-17 RX ADMIN — ACETAMINOPHEN 1000 MG: 500 TABLET, FILM COATED ORAL at 05:32

## 2019-06-17 RX ADMIN — CELECOXIB 200 MG: 200 CAPSULE ORAL at 08:41

## 2019-06-17 RX ADMIN — ASPIRIN 81 MG: 81 TABLET ORAL at 08:41

## 2019-06-17 RX ADMIN — SENNOSIDES AND DOCUSATE SODIUM 2 TABLET: 8.6; 5 TABLET ORAL at 08:41

## 2019-06-17 RX ADMIN — ACETAMINOPHEN 1000 MG: 500 TABLET, FILM COATED ORAL at 14:01

## 2019-06-17 RX ADMIN — Medication 1 AMPULE: at 08:43

## 2019-06-17 RX ADMIN — BISACODYL 10 MG: 10 SUPPOSITORY RECTAL at 11:57

## 2019-06-17 NOTE — PROGRESS NOTES
Problem: Mobility Impaired (Adult and Pediatric)  Goal: *Acute Goals and Plan of Care (Insert Text)  Note:   GOALS (1-4 days):  (1.)Mr. Lindsey Drummond will move from supine to sit and sit to supine  in bed with CONTACT GUARD ASSIST. - met 6/16  (2.)Mr. Lindsey Drummond will transfer from bed to chair and chair to bed with SUPERVISION using the least restrictive device. (3.)Mr. Lindsey Drummond will ambulate with SUPERVISION for 150 feet with the least restrictive device. Met 6/15  (4.)Mr. Lindsey Drummond will ambulate up/down 5 steps with bilateral  railing with CONTACT GUARD ASSIST with no device. - goal removed (pt going to rehab)  (5.)Mr. Lindsey Drummond will increase left knee ROM to 5°-80°. - met 6/15  ________________________________________________________________________________________________      PHYSICAL THERAPY JOINT CAMP TKA: Daily Note and AM 6/17/2019  INPATIENT: Hospital Day: 6  Payor: [facility] MEDICARE / Plan: [facility] [place] MED[place]/PPO / Product Type: Managed Care Medicare /      NAME/AGE/GENDER: Stefany Perez is a 79 y.o. male   PRIMARY DIAGNOSIS:  Primary osteoarthritis of left knee [M17.12]   Procedure(s) and Anesthesia Type:     * LEFT KNEE ARTHROPLASTY TOTAL / FNB / Sharyne Neighbours - Spinal (Left)  ICD-10: Treatment Diagnosis:    · Pain in Left Knee (M25.562)  · Stiffness of Left Knee, Not elsewhere classified (E80.959)  · Other abnormalities of gait and mobility (R26.89)      ASSESSMENT:     Mr. Lindsey Drummond presents S/P L TKA and demonstrates a decline in his premorbid level of function. He presents with decreased L LE strength and ROM, standing balance, functional mobility and TKA awareness. He would benefit from further PT while here to address these issues. Since he lives alone, he plans on trying to go to rehab at [facility]. Pt is making good progress with TK exercises and gait. This section established at most recent assessment   PROBLEM LIST (Impairments causing functional limitations):  1. Decreased Strength  2.  Decreased Transfer Abilities  3. Decreased Ambulation Ability/Technique  4. Decreased Balance  5. Increased Pain  6. Decreased Activity Tolerance  7. Increased Fatigue  8. Decreased Flexibility/Joint Mobility  9. Decreased Knowledge of Precautions  10. Decreased [place] with Home Exercise Program   INTERVENTIONS PLANNED: (Benefi ts and precautions of physical therapy have been discussed with the patient.)  1. TKA education   2. bed mobility  3. gait training  4. home exercise program (HEP)  5. Range of Motion: active/assisted/passive  6. Therapeutic Activities  7. therapeutic exercise/strengthening  8. transfer training  9. Group Therapy     TREATMENT PLAN: Frequency/Duration: Follow patient BID for duration of hospital stay to address above goals. Rehabilitation Potential For Stated Goals: Good     RECOMMENDED REHABILITATION/EQUIPMENT: (at time of discharge pending progress): Continue Skilled Therapy and Home Health: Physical Therapy. But seems to be interested in rehab              HISTORY:   History of Present Injury/Illness (Reason for Referral):  S/P L TKA  Past Medical History/Comorbidities:   Mr. Azeb Trejo  has a past medical history of Arthritis, BPH (benign prostatic hyperplasia), Diabetes ([facility] [address] GERD (gastroesophageal reflux disease), Hepatitis C, HTN (hypertension), Prostate cancer ([address] Sepsis ([facility] [address] (04/2018), and Status post total knee replacement, left (6/12/2019). Mr. Azeb Trejo  has a past surgical history that includes hx other surgical (03/30/2018); hx orthopaedic (1972); hx knee arthroscopy (Left, 1994); hx hemorrhoidectomy (1973); hx colonoscopy (2016); and hx heart catheterization (2001).   Social History/Living Environment:   Home Environment: Private residence  # Steps to Enter: 1  Rails to Enter: No  One/Two Story Residence: One story  Living Alone: Yes  Support Systems: [employer] / [employer] community  Patient Expects to be Discharged to[de-identified] Rehabilitation facility  Current DME Used/Available at Home: [place], straight, Commode, bedside, Crutches, Glucometer, Walker  Tub or Shower Type: Tub/Shower combination  Prior Level of Function/Work/Activity:  Functionally I with ADls and amb. Is a chen   Number of Personal Factors/Comorbidities that affect the Plan of Care: 1-2: MODERATE COMPLEXITY   EXAMINATION:   Most Recent Physical Functioning:                 LLE AROM  L Knee Flexion: 93  L Knee Extension: 4               Transfers  Sit to Stand: Independent  Stand to Sit: Independent                   Weight Bearing Status  Left Side Weight Bearing: As tolerated  Distance (ft): 460 Feet (ft)  Ambulation - Level of Assistance: Supervision  Assistive Device: Walker, rolling        Braces/Orthotics:     Left Knee Cold  Type: Cryocuff      Body Structures Involved:  1. Bones  2. Joints  3. Muscles Body Functions Affected:  1. Neuromusculoskeletal  2. Movement Related Activities and Participation Affected:  1. General Tasks and Demands  2. Mobility  3. Self Care   Number of elements that affect the Plan of Care: 4+: HIGH COMPLEXITY   CLINICAL PRESENTATION:   Presentation: Stable and uncomplicated: LOW COMPLEXITY   CLINICAL DECISION MAKIN [facility] [address] [id number] AM-PAC 6 Clicks   Basic Mobility Inpatient Short Form  How much difficulty does the patient currently have. .. Unable A Lot A Little None   1. Turning over in bed (including adjusting bedclothes, sheets and blankets)? ? 1   ? 2   ? 3   ? 4   2. Sitting down on and standing up from a chair with arms ( e.g., wheelchair, bedside commode, etc.)   ? 1   ? 2   ? 3   ? 4   3. Moving from lying on back to sitting on the side of the bed?   ? 1   ? 2   ? 3   ? 4   How much help from another person does the patient currently need. .. Total A Lot A Little None   4. Moving to and from a bed to a chair (including a wheelchair)? ? 1   ? 2   ? 3   ? 4   5. Need to walk in hospital room? ? 1   ? 2   ? 3   ? 4   6. Climbing 3-5 steps with a railing?    ? 1   ? 2   ? 3   ? 4   © 2007, Trustees of [address] [place] [address] [address] [id number], under license to [employer]. All rights reserved     Score:  Initial: 17 Most Recent: X (Date: -- )    Interpretation of Tool:  Represents activities that are increasingly more difficult (i.e. Bed mobility, Transfers, Gait). Medical Necessity:     · Patient demonstrates good  ·  rehab potential due to higher previous functional level. Reason for Services/Other Comments:  · Patient continues to require present interventions due to patient's inability to perform functional mobility at a safe supervision level   · . Use of outcome tool(s) and clinical judgement create a POC that gives a: Clear prediction of patient's progress: LOW COMPLEXITY            TREATMENT:   (In addition to Assessment/Re-Assessment sessions the following treatments were rendered)     Pre-treatment Symptoms/Complaints doing good   Pain: Initial:   Pain Intensity 1: 0  Post Session:       Therapeutic Exercise: (45 Minutes(group therapy)):  Exercises per grid below to improve mobility, strength and coordination. Required minimal visual, verbal and tactile cues to promote proper body alignment and promote proper body breathing techniques. Progressed range, repetitions and complexity of movement as indicated. Gait Training (15 Minutes):  Gait training to improve and/or restore physical functioning as related to mobility, strength and balance. Ambulated 460 Feet (ft) with Supervision using a Walker, rolling and minimal   related to their stance phase to promote proper body alignment, promote proper body posture and promote proper body mechanics. Date:  6/15 Date:  6/16 Date:  6/17     ACTIVITY/EXERCISE AM PM AM PM AM PM   GROUP THERAPY  ?  x  ?    x  ?    Ankle Pumps 20a 20a 20a 10a 20    Quad Sets 20a 20a 20a  20    Gluteal Sets 20a 20a 20a  20    Hip ABd/ADduction 20a 20a 20a 30a 30    Straight Leg Raises 20a 20a 20a 30a 30    Knee Slides 20aa 20aa 20aa 30aa 30    Short Arc Quads 20a 20a 20a 30a [address]             B = bilateral; AA = active assistive; A = active; P = passive      Treatment/Session Assessment:     Response to Treatment:  Doing well    Education:  ? Home Exercises  ? Fall Precautions  ? Hip Precautions x D/C Instruction Review  x Knee/Hip Prosthesis Review  ? Walker Management/Safety ? Adaptive Equipment as Needed       Interdisciplinary Collaboration:   o Physical Therapy Assistant    After treatment position/precautions:   o Up in chair  o Bed/Chair-wheels locked  o Bed in low position  o Call light within reach    Compliance with Program/Exercises: Will assess as treatment progresses. No questions. Recommendations/Intent for next treatment session:  Treatment next visit will focus on increasing Mr. Morrisseys independence with bed mobility, transfers, gait training, strength/ROM exercises, modalities for pain, and patient education.       Total Treatment Duration:  PT Patient Time In/Time Out  Time In: 0930  Time Out: Dev [address], PTA

## 2019-06-17 NOTE — PHYSICIAN ADVISORY
Letter of Determination: Upgrade from Observation to Inpatient Status This patient was originally hospitalized as Outpatient Status with Observation Services on 6/12/2019 for scheduled left total knee arthroplasty. This patient now meets for Inpatient Admission based on medical necessity. The patient's stay was medically necessary based on age > 72, diabetes mellitus type 2 with hemoglobin A1c of 8.0 %, and hypertension. The medical plan is for the patient to go to rehab post operatively. It is our recommendation that this patient's hospitalization status should be upgraded from OBSERVATION to INPATIENT status.  
  
The final decision regarding the patient's hospitalization status depends on the attending physician's judgement. Ted Manrique MD, [place], Physician Advisor [address] [place] [address].

## 2019-06-17 NOTE — PROGRESS NOTES
TRANSFER - OUT REPORT:    Verbal report given to Πάν[address] 90 on Christiana Araya  being transferred to [facility]  for routine post - op       Report consisted of patients Situation, Background, Assessment and   Recommendations(SBAR). Information from the following report(s) SBAR was reviewed with the receiving nurse. Lines:       Opportunity for questions and clarification was provided.       Patient transported with:

## 2019-06-17 NOTE — PROGRESS NOTES
2019         Post Op day: 5 Days Post-OpProcedure(s) (LRB):  LEFT KNEE ARTHROPLASTY TOTAL / FNB / GELACIO (Left)      Admit Date: 2019  Admit Diagnosis: Primary osteoarthritis of left knee [M17.12]  Osteoarthritis [M19.90]  Osteoarthritis [M19.90]    LAB:    Recent Results (from the past 24 hour(s))   GLUCOSE, POC    Collection Time: 19  8:04 AM   Result Value Ref Range    Glucose (POC) 133 (H) 65 - 100 mg/dL   GLUCOSE, POC    Collection Time: 19  4:55 PM   Result Value Ref Range    Glucose (POC) 221 (H) 65 - 100 mg/dL   GLUCOSE, POC    Collection Time: 19 10:22 PM   Result Value Ref Range    Glucose (POC) 184 (H) 65 - 100 mg/dL   GLUCOSE, POC    Collection Time: 19  6:32 AM   Result Value Ref Range    Glucose (POC) 183 (H) 65 - 100 mg/dL     Vital Signs:    Patient Vitals for the past 8 hrs:   BP Temp Pulse Resp SpO2   19 0525 131/69 97.6 °F (36.4 °C) 81 16 99 %   19 0043 119/67 97.6 °F (36.4 °C) 98 16 98 %     Temp (24hrs), Av.9 °F (36.6 °C), Min:97.6 °F (36.4 °C), Max:98.5 °F (36.9 °C)    Body mass index is 28.37 kg/m².   Pain Control:   Pain Assessment  Pain Scale 1: Numeric (0 - 10)  Pain Intensity 1: 4  Pain Onset 1: years ago  Pain Location 1: Knee  Pain Orientation 1: Left  Pain Description 1: Aching  Pain Intervention(s) 1: Medication (see MAR)    Subjective: Doing well, No complaints, No SOB, No Chest Pain, No Nausea or Vomiting     Objective: Vital Signs are Stable, No Acute Distress, Alert and Oriented, Dressing is Dry,  Neurovascular exam is normal.       PT/OT:         Activity Response: Fairly tolerated  Assistive Device: Walker (comment)        LLE AROM  L Knee Flexion: 91  L Knee Extension: 4       Wieght Bearing Status: WBAT    Meds:  [unfilled]  [unfilled]  [unfilled]    Assessment:   Patient Active Problem List   Diagnosis Code    Osteoarthritis M19.90    Status post total knee replacement, left I59.473             Plan: Continue Physical Cody, Monitor labs, await rehab approval.        Signed By: Vivian Piper MD

## 2019-06-17 NOTE — PROGRESS NOTES
Pt sitting   Up in recliner and watching TV. Denies pain at this time. Ice man not working so exchanged it for a different one. Dressing to left knee clean dry and intact. NV status WNL's reminded pt to call when he is ready to get back into bed.
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
## 2019-06-17 NOTE — PROGRESS NOTES
Problem: Mobility Impaired (Adult and Pediatric)  Goal: *Acute Goals and Plan of Care (Insert Text)  Note:   GOALS (1-4 days):  (1.)Mr. Johanne Carrera will move from supine to sit and sit to supine  in bed with CONTACT GUARD ASSIST. - met 6/16  (2.)Mr. Johanne Carrera will transfer from bed to chair and chair to bed with SUPERVISION using the least restrictive device. (3.)Mr. Johanne Carrera will ambulate with SUPERVISION for 150 feet with the least restrictive device. Met 6/15  (4.)Mr. Johanne Carrera will ambulate up/down 5 steps with bilateral  railing with CONTACT GUARD ASSIST with no device. - goal removed (pt going to rehab)  (5.)Mr. Johanne Carrera will increase left knee ROM to 5°-80°. - met 6/15  ________________________________________________________________________________________________      PHYSICAL THERAPY JOINT CAMP TKA: Daily Note and PM 6/17/2019  INPATIENT: Hospital Day: 6  Payor: [facility] [place] MEDICARE / Plan: [employer] [place] MED[place]/PPO / Product Type: Managed Care Medicare /      NAME/AGE/GENDER: Christiana Araya is a 79 y.o. male   PRIMARY DIAGNOSIS:  Primary osteoarthritis of left knee [M17.12]   Procedure(s) and Anesthesia Type:     * LEFT KNEE ARTHROPLASTY TOTAL / FNB / Skyla Locks - Spinal (Left)  ICD-10: Treatment Diagnosis:    · Pain in Left Knee (M25.562)  · Stiffness of Left Knee, Not elsewhere classified (K65.601)  · Other abnormalities of gait and mobility (R26.89)      ASSESSMENT:     Mr. Johanne Carrera presents S/P L TKA and demonstrates a decline in his premorbid level of function. He presents with decreased L LE strength and ROM, standing balance, functional mobility and TKA awareness. He would benefit from further PT while here to address these issues. Since he lives alone, he plans on trying to go to rehab at [facility]. Pt is making good progress with therapy. He is leaving for rehab. This section established at most recent assessment   PROBLEM LIST (Impairments causing functional limitations):  1.  Decreased Strength  2. Decreased Transfer Abilities  3. Decreased Ambulation Ability/Technique  4. Decreased Balance  5. Increased Pain  6. Decreased Activity Tolerance  7. Increased Fatigue  8. Decreased Flexibility/Joint Mobility  9. Decreased Knowledge of Precautions  10. Decreased [place] with Home Exercise Program   INTERVENTIONS PLANNED: (Benefi ts and precautions of physical therapy have been discussed with the patient.)  1. TKA education   2. bed mobility  3. gait training  4. home exercise program (HEP)  5. Range of Motion: active/assisted/passive  6. Therapeutic Activities  7. therapeutic exercise/strengthening  8. transfer training  9. Group Therapy     TREATMENT PLAN: Frequency/Duration: Follow patient BID for duration of hospital stay to address above goals. Rehabilitation Potential For Stated Goals: Good     RECOMMENDED REHABILITATION/EQUIPMENT: (at time of discharge pending progress): Continue Skilled Therapy and Home Health: Physical Therapy. But seems to be interested in rehab              HISTORY:   History of Present Injury/Illness (Reason for Referral):  S/P L TKA  Past Medical History/Comorbidities:   Mr. Bailey Corado  has a past medical history of Arthritis, BPH (benign prostatic hyperplasia), Diabetes ([address] GERD (gastroesophageal reflux disease), Hepatitis C, HTN (hypertension), Prostate cancer ([facility] [address] Sepsis ([facility] [address] (04/2018), and Status post total knee replacement, left (6/12/2019). Mr. Bailey Corado  has a past surgical history that includes hx other surgical (03/30/2018); hx orthopaedic (1972); hx knee arthroscopy (Left, 1994); hx hemorrhoidectomy (1973); hx colonoscopy (2016); and hx heart catheterization (2001).   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 Bones  2. Joints  3. Muscles Body Functions Affected:  1. Neuromusculoskeletal  2. Movement Related Activities and Participation Affected:  1. General Tasks and Demands  2. Mobility  3. Self Care   Number of elements that affect the Plan of Care: 4+: HIGH COMPLEXITY   CLINICAL PRESENTATION:   Presentation: Stable and uncomplicated: LOW COMPLEXITY   CLINICAL DECISION MAKING:   [employer] 6 Clicks   Basic Mobility Inpatient Short Form  How much difficulty does the patient currently have. .. Unable A Lot A Little None   1. Turning over in bed (including adjusting bedclothes, sheets and blankets)? ? 1   ? 2   ? 3   ? 4   2. Sitting down on and standing up from a chair with arms ( e.g., wheelchair, bedside commode, etc.)   ? 1   ? 2   ? 3   ? 4   3. Moving from lying on back to sitting on the side of the bed?   ? 1   ? 2   ? 3   ? 4   How much help from another person does the patient currently need. .. Total A Lot A Little None   4. Moving to and from a bed to a chair (including a wheelchair)? ? 1   ? 2   ? 3   ? 4   5. Need to walk in hospital room? ? 1   ? 2   ? 3   ? 4   6. Climbing 3-5 steps with a railing? ? 1   ? 2   ? 3   ? 4   © 2007, Trustees of [address] [place] [address] [address] [id number], under license to [employer]. All rights reserved     Score:  Initial: 17 Most Recent: X (Date: -- )    Interpretation of Tool:  Represents activities that are increasingly more difficult (i.e. Bed mobility, Transfers, Gait). Medical Necessity:     · Patient demonstrates good  ·  rehab potential due to higher previous functional level. Reason for Services/Other Comments:  · Patient continues to require present interventions due to patient's inability to perform functional mobility at a safe supervision level   · . Use of outcome tool(s) and clinical judgement create a POC that gives a: Clear prediction of patient's progress: LOW COMPLEXITY            TREATMENT:   (In addition to Assessment/Re-Assessment sessions the following treatments were rendered)     Pre-treatment Symptoms/Complaints doing good   Pain: Initial:   Pain Intensity 1: 0  Post Session:       Therapeutic Exercise: (15 Minutes):  Exercises per grid below to improve mobility, strength and coordination. Required minimal visual, verbal and tactile cues to promote proper body alignment and promote proper body breathing techniques. Progressed range, repetitions and complexity of movement as indicated. Gait Training (8 Minutes):  Gait training to improve and/or restore physical functioning as related to mobility, strength and balance. Ambulated 40 Feet (ft) with Supervision using a Walker, rolling and minimal   related to their stance phase to promote proper body alignment, promote proper body posture and promote proper body mechanics. Date:  6/15 Date:  6/16 Date:  6/17     ACTIVITY/EXERCISE AM PM AM PM AM PM   GROUP THERAPY  ?  x  ?    x  ?    Ankle Pumps 20a 20a 20a 10a 20 20   Quad Sets 20a 20a 20a  20 20   Gluteal Sets 20a 20a 20a  20 20   Hip ABd/ADduction 20a 20a 20a 30a 30 20   Straight Leg Raises 20a 20a 20a 30a 30 30   Knee Slides 20aa 20aa 20aa 30aa 30 30   Short Arc Quads 20a 20a 20a 30a 30 30   Long Arc Quads         Chair Slides 20aa 20aa 20aa 30aa 30 30            B = bilateral; AA = active assistive; A = active; P = passive      Treatment/Session Assessment:     Response to Treatment:  Doing well    Education:  ? Home Exercises  ? Fall Precautions  ? Hip Precautions x D/C Instruction Review  x Knee/Hip Prosthesis Review  ? Walker Management/Safety ? Adaptive Equipment as Needed       Interdisciplinary Collaboration:   o Physical Therapy Assistant    After treatment position/precautions:   o Up in chair  o Bed/Chair-wheels locked  o Bed in low position  o Call light within reach    Compliance with Program/Exercises: Will assess as treatment progresses. No questions. Recommendations/Intent for next treatment session:  Treatment next visit will focus on increasing Mr. Morrisseys independence with bed mobility, transfers, gait training, strength/ROM exercises, modalities for pain, and patient education.       Total Treatment Duration:  PT Patient Time In/Time Out  Time In: 1300  Time Out: 200 [facility] Avjostin Chavez, PTA

## 2019-06-17 NOTE — PROGRESS NOTES
Received call from [address] [facility] with Netta Viera. She has received [facility] Medicare approval for admission today 6-17-19. Orders faxed prior to d/c. Patient plans to go by car.  Brook Leach

## 2019-06-19 LAB
BACTERIA SPEC CULT: NORMAL
SERVICE CMNT-IMP: NORMAL

## 2019-06-19 NOTE — ADT AUTH CERT NOTES
LOC:Acute Adult-General Surgical (6/16/2019) by Scot Moeller  
 
   
Review Status Review Entered In Primary 6/17/2019 11:30  
   
Criteria Review REVIEW SUMMARY 
  
Patient: Desmond Merlos Review Number: [id number] Review Status: In Primary 
  
Condition Specific: Yes 
  
Condition Level Of Care Code: ACUTE Condition Level Of Care Description: Acute 
  
  
OUTCOMES Outcome Type: Primary 
  
  
  
REVIEW DETAILS 
  
Service Date: 06/16/2019 Admit Date: 06/12/2019 Product: [place] Adult Subset: General Surgical 
    (Symptom or finding within 24h) 
  (Excludes PO medications unless noted) Select Day, One: 
            [ ] Post-op Day 3-21, One: 
                [ ] ACUTE, One: 
                ~--Admin, IQ Admin Admin on 06- 11:30 AM--~ 
                Pt is POD 4 for TKA Pt still awaiting auth from payer for SNF rehab. Aquacel dressing to left knee C/D/I; ice main cont Nursing amb with pt in hallway Pain managed with current pain regime Still working with PT BID: using rolling walker and WBAT LLE Glucose at 0804 of 133, at 1655 of 221, and 184 at 2222 
                98.2; 76; 126/78; RR 16 with O2 sat 94% on RA No imaging 
                 
                tylenol 1000mg po Q6H RTC 
                ASA 81mg po Q12H Celebrex 200mg po Q12H Pepcid 10mg po BID Cyanocobalamin 1000mcg po daily Dilaudid 2mg po Q4H PRN x5 Lantus 20units sq QHS Glucophage 500mg po bid Cozaar 50mg po QHS 
                pericolace 2 tabs po daily 
                flomax 0.4mg po QHS 
                 
                regular diet 
                scds and compression stockings Plan: dc on Monday when auth received from payer for SNF for STR 
                 
                 
                 
  
Version: InterQual® 2018.1 [facility]  © 2018 [employer] and/or one of its [place]. All Rights Reserved. CPT only © 2017 American [employer] Association. All Rights Reserved.  
   
LOC:Acute Adult-General Surgical (6/15/2019) by Lelia Ware  
 
   
Review Status Review Entered In Primary 6/17/2019 11:22  
   
Criteria Review REVIEW SUMMARY 
  
Patient: Savana Valle Review Number: [id number] Review Status: In Primary 
  
Condition Specific: Yes 
  
Condition Level Of Care Code: ACUTE Condition Level Of Care Description: Acute 
  
  
OUTCOMES Outcome Type: Primary 
  
  
  
REVIEW DETAILS 
  
Service Date: 06/15/2019 Admit Date: 06/12/2019 Product: [place] Adult Subset: General Surgical 
    (Symptom or finding within 24h) 
  (Excludes PO medications unless noted) Select Day, One: 
            [ ] Post-op Day 3-21, One: 
                [ ] ACUTE, One: 
                ~--Admin, IQ Admin Admin on 06- 11:22 AM--~ 
                Pt is POD 3 for TKA Pt was set to dc to STR/facility accepted. No auth received from insurance yet. Pt's discharge held secondary to no auth in place. Pt's payer is not open weekends/unable to auth. SNF will not accept without auth in place. Wound to knee C/D/I; ice main cont No pain outside expected per surgeon note Still working with PT BID: pt req some cuing but performing ther ex and gain training as per treatment plan WBAT Hgb 10.2 Glucse at 0621 of 153 and at 1159 of 231 
                98.0; 73; 153/83; RR 18 with O2 sat 99% on RA                 No imaging 
                 
                tylenol 1000mg po Q6H RTC 
 ASA 81mg po Q12H Celebrex 200mg po Q12H Dilaudid 2mg po Q4H PRN x5 Lantus 20units sq QHS Glucophage 500mg po bid Cozaar 50mg po QHS 
                pericolace 2 tabs po daily 
                flomax 0.4mg po QHS 
                 
                regular diet 
                scds and compression stockings Plan: dc on Monday when auth received from payer for SNF for STR 
                 
                 
                 
                    [ ] Partial responder, not clinically stable for discharge and requires continued stay, One: 
                        [ ] Routine post-op stay, Both: 
                            [ ] Surgical stay, One: 
                            ~--Admin, IQ Admin Admin on 06- 11:11 AM--~ 
                            Pt is s/p TKA 
                             
                             
                             
  
Version: InterQual® 2018.1 [facility]  © 2018 [employer] and/or one of its [place]. All Rights Reserved. CPT only © 2017 American Medical Association. All Rights Reserved.

## 2019-06-26 ENCOUNTER — HOSPITAL ENCOUNTER (OUTPATIENT)
Dept: PHYSICAL THERAPY | Age: 69
Discharge: HOME | End: 2019-06-26
Payer: MEDICARE

## 2019-06-26 PROCEDURE — 97161 PT EVAL LOW COMPLEX 20 MIN: CPT

## 2019-06-26 NOTE — THERAPY EVALUATION
Arminda Reygideon  : 1950  Primary: [place]  Secondary[facility]  [facility] [address], [place] [address]  [phone]   [phone]          OUTPATIENT PHYSICAL THERAPY:Initial Assessment 2019   ICD-10: Treatment Diagnosis: Difficulty in walking, not elsewhere classified (R26.2); Pain in left knee (T26.146)  Precautions/Allergies:   Patient has no known allergies. TREATMENT PLAN:  Effective Dates: 2019 TO 2019 (60 days). Frequency/Duration: 2 times a week for 60 Day(s) MEDICAL/REFERRING DIAGNOSIS:  Left total Knee   DATE OF ONSET: s/p L TKA 19  REFERRING PHYSICIAN: Ben Myrick NP MD Orders: evaluate and treat  Return MD Appointment: 19     INITIAL ASSESSMENT:  Mr. Charlene Arzate presents in therapy today after L TKA on 19. He is progressing well at this point, demonstrating adequate ambulation, knee ROM and strength for this stage of recovery. I did receive permission from [address] [place] [address] to remove his Zip-Line adhesive today, which was done successfully and without complication. He will benefit from skilled PT in order to reach optimum functional mobility, decreased pain and stiffness, and improve his strength for safe ADL performance.        PROBLEM LIST (Impacting functional limitations):  Decreased Strength  Decreased ADL/Functional Activities  Decreased Ambulation Ability/Technique  Decreased Balance  Increased Pain  Decreased Activity Tolerance  Decreased Flexibility/Joint Mobility  Edema/Girth  Decreased Knowledge of Precautions  Decreased [place] with Home Exercise Program INTERVENTIONS PLANNED: (Treatment may consist of any combination of the following)  Cold  Cryotherapy  Gait Training  Home Exercise Program (HEP)  Manual Therapy  Neuromuscular Re-education/Strengthening  Range of Motion (ROM)  Therapeutic Activites  Therapeutic Exercise/Strengthening     GOALS: (Goals have been discussed and agreed upon with patient.)     SHORT-TERM FUNCTIONAL GOALS: Time Frame: 3 weeks  1. Patient will be compliant with HEP focused on lower extremity strengthening and ROM. 2.  Patient will rate L LE pain no greater than 4/10 for improved tolerance to daily and work duties. DISCHARGE GOALS: Time Frame: 6-8 weeks  1. Patient will be independent with comprehensive HEP focused on continued performance of lower extremity strengthening and ROM activities. 2.  Patient will rate L LE pain no greater than 2/10 and which does not significantly interfere with daily or work duties for return to previous level of function. 3.  Patient will demonstrate near symmetrical bilateral LE AROM for optimum functional mobility with daily and work duties. 4.  Patient will demonstrate near symmetrical bilateral LE strength grades in the above tested planes for optimum performance and safety with daily and work duties. OUTCOME MEASURE:   Tool Used: Lower Extremity Functional Scale (LEFS)  Score:  Initial: 27/80 Most Recent: X/80 (Date: -- )   Interpretation of Score: 20 questions each scored on a 5 point scale with 0 representing \"extreme difficulty or unable to perform\" and 4 representing \"no difficulty\". The lower the score, the greater the functional disability. 80/80 represents no disability. Minimal detectable change is 9 points. MEDICAL NECESSITY:   Patient is expected to demonstrate progress in strength, range of motion and functional technique to improve safety during functional mobility. REASON FOR SERVICES/OTHER COMMENTS:  Patient continues to require skilled intervention due to not reaching long term goals. Total Duration:  PT Patient Time In/Time Out  Time In: 0945  Time Out: 1030    Rehabilitation Potential For Stated Goals: Good  Regarding Chris Freitas's therapy, I certify that the treatment plan above will be carried out by a therapist or under their direction.   Thank you for this referral,  Ileene Schwab Julieta Gray, PT, DPT     Referring Physician Signature: Gonzales Hernandez, JESSIE _______________________________ Date _____________     PAIN/SUBJECTIVE:   Initial: Pain Intensity 1: 5  Pain Location 1: Knee  Pain Orientation 1: Left  Post Session:  4/10   HISTORY:   History of Injury/Illness (Reason for Referral):  Patient underwent L TKA on 6-12-19, with no stated complications during hospital stay. He attended [facility] for continued rehab and is now ready for outpatient therapy. Past Medical History/Comorbidities:   Mr. Rojelio Gibbs  has a past medical history of Arthritis, BPH (benign prostatic hyperplasia), Diabetes ([facility] [address] GERD (gastroesophageal reflux disease), Hepatitis C, HTN (hypertension), Prostate cancer ([facility] [address] Sepsis ([facility] [address] (04/2018), and Status post total knee replacement, left (6/12/2019). Mr. Rojelio Gibbs  has a past surgical history that includes hx other surgical (03/30/2018); hx orthopaedic (1972); hx knee arthroscopy (Left, 1994); hx hemorrhoidectomy (1973); hx colonoscopy (2016); and hx heart catheterization (2001). Social History/Living Environment:     Independent   Prior Level of Function/Work/Activity:  Part-time chen  Personal Factors:          Sex:  male        Age:  79 y.o. Ambulatory/Rehab Services H2 Model Falls Risk Assessment   Risk Factors:       (1)  Gender [Male] Ability to Rise from Chair:       (1)  Pushes up, successful in one attempt   Falls Prevention Plan:       No modifications necessary   Total: (5 or greater = High Risk): 2   ©2010 [facility] [place]. All Rights Reserved. [facility] Patent #[id number]. Federal Law prohibits the replication, distribution or use without written permission from [facility] [employer]   Current Medications:       Current Outpatient Medications:     aspirin delayed-release 81 mg tablet, Take 1 Tab by mouth every twelve (12) hours for 35 days. , Disp: 60 Tab, Rfl: 1    HYDROmorphone (DILAUDID) 2 mg tablet, Take 1 Tab by mouth every four (4) hours as needed for Pain for up to 30 days. Max Daily Amount: 12 mg., Disp: 40 Tab, Rfl: 0    metFORMIN (GLUMETZA ER) 500 mg TG24 24 hour tablet, Take 500 mg by mouth nightly. Indications: type 2 diabetes mellitus, Disp: , Rfl:     insulin glargine (LANTUS,BASAGLAR) 100 unit/mL (3 mL) inpn, 20 Units by SubCUTAneous route nightly., Disp: , Rfl:     losartan (COZAAR) 50 mg tablet, Take 50 mg by mouth nightly., Disp: , Rfl:     cyanocobalamin 1,000 mcg tablet, Take 1,000 mcg by mouth daily. , Disp: , Rfl:     fluticasone propionate (FLONASE ALLERGY RELIEF) 50 mcg/actuation nasal spray, 2 Sprays by Both Nostrils route daily as needed. , Disp: , Rfl:     tamsulosin (FLOMAX) 0.4 mg capsule, Take 0.4 mg by mouth nightly., Disp: , Rfl:     raNITIdine (ZANTAC) 150 mg tablet, Take 150 mg by mouth two (2) times daily as needed. Take / use AM day of surgery  per anesthesia protocols. , Disp: , Rfl:    Date Last Reviewed:  6/26/2019     Number of Personal Factors/Comorbidities that affect the Plan of Care: 0: LOW COMPLEXITY   EXAMINATION:   Observation/Orthostatic Postural Assessment:          Patient ambulates with straight cane, good speed but decreased weight bearing through LLE; incision appears to be healing well with no signs of infection;   Palpation:          Patellar mobility and scar mobility are slightly hypomobile in all directions; no significant tenderness at L calf or posterior capsule   ROM:          L knee AROM: 0-125        R knee AROM: 5-89; R knee PROM: 3-95  Strength:          4-/5 R knee extension and flexion, 4+/5 R ankle DF  Special Tests:          Negative homans for DVT   Body Structures Involved:  Nerves  Bones  Joints  Muscles  Ligaments Body Functions Affected:  Sensory/Pain  Movement Related Activities and Participation Affected:  General Tasks and Demands  Mobility  Self Care  Domestic Life  Interpersonal Interactions and Relationships  Community, Social and [place]   Number of elements (examined above) that affect the Plan of Care: 3: MODERATE COMPLEXITY   CLINICAL PRESENTATION:   Presentation: Stable and uncomplicated: LOW COMPLEXITY   CLINICAL DECISION MAKING:   Use of outcome tool(s) and clinical judgement create a POC that gives a: Clear prediction of patient's progress: LOW COMPLEXITY

## 2019-07-01 ENCOUNTER — HOSPITAL ENCOUNTER (OUTPATIENT)
Dept: PHYSICAL THERAPY | Age: 69
Discharge: HOME | End: 2019-07-01
Payer: MEDICARE

## 2019-07-01 PROCEDURE — 97016 VASOPNEUMATIC DEVICE THERAPY: CPT

## 2019-07-01 PROCEDURE — 97110 THERAPEUTIC EXERCISES: CPT

## 2019-07-01 NOTE — PROGRESS NOTES
Blade Becerra  : 1950  [place]  Secondary: [address] [address]  [place] , [address], [place] [address]  [phone]   [phone]        OUTPATIENT PHYSICAL THERAPY: Daily Treatment Note 2019  Visit Count:  2    ICD-10: Treatment Diagnosis: Difficulty in walking, not elsewhere classified (R26.2); Pain in left knee (M41.052)  Precautions/Allergies:   Patient has no known allergies. TREATMENT PLAN:  Effective Dates: 2019 TO 2019 (60 days). Frequency/Duration: 2 times a week for 60 Day(s) MEDICAL/REFERRING DIAGNOSIS:  No admission diagnoses are documented for this encounter. DATE OF ONSET: s/p L TKA 19  REFERRING PHYSICIAN: Albert Burrell NP MD Orders: evaluate and treat  Return MD Appointment: 19       Pre-treatment Symptoms/Complaints:  Patient states he is working on his walking, and motion at home, things going well so far. Pain: Initial: Pain Intensity 1: 5  Pain Location 1: Knee  Post Session:  4/10   Medications Last Reviewed:  2019  Updated Objective Findings:  None Today  TREATMENT:     THERAPEUTIC EXERCISE: (30 minutes):  Exercises per grid below to improve mobility and strength. Required minimal verbal cues to promote proper body alignment, promote proper body posture and promote proper body mechanics. Progressed complexity of movement as indicated. Date:  19 Date:   Date:     Activity/Exercise Parameters Parameters Parameters   Stepper   10 minutes  Level 1     Calf stretch   Incline 3x30 seconds     Knee flexion and extension over-pressure    Manual by clinician in both planes  20 second hold at available end range, patient supine, x10 each     SAQ   --                               MODALITIES: (10 minutes):      *  Cold Pack Therapy in order to provide analgesia and reduce inflammation and edema.    Patient received vasopneumatic compression via GameReady to L knee, patient supine with LEs supported under wedge, medium compression, 46 degrees      Treatment/Session Summary:    Response to Treatment:  Patient is demonstrating very good active and passive ROM at this point in his rehab following TKA. He was able achieve 1-2 degrees of passive extension and  of flexion. Communication/Consultation:  None today  Equipment provided today:  None today  Recommendations/Intent for next treatment session: Next visit will focus on increasing motion and challenging LE strength, TKA guidelines.     Total Treatment Billable Duration:  40 minutes - 30 minutes therapeutic exercise and 10 minutes vasopneumatic compression   PT Patient Time In/Time Out  Time In: 1115  Time Out: [address], PT, DPT    Future Appointments   Date Time Provider Maira Correa   7/1/2019 11:15 AM Dakota Parisi PT, DPT [facility]   7/3/2019 11:15 AM Dakota Parisi PT, DPT [facility]   7/8/2019 11:15 AM Dakota Parisi PT, DPT [facility]   7/9/2019 11:15 AM Dakota Parisi PT, DPT [facility]   7/10/2019 11:15 AM Bruna Pizano, PT, DPT [facility]

## 2019-07-03 ENCOUNTER — HOSPITAL ENCOUNTER (OUTPATIENT)
Dept: PHYSICAL THERAPY | Age: 69
Discharge: HOME | End: 2019-07-03
Payer: MEDICARE

## 2019-07-03 PROCEDURE — 97110 THERAPEUTIC EXERCISES: CPT

## 2019-07-03 PROCEDURE — 97016 VASOPNEUMATIC DEVICE THERAPY: CPT

## 2019-07-03 NOTE — PROGRESS NOTES
Jorje Rondon  : 1950  Primary: [place]  Secondary: [address] [address]  [place], [address], [place] [address]  [phone]   [phone]        OUTPATIENT PHYSICAL THERAPY: Daily Treatment Note 7/3/2019  Visit Count:  3    ICD-10: Treatment Diagnosis: Difficulty in walking, not elsewhere classified (R26.2); Pain in left knee (N60.136)  Precautions/Allergies:   Patient has no known allergies. TREATMENT PLAN:  Effective Dates: 2019 TO 2019 (60 days). Frequency/Duration: 2 times a week for 60 Day(s) MEDICAL/REFERRING DIAGNOSIS:  No admission diagnoses are documented for this encounter. DATE OF ONSET: s/p L TKA 19  REFERRING PHYSICIAN: Joanne Camacho NP MD Orders: evaluate and treat  Return MD Appointment: 19       Pre-treatment Symptoms/Complaints:  Patient states he is doing well, walking without cane today, with slight decrease in weight bearing through LLE and decreased TKE    Pain: Initial: Pain Intensity 1: 4  Pain Location 1: Knee  Pain Orientation 1: Left  Post Session:  4/10   Medications Last Reviewed:  7/3/2019  Updated Objective Findings:  None Today  TREATMENT:     THERAPEUTIC EXERCISE: (30 minutes):  Exercises per grid below to improve mobility and strength. Required minimal verbal cues to promote proper body alignment, promote proper body posture and promote proper body mechanics. Progressed complexity of movement as indicated.      Date:  19 Date:  7-3-19 Date:     Activity/Exercise Parameters Parameters Parameters   Stepper   10 minutes  Level 1 10 minutes  Level 2    Calf stretch   Incline 3x30 seconds Incline 4x10 seconds    Knee flexion and extension over-pressure    Manual by clinician in both planes  20 second hold at available end range, patient supine, x10 each Manual by clinician in both planes  20 second hold at available end range, patient supine, x10 each    SAQ   -- 3 lb  2x10    SLR    3 lb  2x10                      MODALITIES: (10 minutes):      *  Cold Pack Therapy in order to provide analgesia and reduce inflammation and edema. Patient received vasopneumatic compression via GameReady to L knee, patient supine with LEs supported under wedge, medium compression, 46 degrees      Treatment/Session Summary:    Response to Treatment:  Patient is demonstrating steady progress in motion, strength, and functional mobility. 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 Communication/Consultation:  None today  Equipment provided today:  None today  Recommendations/Intent for next treatment session: Next visit will focus on increasing motion and challenging LE strength, TKA guidelines.     Total Treatment Billable Duration:  40 minutes - 30 minutes therapeutic exercise and 10 minutes vasopneumatic compression   PT Patient Time In/Time Out  Time In: 1115  Time Out: [address], PT, DPT    Future Appointments   Date Time Provider Maira Correa   7/8/2019 11:15 AM Brenda Lazo PT, DPT [facility]   7/9/2019 11:15 AM Brenda Lazo PT, DPT [facility]   7/10/2019 11:15 AM Brenda Lazo, PT, DPT [facility]

## 2019-07-08 ENCOUNTER — HOSPITAL ENCOUNTER (OUTPATIENT)
Dept: PHYSICAL THERAPY | Age: 69
Discharge: HOME | End: 2019-07-08
Payer: MEDICARE

## 2019-07-08 PROCEDURE — 97110 THERAPEUTIC EXERCISES: CPT

## 2019-07-08 PROCEDURE — 97016 VASOPNEUMATIC DEVICE THERAPY: CPT

## 2019-07-08 NOTE — PROGRESS NOTES
Levora Mater  : 1950  [place]  Secondary: [address] [address] at [address]  [place] , [address], [place] [address]  [phone]   [phone]        OUTPATIENT PHYSICAL THERAPY: Daily Treatment Note 2019  Visit Count:  4    ICD-10: Treatment Diagnosis: Difficulty in walking, not elsewhere classified (R26.2); Pain in left knee (P95.271)  Precautions/Allergies:   Patient has no known allergies. TREATMENT PLAN:  Effective Dates: 2019 TO 2019 (60 days). Frequency/Duration: 2 times a week for 60 Day(s) MEDICAL/REFERRING DIAGNOSIS:  No admission diagnoses are documented for this encounter. DATE OF ONSET: s/p L TKA 19  REFERRING PHYSICIAN: Alhaji Walters NP MD Orders: evaluate and treat  Return MD Appointment: 19       Pre-treatment Symptoms/Complaints:  Patient states he is doing well, walking without assistive device with improved gait quality. Pain: Initial: Pain Intensity 1: 4  Pain Location 1: Knee  Pain Orientation 1: Left  Post Session:  4/10   Medications Last Reviewed:  2019  Updated Objective Findings:  None Today  TREATMENT:     THERAPEUTIC EXERCISE: (30 minutes):  Exercises per grid below to improve mobility and strength. Required minimal verbal cues to promote proper body alignment, promote proper body posture and promote proper body mechanics. Progressed complexity of movement as indicated.      Date:  19 Date:  7-3-19 Date:  19   Activity/Exercise Parameters Parameters Parameters   Stepper   10 minutes  Level 1 10 minutes  Level 2 11 minutes  Level 2   Calf stretch   Incline 3x30 seconds Incline 4x10 seconds Incline 5x20 seconds   Knee flexion and extension over-pressure    Manual by clinician in both planes  20 second hold at available end range, patient supine, x10 each Manual by clinician in both planes  20 second hold at available end range, patient supine, x10 each Manual by clinician in both planes  20 second hold at available end range, patient supine, x10 each   SAQ   -- 3 lb  2x10 3 lb  2x10   SLR    3 lb  2x10 3 lb  2x10                     MODALITIES: (10 minutes):      *  Cold Pack Therapy in order to provide analgesia and reduce inflammation and edema. Patient received vasopneumatic compression via GameReady to L knee, patient supine with LEs supported under wedge, medium compression, 46 degrees      Treatment/Session Summary:    Response to Treatment:  Patient is demonstrating steady progress in motion, strength, and functional mobility. Communication/Consultation:  None today  Equipment provided today:  None today  Recommendations/Intent for next treatment session: Next visit will focus on increasing motion and challenging LE strength, TKA guidelines.     Total Treatment Billable Duration:  40 minutes - 30 minutes therapeutic exercise and 10 minutes vasopneumatic compression   PT Patient Time In/Time Out  Time In: 1115  Time Out: [address], PT, DPT    Future Appointments   Date Time Provider Maira Correa   7/9/2019 11:15 AM Vladimir Botello, PT, DPT [facility]   7/10/2019 11:15 AM Rachel Yang, PT, DPT [facility]

## 2019-07-09 ENCOUNTER — HOSPITAL ENCOUNTER (OUTPATIENT)
Dept: PHYSICAL THERAPY | Age: 69
Discharge: HOME | End: 2019-07-09
Payer: MEDICARE

## 2019-07-09 PROCEDURE — 97110 THERAPEUTIC EXERCISES: CPT

## 2019-07-09 PROCEDURE — 97016 VASOPNEUMATIC DEVICE THERAPY: CPT

## 2019-07-09 NOTE — PROGRESS NOTES
Liliam Rosales  : 1950  Primary: [place]  Secondary: [address] [facility]  [place] , [address], [place] [address]  [phone]   [phone]        OUTPATIENT PHYSICAL THERAPY: Daily Treatment Note 2019  Visit Count:  5    ICD-10: Treatment Diagnosis: Difficulty in walking, not elsewhere classified (R26.2); Pain in left knee (I44.529)  Precautions/Allergies:   Patient has no known allergies. TREATMENT PLAN:  Effective Dates: 2019 TO 2019 (60 days). Frequency/Duration: 2 times a week for 60 Day(s) MEDICAL/REFERRING DIAGNOSIS:  No admission diagnoses are documented for this encounter. DATE OF ONSET: s/p L TKA 19  REFERRING PHYSICIAN: Marques Aguila NP MD Orders: evaluate and treat  Return MD Appointment: 19       Pre-treatment Symptoms/Complaints:  Patient states he is doing well, walking without assistive device with improved gait quality. Pain: Initial: Pain Intensity 1: 4  Pain Location 1: Knee  Pain Orientation 1: Left  Post Session:  4/10   Medications Last Reviewed:  2019  Updated Objective Findings:  L knee AROM: 2-110  TREATMENT:     THERAPEUTIC EXERCISE: (30 minutes):  Exercises per grid below to improve mobility and strength. Required minimal verbal cues to promote proper body alignment, promote proper body posture and promote proper body mechanics. Progressed complexity of movement as indicated.      Date:  19 Date:  7-3-19 Date:  19   Activity/Exercise Parameters Parameters Parameters   Stepper   10 minutes  Level 1 10 minutes  Level 2 11 minutes  Level 2   Calf stretch   Incline 3x30 seconds Incline 4x10 seconds Incline 5x20 seconds   Knee flexion and extension over-pressure    Manual by clinician in both planes  20 second hold at available end range, patient supine, x10 each Manual by clinician in both planes  20 second hold at available end range, patient supine, x10 each Manual by clinician in both planes  20 second hold at available end range, patient supine, x10 each   SAQ   -- 3 lb  2x10 3 lb  2x10   SLR    3 lb  2x10 3 lb  2x10                     MODALITIES: (10 minutes):      *  Cold Pack Therapy in order to provide analgesia and reduce inflammation and edema. Patient received vasopneumatic compression via GameReady to L knee, patient supine with LEs supported under wedge, medium compression, 46 degrees      Treatment/Session Summary:    Response to Treatment:  Patient is demonstrating steady progress in motion, strength, and functional mobility. His motion is roughly 2-110 at L knee. Communication/Consultation:  None today  Equipment provided today:  None today  Recommendations/Intent for next treatment session: Next visit will focus on increasing motion and challenging LE strength, TKA guidelines.     Total Treatment Billable Duration:  40 minutes - 30 minutes therapeutic exercise and 10 minutes vasopneumatic compression   PT Patient Time In/Time Out  Time In: 1115  Time Out: [address], PT, DPT    Future Appointments   Date Time Provider Maira Madeline   7/10/2019 11:15 AM Frankie Steve, PT, DPT [facility]

## 2019-07-10 ENCOUNTER — HOSPITAL ENCOUNTER (OUTPATIENT)
Dept: PHYSICAL THERAPY | Age: 69
Discharge: HOME | End: 2019-07-10
Payer: MEDICARE

## 2019-07-10 PROCEDURE — 97016 VASOPNEUMATIC DEVICE THERAPY: CPT

## 2019-07-10 PROCEDURE — 97110 THERAPEUTIC EXERCISES: CPT

## 2019-07-10 NOTE — PROGRESS NOTES
Whitney Finley  : 1950  Primary: [place]  Secondary: [address] [address]  [place], [address], [place] [address]  [phone]   [phone]        OUTPATIENT PHYSICAL THERAPY: Daily Treatment Note 7/10/2019  Visit Count:  6    ICD-10: Treatment Diagnosis: Difficulty in walking, not elsewhere classified (R26.2); Pain in left knee (E95.621)  Precautions/Allergies:   Patient has no known allergies. TREATMENT PLAN:  Effective Dates: 2019 TO 2019 (60 days). Frequency/Duration: 2 times a week for 60 Day(s) MEDICAL/REFERRING DIAGNOSIS:  No admission diagnoses are documented for this encounter. DATE OF ONSET: s/p L TKA 19  REFERRING PHYSICIAN: Milton Alarcon NP MD Orders: evaluate and treat  Return MD Appointment: 19       Pre-treatment Symptoms/Complaints:  Patient states he continues to see improvement in his functional mobility and ADL performance. Pain: Initial: Pain Intensity 1: 3  Pain Location 1: Knee  Pain Orientation 1: Left  Post Session:  4/10   Medications Last Reviewed:  7/10/2019  Updated Objective Findings:  L knee AROM: 2-110  TREATMENT:     THERAPEUTIC EXERCISE: (30 minutes):  Exercises per grid below to improve mobility and strength. Required minimal verbal cues to promote proper body alignment, promote proper body posture and promote proper body mechanics. Progressed complexity of movement as indicated.      Date:  19 Date:  7-3-19 Date:  19 Date:  7-10-10   Activity/Exercise Parameters Parameters Parameters    Stepper   10 minutes  Level 1 10 minutes  Level 2 11 minutes  Level 2 11 minutes  Level 2   Calf stretch   Incline 3x30 seconds Incline 4x10 seconds Incline 5x20 seconds Incline 5x20 seconds   Knee flexion and extension over-pressure    Manual by clinician in both planes  20 second hold at available end range, patient supine, x10 each Manual by clinician in both planes  20 second hold at available end range, patient supine, x10 each Manual by clinician in both planes  20 second hold at available end range, patient supine, x10 each Manual by clinician in both planes  20 second hold at available end range, patient supine, x10 each   SAQ   -- 3 lb  2x10 3 lb  2x10    SLR    3 lb  2x10 3 lb  2x10                        MODALITIES: (10 minutes):      *  Cold Pack Therapy in order to provide analgesia and reduce inflammation and edema. Patient received vasopneumatic compression via GameReady to L knee, patient supine with LEs supported under wedge, medium compression, 46 degrees      Treatment/Session Summary:    Response to Treatment:  Patient continues to show good improvements in strength, motion and mobility. Communication/Consultation:  None today  Equipment provided today:  None today  Recommendations/Intent for next treatment session: Next visit will focus on increasing motion and challenging LE strength, TKA guidelines.     Total Treatment Billable Duration:  40 minutes - 30 minutes therapeutic exercise and 10 minutes vasopneumatic compression   PT Patient Time In/Time Out  Time In: 1115  Time Out: [address], PT, DPT    Future Appointments   Date Time Provider Maira Correa   7/10/2019 11:15 AM David Flores, PT, DPT [facility]

## 2019-07-16 ENCOUNTER — HOSPITAL ENCOUNTER (OUTPATIENT)
Dept: PHYSICAL THERAPY | Age: 69
Discharge: HOME | End: 2019-07-16
Payer: MEDICARE

## 2019-07-16 PROCEDURE — 97016 VASOPNEUMATIC DEVICE THERAPY: CPT

## 2019-07-16 PROCEDURE — 97110 THERAPEUTIC EXERCISES: CPT

## 2019-07-16 NOTE — PROGRESS NOTES
Silvia Calloway  : 1950  [place]  Secondary: [employer] [address] at [facility]  [address] [place] [address]  [phone]   [phone]        OUTPATIENT PHYSICAL THERAPY: Daily Treatment Note 2019  Visit Count:  7    ICD-10: Treatment Diagnosis: Difficulty in walking, not elsewhere classified (R26.2); Pain in left knee (S21.192)  Precautions/Allergies:   Patient has no known allergies. TREATMENT PLAN:  Effective Dates: 2019 TO 2019 (60 days). Frequency/Duration: 2 times a week for 60 Day(s) MEDICAL/REFERRING DIAGNOSIS:  No admission diagnoses are documented for this encounter. DATE OF ONSET: s/p L TKA 19  REFERRING PHYSICIAN: Basia Rouse NP MD Orders: evaluate and treat  Return MD Appointment: 19       Pre-treatment Symptoms/Complaints:  Patient states he feels like his progress has slowed with his knee extension motion. He does return to work this week, and overall, sees improvements in functional mobility. Pain: Initial: Pain Intensity 1: 3  Pain Location 1: Knee  Pain Orientation 1: Left  Post Session:  4/10   Medications Last Reviewed:  2019  Updated Objective Findings:  L knee AROM: 2-110  TREATMENT:     THERAPEUTIC EXERCISE: (30 minutes):  Exercises per grid below to improve mobility and strength. Required minimal verbal cues to promote proper body alignment, promote proper body posture and promote proper body mechanics. Progressed complexity of movement as indicated.      Date:  19 Date:  7-3-19 Date:  19 Date:  7-10-10 Date  19   Activity/Exercise Parameters Parameters Parameters     Airdyne       10 minutes  Seat 6   Stepper   10 minutes  Level 1 10 minutes  Level 2 11 minutes  Level 2 11 minutes  Level 2    Calf stretch   Incline 3x30 seconds Incline 4x10 seconds Incline 5x20 seconds Incline 5x20 seconds Incline 5x20 seconds   Knee flexion and extension over-pressure    Manual by clinician in both planes  20 second hold at available end range, patient supine, x10 each Manual by clinician in both planes  20 second hold at available end range, patient supine, x10 each Manual by clinician in both planes  20 second hold at available end range, patient supine, x10 each Manual by clinician in both planes  20 second hold at available end range, patient supine, x10 each Manual by clinician in both planes  20 second hold at available end range, patient supine for extension and prone for flexion, x10 each   SAQ   -- 3 lb  2x10 3 lb  2x10     SLR    3 lb  2x10 3 lb  2x10  3 lb  3x10   Step up         6 inch steps  5x2 steps leading with L               MODALITIES: (10 minutes):      *  Cold Pack Therapy in order to provide analgesia and reduce inflammation and edema. Patient received vasopneumatic compression via GameReady to L knee, patient supine with LEs supported under wedge, medium compression, 46 degrees      Treatment/Session Summary:    · Response to Treatment:  Patient does demonstrate roughly 5-7 degrees of knee extension deficit in supine, but able to achieve roughly 1-2 degrees with passive extension. · Communication/Consultation:  None today  · Equipment provided today:  None today  · Recommendations/Intent for next treatment session: Next visit will focus on increasing motion and challenging LE strength, TKA guidelines.     Total Treatment Billable Duration:  40 minutes - 30 minutes therapeutic exercise and 10 minutes vasopneumatic compression   PT Patient Time In/Time Out  Time In: 1300  Time Out: 1345  Graciela Sanabria, PT, DPT    Future Appointments   Date Time Provider Maira Correa   7/18/2019 11:00 AM Patience Forte, PT, DPT [facility]

## 2019-07-18 ENCOUNTER — HOSPITAL ENCOUNTER (OUTPATIENT)
Dept: PHYSICAL THERAPY | Age: 69
Discharge: HOME | End: 2019-07-18
Payer: MEDICARE

## 2019-07-18 PROCEDURE — 97110 THERAPEUTIC EXERCISES: CPT

## 2019-07-18 PROCEDURE — 97016 VASOPNEUMATIC DEVICE THERAPY: CPT

## 2019-07-18 NOTE — PROGRESS NOTES
Harshil Thomson  : 1950  [place]  Secondary: [employer] Generic [address] at [facility]  [address] [place] [address]  [phone]   [phone]        OUTPATIENT PHYSICAL THERAPY: Daily Treatment Note 2019  Visit Count:  8    ICD-10: Treatment Diagnosis: Difficulty in walking, not elsewhere classified (R26.2); Pain in left knee (O13.643)  Precautions/Allergies:   Patient has no known allergies. TREATMENT PLAN:  Effective Dates: 2019 TO 2019 (60 days). Frequency/Duration: 2 times a week for 60 Day(s) MEDICAL/REFERRING DIAGNOSIS:  No admission diagnoses are documented for this encounter. DATE OF ONSET: s/p L TKA 19  REFERRING PHYSICIAN: Moshe Justin NP MD Orders: evaluate and treat  Return MD Appointment: 19       Pre-treatment Symptoms/Complaints:  Patient states his knee is more stiff today, but has worked this morning, and will return to work following today's session. Overall, he is still pleased with his progress. Pain: Initial:    Post Session:  4/10   Medications Last Reviewed:  2019  Updated Objective Findings:  L knee AROM: 2-110  TREATMENT:     THERAPEUTIC EXERCISE: (45 minutes):  Exercises per grid below to improve mobility and strength. Required minimal verbal cues to promote proper body alignment, promote proper body posture and promote proper body mechanics. Progressed complexity of movement as indicated.      Date:  19 Date:  7-3-19 Date:  19 Date:  7-10-10 Date  19 Date  19   Activity/Exercise Parameters Parameters Parameters      Airdyne       10 minutes  Seat 6 8 minutes  Seat 6   Stepper   10 minutes  Level 1 10 minutes  Level 2 11 minutes  Level 2 11 minutes  Level 2     Calf stretch   Incline 3x30 seconds Incline 4x10 seconds Incline 5x20 seconds Incline 5x20 seconds Incline 5x20 seconds Incline 5x20 seconds   Knee flexion and extension over-pressure Manual by clinician in both planes  20 second hold at available end range, patient supine, x10 each Manual by clinician in both planes  20 second hold at available end range, patient supine, x10 each Manual by clinician in both planes  20 second hold at available end range, patient supine, x10 each Manual by clinician in both planes  20 second hold at available end range, patient supine, x10 each Manual by clinician in both planes  20 second hold at available end range, patient supine for extension and prone for flexion, x10 each Manual by clinician in both planes  20 second hold at available end range, patient supine for extension and prone for flexion, x10 each   SAQ   -- 3 lb  2x10 3 lb  2x10      SLR    3 lb  2x10 3 lb  2x10  3 lb  3x10    Step up         6 inch steps  5x2 steps leading with L    Machine leg curl        60 lb  2x10   Machine leg press        85 lb  2x10 leg press  85 lb  2x10 Calf press     MODALITIES: (10 minutes):      *  Cold Pack Therapy in order to provide analgesia and reduce inflammation and edema. Patient received vasopneumatic compression via GameReady to L knee, patient supine with LEs supported under wedge, medium compression, 46 degrees      Treatment/Session Summary:    · Response to Treatment:  Patient did well with increased focused on LE strengthening. Ambulation looked much improved today with much less noticeable limp and trunk sway. · Communication/Consultation:  None today  · Equipment provided today:  None today  · Recommendations/Intent for next treatment session: Next visit will focus on increasing motion and challenging LE strength, TKA guidelines.     Total Treatment Billable Duration:  55 minutes - 45 minutes therapeutic exercise and 10 minutes vasopneumatic compression   PT Patient Time In/Time Out  Time In: Rubi Fry, PT, DPT    Future Appointments   Date Time Provider Maira Correa   7/22/2019 11:00 AM Mikaela Colin, PT, DPT [facility] 7/24/2019 11:00 AM Ana Coil, PT, DPT [facility]   7/29/2019 11:00 AM Ana Coil, PT, DPT [facility]   7/31/2019 10:15 AM Ana Coil, PT, DPT [facility]   8/5/2019 11:00 AM Ana Coil, PT, DPT [facility]   8/7/2019 11:00 AM Ana Coil, PT, DPT [facility]   8/12/2019 11:00 AM Ana Coil, PT, DPT [facility]

## 2019-07-22 ENCOUNTER — HOSPITAL ENCOUNTER (OUTPATIENT)
Dept: PHYSICAL THERAPY | Age: 69
Discharge: HOME | End: 2019-07-22
Payer: MEDICARE

## 2019-07-22 PROCEDURE — 97110 THERAPEUTIC EXERCISES: CPT

## 2019-07-22 PROCEDURE — 97016 VASOPNEUMATIC DEVICE THERAPY: CPT

## 2019-07-22 NOTE — PROGRESS NOTES
Radames Morgan  : 1950  [place]  Secondary: [employer] Generic [address] at [facility]  [address] [place] [address]  [phone]   [phone]        OUTPATIENT PHYSICAL THERAPY: Daily Treatment Note 2019  Visit Count:  9    ICD-10: Treatment Diagnosis: Difficulty in walking, not elsewhere classified (R26.2); Pain in left knee (D08.992)  Precautions/Allergies:   Patient has no known allergies. TREATMENT PLAN:  Effective Dates: 2019 TO 2019 (60 days). Frequency/Duration: 2 times a week for 60 Day(s) MEDICAL/REFERRING DIAGNOSIS:  No admission diagnoses are documented for this encounter. DATE OF ONSET: s/p L TKA 19  REFERRING PHYSICIAN: Shane Yousif NP MD Orders: evaluate and treat  Return MD Appointment: 19       Pre-treatment Symptoms/Complaints:  Patient states his biggest issue is still stiffness at the knee in the early morning. Pain: Initial: Pain Intensity 1: 3  Pain Location 1: Knee  Pain Orientation 1: Left  Post Session:  4/10   Medications Last Reviewed:  2019  Updated Objective Findings:  L knee AROM: 2-110  TREATMENT:     THERAPEUTIC EXERCISE: (30 minutes):  Exercises per grid below to improve mobility and strength. Required minimal verbal cues to promote proper body alignment, promote proper body posture and promote proper body mechanics. Progressed complexity of movement as indicated.      Date:  19 Date:  7-10-10 Date  19 Date  19 Date  19   Activity/Exercise Parameters       Airdyne     10 minutes  Seat 6 8 minutes  Seat 6 10 minutes  Seat 6   Stepper   11 minutes  Level 2 11 minutes  Level 2      Calf stretch   Incline 5x20 seconds Incline 5x20 seconds Incline 5x20 seconds Incline 5x20 seconds Incline 5x20 seconds   Knee flexion and extension over-pressure    Manual by clinician in both planes  20 second hold at available end range, patient supine, x10 each Manual by clinician in both planes  20 second hold at available end range, patient supine, x10 each Manual by clinician in both planes  20 second hold at available end range, patient supine for extension and prone for flexion, x10 each Manual by clinician in both planes  20 second hold at available end range, patient supine for extension and prone for flexion, x10 each Manual by clinician in both planes  20 second hold at available end range, patient supine for extension and prone for flexion, x10 each   SAQ   3 lb  2x10       SLR   3 lb  2x10  3 lb  3x10     Step up       6 inch steps  5x2 steps leading with L     Machine leg curl      60 lb  2x10 75 lb   B/L 2x10  45 lb  LLE 2x10   Machine leg press      85 lb  2x10 leg press  85 lb  2x10 Calf press 85 lb  2x10 leg press  85 lb  2x10 Calf press     MODALITIES: (10 minutes):      *  Cold Pack Therapy in order to provide analgesia and reduce inflammation and edema. Patient received vasopneumatic compression via GameReady to L knee, patient supine with LEs supported under wedge, medium compression, 46 degrees      Treatment/Session Summary:    · Response to Treatment:  Patient is demonstrating steadily improving gait and LE strength. His knee extension is still lagging roughly 3-5 degrees of full extension, so we discussed increasing focus on this area at home. .     · Communication/Consultation:  None today  · Equipment provided today:  None today  · Recommendations/Intent for next treatment session: Next visit will focus on increasing motion and challenging LE strength, TKA guidelines.     Total Treatment Billable Duration:  40 minutes - 30 minutes therapeutic exercise and 10 minutes vasopneumatic compression   PT Patient Time In/Time Out  Time In: 1100  Time Out: [address] PT, DPT    Future Appointments   Date Time Provider Maira Correa   7/24/2019 11:00 AM Brody Gallegos, PT, DPT [facility]   7/29/2019 11:00 AM Brdoy Gallegos, PT, DPT [facility]   7/31/2019 10:15 AM Carla Blackwell, PT, DPT [facility]   8/5/2019 11:00 AM Carla Blackwell, PT, DPT [facility]   8/7/2019 11:00 AM Carla Blackwell, PT, DPT [facility]   8/12/2019 11:00 AM Gwen Galvan, PT, DPT [facility]

## 2019-07-24 ENCOUNTER — HOSPITAL ENCOUNTER (OUTPATIENT)
Dept: PHYSICAL THERAPY | Age: 69
Discharge: HOME | End: 2019-07-24
Payer: MEDICARE

## 2019-07-24 PROCEDURE — 97016 VASOPNEUMATIC DEVICE THERAPY: CPT

## 2019-07-24 PROCEDURE — 97110 THERAPEUTIC EXERCISES: CPT

## 2019-07-24 NOTE — PROGRESS NOTES
Kaleigh Suero  : 1950  Primary: [place]  Secondary:  [facility]  [address] , [address], [place]  [phone]   [phone]          OUTPATIENT PHYSICAL THERAPY:Progress Report 2019   ICD-10: Treatment Diagnosis: Difficulty in walking, not elsewhere classified (R26.2); Pain in left knee (F20.139)  Precautions/Allergies:   Patient has no known allergies. TREATMENT PLAN:  Effective Dates: 2019 TO 2019 (60 days). Frequency/Duration: 2 times a week for 60 Day(s) MEDICAL/REFERRING DIAGNOSIS:  Difficulty in walking, not elsewhere classified [R26.2]  Pain in left knee [M25.562]   DATE OF ONSET: s/p L TKA 19  REFERRING PHYSICIAN: Nakia Dozier NP MD Orders: evaluate and treat  Return MD Appointment: 4 weeks     ASSESSMENT:  Mr. Wero Snell initiated PT on 19 and has attended 10 out of 10 therapy sessions following L TKA on 19. He is demonstrating steady progress in functional mobility, LE strength, and knee ROM. His LEFS disability score improved from an initial 27/80 to 57/80. He does still report significant stiffness in the L knee, especially with knee extension. He will benefit from an additional 8 therapy sessions in order to reach optimum functional mobility, decreased pain and stiffness, and improve his strength for safe ADL performance. PROBLEM LIST (Impacting functional limitations):  1. Decreased Strength  2. Decreased ADL/Functional Activities  3. Decreased Ambulation Ability/Technique  4. Decreased Balance  5. Increased Pain  6. Decreased Activity Tolerance  7. Decreased Flexibility/Joint Mobility  8. Edema/Girth  9. Decreased Knowledge of Precautions  10. Decreased [place] with Home Exercise Program INTERVENTIONS PLANNED: (Treatment may consist of any combination of the following)  1. Cold  2. Cryotherapy  3. Gait Training  4. Home Exercise Program (HEP)  5.  Manual Therapy  6. Neuromuscular Re-education/Strengthening  7. Range of Motion (ROM)  8. Therapeutic Activites  9. Therapeutic Exercise/Strengthening     GOALS: (Goals have been discussed and agreed upon with patient.)     SHORT-TERM FUNCTIONAL GOALS: Time Frame: 3 weeks  1. Patient will be compliant with HEP focused on lower extremity strengthening and ROM. GOAL MET 7/24/2019  2. Patient will rate L LE pain no greater than 4/10 for improved tolerance to daily and work duties. GOAL MET 7/24/2019    DISCHARGE GOALS: Time Frame: 6-8 weeks   All long term goals ongoing 7/24/2019  1. Patient will be independent with comprehensive HEP focused on continued performance of lower extremity strengthening and ROM activities. 2.  Patient will rate L LE pain no greater than 2/10 and which does not significantly interfere with daily or work duties for return to previous level of function. 3.  Patient will demonstrate near symmetrical bilateral LE AROM for optimum functional mobility with daily and work duties. 4.  Patient will demonstrate near symmetrical bilateral LE strength grades in the above tested planes for optimum performance and safety with daily and work duties. OUTCOME MEASURE:   Tool Used: Lower Extremity Functional Scale (LEFS)  Score:  Initial: 27/80 Most Recent: 57/80 (Date: 7-24-19 )   Interpretation of Score: 20 questions each scored on a 5 point scale with 0 representing \"extreme difficulty or unable to perform\" and 4 representing \"no difficulty\". The lower the score, the greater the functional disability. 80/80 represents no disability. Minimal detectable change is 9 points. MEDICAL NECESSITY:   · Patient is expected to demonstrate progress in strength, range of motion and functional technique to improve safety during functional mobility. REASON FOR SERVICES/OTHER COMMENTS:  · Patient continues to require skilled intervention due to not reaching long term goals.   Total Duration:  PT Patient Time In/Time Out  Time In: 1100  Time Out: 1145    Rehabilitation Potential For Stated Goals: Good  Regarding Chris Freitas's therapy, I certify that the treatment plan above will be carried out by a therapist or under their direction. Thank you for this referral,  Alesha Corcoran, PT, DPT     Referring Physician Signature: Iesha Arias MD     PAIN/SUBJECTIVE:   Initial: Pain Intensity 1: 3  Pain Location 1: Knee  Pain Orientation 1: Left  Post Session:  4/10   HISTORY:   History of Injury/Illness (Reason for Referral):  Patient underwent L TKA on 6-12-19, with no stated complications during hospital stay. He attended [facility] for continued rehab and is now ready for outpatient therapy. Past Medical History/Comorbidities:   Mr. 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 Mr. Charlyn Litten  has a past surgical history that includes hx other surgical (03/30/2018); hx orthopaedic (1972); hx knee arthroscopy (Left, 1994); hx hemorrhoidectomy (1973); hx colonoscopy (2016); and hx heart catheterization (2001). Social History/Living Environment:     Independent   Prior Level of Function/Work/Activity:  Part-time chen  Personal Factors:          Sex:  male        Age:  81 y.o. Ambulatory/Rehab Services H2 Model Falls Risk Assessment   Risk Factors:       (1)  Gender [Male] Ability to Rise from Chair:       (1)  Pushes up, successful in one attempt   Falls Prevention Plan:       No modifications necessary   Total: (5 or greater = High Risk): 2   ©2010 [facility] of [employer]. All Rights Reserved. [facility] Patent #[id number].  Federal Law prohibits the replication, distribution or use without written permission from [facility] [employer]   Current Medications:       Current Outpatient Medications:     metFORMIN (GLUMETZA ER) 500 mg TG24 24 hour tablet, Take 500 mg by mouth nightly. Indications: type 2 diabetes mellitus, Disp: , Rfl:     insulin glargine (LANTUS,BASAGLAR) 100 unit/mL (3 mL) inpn, 20 Units by SubCUTAneous route nightly., Disp: , Rfl:     losartan (COZAAR) 50 mg tablet, Take 50 mg by mouth nightly., Disp: , Rfl:     cyanocobalamin 1,000 mcg tablet, Take 1,000 mcg by mouth daily. , Disp: , Rfl:     fluticasone propionate (FLONASE ALLERGY RELIEF) 50 mcg/actuation nasal spray, 2 Sprays by Both Nostrils route daily as needed. , Disp: , Rfl:     tamsulosin (FLOMAX) 0.4 mg capsule, Take 0.4 mg by mouth nightly., Disp: , Rfl:     raNITIdine (ZANTAC) 150 mg tablet, Take 150 mg by mouth two (2) times daily as needed. Take / use AM day of surgery  per anesthesia protocols. , Disp: , Rfl:    Date Last Reviewed:  7/24/2019     Number of Personal Factors/Comorbidities that affect the Plan of Care: 0: LOW COMPLEXITY   EXAMINATION:   Observation/Orthostatic Postural Assessment:          Patient ambulates without assistive device, but with decreased weight bearing through LLE and decreased L TKE during push off; incision appears to be healing well with no signs of infection, but mild-moderate edema still present at L knee;   Palpation:          Patellar mobility and scar mobility are slightly hypomobile in all directions; no significant tenderness at L calf or posterior capsule   ROM:          R knee AROM: 0-125        L knee AROM: 3-105; L knee PROM: 2-115  Strength:          4/5 L knee extension and flexion, 5/5 L ankle DF  Special Tests:          Negative homans for DVT   Body Structures Involved:  1. Nerves  2. Bones  3. Joints  4. Muscles  5. Ligaments Body Functions Affected:  1. Sensory/Pain  2. Movement Related Activities and Participation Affected:  1. General Tasks and Demands  2. Mobility  3. Self Care  4. Domestic Life  5.  Interpersonal Interactions and Relationships  6.  Community, Social and [place]   Number of elements (examined above) that affect the Plan of Care: 3: MODERATE COMPLEXITY   CLINICAL PRESENTATION:   Presentation: Stable and uncomplicated: LOW COMPLEXITY   CLINICAL DECISION MAKING:   Use of outcome tool(s) and clinical judgement create a POC that gives a: Clear prediction of patient's progress: LOW COMPLEXITY

## 2019-07-24 NOTE — PROGRESS NOTES
Hailey Joaquin  : 1950  Primary[place]  Secondary: [employer] [address] at [facility]  [address] [place] [address]  [phone]   [phone]        OUTPATIENT PHYSICAL THERAPY: Daily Treatment Note 2019  Visit Count:  10    ICD-10: Treatment Diagnosis: Difficulty in walking, not elsewhere classified (R26.2); Pain in left knee (A27.597)  Precautions/Allergies:   Patient has no known allergies. TREATMENT PLAN:  Effective Dates: 2019 TO 2019 (60 days). Frequency/Duration: 2 times a week for 60 Day(s) MEDICAL/REFERRING DIAGNOSIS:  No admission diagnoses are documented for this encounter. DATE OF ONSET: s/p L TKA 19  REFERRING PHYSICIAN: Rogerio Delgado NP MD Orders: evaluate and treat  Return MD Appointment: 19       Pre-treatment Symptoms/Complaints:  Patient states he met with MD, who was overall pleased with his progress, but would like to see improved knee extension. Pain: Initial: Pain Intensity 1: 3  Pain Location 1: Knee  Pain Orientation 1: Left  Post Session:  3/10   Medications Last Reviewed:  2019  Updated Objective Findings:  L knee AROM: 2-110  TREATMENT:     THERAPEUTIC EXERCISE: (30 minutes):  Exercises per grid below to improve mobility and strength. Required minimal verbal cues to promote proper body alignment, promote proper body posture and promote proper body mechanics. Progressed complexity of movement as indicated.      Date:  19 Date:  7-10-10 Date  19 Date  19 Date  19   Activity/Exercise Parameters       Airdyne     10 minutes  Seat 6 8 minutes  Seat 6 10 minutes  Seat 6   Stepper   11 minutes  Level 2 11 minutes  Level 2      Calf stretch   Incline 5x20 seconds Incline 5x20 seconds Incline 5x20 seconds Incline 5x20 seconds Incline 5x20 seconds   Knee flexion and extension over-pressure    Manual by clinician in both planes  20 second hold at available end range, patient supine, x10 each Manual by clinician in both planes  20 second hold at available end range, patient supine, x10 each Manual by clinician in both planes  20 second hold at available end range, patient supine for extension and prone for flexion, x10 each Manual by clinician in both planes  20 second hold at available end range, patient supine for extension and prone for flexion, x10 each Manual by clinician in both planes  20 second hold at available end range, patient supine for extension and prone for flexion, x10 each   SAQ   3 lb  2x10       SLR   3 lb  2x10  3 lb  3x10     Step up       6 inch steps  5x2 steps leading with L     Machine leg curl      60 lb  2x10 75 lb   B/L 2x10  45 lb  LLE 2x10   Machine leg press      85 lb  2x10 leg press  85 lb  2x10 Calf press 85 lb  2x10 leg press  85 lb  2x10 Calf press     MODALITIES: (10 minutes):      *  Cold Pack Therapy in order to provide analgesia and reduce inflammation and edema. Patient received vasopneumatic compression via GameReady to L knee, patient supine with LEs supported under wedge, medium compression, 46 degrees      Treatment/Session Summary:    · Response to Treatment:  Patient is demonstrating steadily improving gait and LE strength. His knee extension is still lagging roughly 3-5 degrees of full extension, so we discussed increasing focus on this area at home. .     · Communication/Consultation:  None today  · Equipment provided today:  None today  · Recommendations/Intent for next treatment session: Next visit will focus on increasing motion and challenging LE strength, TKA guidelines.     Total Treatment Billable Duration:  40 minutes - 30 minutes therapeutic exercise and 10 minutes vasopneumatic compression   PT Patient Time In/Time Out  Time In: 1100  Time Out: [address] PT, DPT    Future Appointments   Date Time Provider Maira Correa   7/24/2019 11:00 AM David Flores, PT, DPT [facility]   7/29/2019 11:00 AM Isaac Ibarra, PT, DPT [facility] [place]   7/31/2019 10:15 AM Isaac Ibarra, PT, DPT [facility] [place]   8/5/2019 11:00 AM Isaac Ibarra, PT, DPT [facility] [place]   8/7/2019 11:00 AM Isaac Ibarra, PT, DPT [facility] [place]   8/12/2019 11:00 AM Oumar Duke, PT, DPT [facility] [place]

## 2019-07-29 ENCOUNTER — HOSPITAL ENCOUNTER (OUTPATIENT)
Dept: PHYSICAL THERAPY | Age: 69
Discharge: HOME | End: 2019-07-29
Payer: MEDICARE

## 2019-07-29 PROCEDURE — 97110 THERAPEUTIC EXERCISES: CPT

## 2019-07-29 PROCEDURE — 97016 VASOPNEUMATIC DEVICE THERAPY: CPT

## 2019-07-29 NOTE — PROGRESS NOTES
Harshil Thomson  : 1950  [place]  Secondary: [employer] Generic [address] at [facility]  [address] [place] [address]  [phone]   [phone]        OUTPATIENT PHYSICAL THERAPY: Daily Treatment Note 2019  Visit Count:  11    ICD-10: Treatment Diagnosis: Difficulty in walking, not elsewhere classified (R26.2); Pain in left knee (X15.317)  Precautions/Allergies:   Patient has no known allergies. TREATMENT PLAN:  Effective Dates: 2019 TO 2019 (60 days). Frequency/Duration: 2 times a week for 60 Day(s) MEDICAL/REFERRING DIAGNOSIS:  No admission diagnoses are documented for this encounter. DATE OF ONSET: s/p L TKA 19  REFERRING PHYSICIAN: Moshe Justin NP MD Orders: evaluate and treat  Return MD Appointment: 19       Pre-treatment Symptoms/Complaints:  Patient appears to be walking with much improved quality and speed, despite a busy travel weekend of prolonged walking and activity. Pain: Initial: Pain Intensity 1: 2  Pain Location 1: Knee  Pain Orientation 1: Left  Post Session:  3/10   Medications Last Reviewed:  2019  Updated Objective Findings:  L knee AROM: 2-110  TREATMENT:     THERAPEUTIC EXERCISE: (30 minutes):  Exercises per grid below to improve mobility and strength. Required minimal verbal cues to promote proper body alignment, promote proper body posture and promote proper body mechanics. Progressed complexity of movement as indicated.      Date:  19 Date:  7-10-10 Date  19 Date  19 Date  19   Activity/Exercise Parameters       Airdyne     10 minutes  Seat 6 8 minutes  Seat 6 10 minutes  Seat 6   Stepper   11 minutes  Level 2 11 minutes  Level 2      Calf stretch   Incline 5x20 seconds Incline 5x20 seconds Incline 5x20 seconds Incline 5x20 seconds Incline 5x20 seconds   Knee flexion and extension over-pressure    Manual by clinician in both planes  20 second hold at available end range, patient supine, x10 each Manual by clinician in both planes  20 second hold at available end range, patient supine, x10 each Manual by clinician in both planes  20 second hold at available end range, patient supine for extension and prone for flexion, x10 each Manual by clinician in both planes  20 second hold at available end range, patient supine for extension and prone for flexion, x10 each Manual by clinician in both planes  20 second hold at available end range, patient supine for extension and prone for flexion, x10 each   SAQ   3 lb  2x10       SLR   3 lb  2x10  3 lb  3x10     Step up       6 inch steps  5x2 steps leading with L     Machine leg curl      60 lb  2x10 75 lb   B/L 2x10  45 lb  LLE 2x10   Machine leg press      85 lb  2x10 leg press  85 lb  2x10 Calf press 85 lb  2x10 leg press  85 lb  2x10 Calf press     MODALITIES: (10 minutes):      *  Cold Pack Therapy in order to provide analgesia and reduce inflammation and edema. Patient received vasopneumatic compression via GameReady to L knee, patient supine with LEs supported under wedge, medium compression, 46 degrees      Treatment/Session Summary:    · Response to Treatment:  Patient is demonstrating steadily improving gait and LE strength. Overall, progressing very well at this time. · Communication/Consultation:  None today  · Equipment provided today:  None today  · Recommendations/Intent for next treatment session: Next visit will focus on increasing motion and challenging LE strength, TKA guidelines.     Total Treatment Billable Duration:  40 minutes - 30 minutes therapeutic exercise and 10 minutes vasopneumatic compression   PT Patient Time In/Time Out  Time In: 1315  Time Out: Ros Lloyd, PT, DPT    Future Appointments   Date Time Provider Maira Correa   7/31/2019  3:00 PM Washington Mcgowan, PT, DPT [facility]   8/5/2019 11:00 AM Washington Mcgowan PT, DPT [facility]   8/7/2019 11:00 AM Ana Kaur, PT, DPT [facility]   8/12/2019 11:00 AM Trey Potts, PT, DPT [facility]

## 2019-07-31 ENCOUNTER — HOSPITAL ENCOUNTER (OUTPATIENT)
Dept: PHYSICAL THERAPY | Age: 69
Discharge: HOME | End: 2019-07-31
Payer: MEDICARE

## 2019-07-31 PROCEDURE — 97016 VASOPNEUMATIC DEVICE THERAPY: CPT

## 2019-07-31 PROCEDURE — 97110 THERAPEUTIC EXERCISES: CPT

## 2019-07-31 NOTE — PROGRESS NOTES
Kaleigh Suero  : 1950  Primary: [place]  Secondary: [employer] Generic [address] at [facility]  [address] [place] [address]  [phone]   [phone]        OUTPATIENT PHYSICAL THERAPY: Daily Treatment Note 2019  Visit Count:  12    ICD-10: Treatment Diagnosis: Difficulty in walking, not elsewhere classified (R26.2); Pain in left knee (I65.653)  Precautions/Allergies:   Patient has no known allergies. TREATMENT PLAN:  Effective Dates: 2019 TO 2019 (60 days). Frequency/Duration: 2 times a week for 60 Day(s) MEDICAL/REFERRING DIAGNOSIS:  No admission diagnoses are documented for this encounter. DATE OF ONSET: s/p L TKA 19  REFERRING PHYSICIAN: Nakia Dozier NP MD Orders: evaluate and treat  Return MD Appointment: 19       Pre-treatment Symptoms/Complaints:  Patient reports he is still heavily focusing on his ROM, especially his knee extension. Pain: Initial: Pain Intensity 1: 2  Pain Location 1: Knee  Pain Orientation 1: Left  Post Session:  3/10   Medications Last Reviewed:  2019  Updated Objective Findings:  L knee AROM: 2-110  TREATMENT:     THERAPEUTIC EXERCISE: (30 minutes):  Exercises per grid below to improve mobility and strength. Required minimal verbal cues to promote proper body alignment, promote proper body posture and promote proper body mechanics. Progressed complexity of movement as indicated.      Date  19 Date  19 Date   19 Date  19   Activity/Exercise       Airdyne   10 minutes  Seat 6 8 minutes  Seat 6 10 minutes  Seat 6 10 minutes  Seat 6   Stepper         Calf stretch   Incline 5x20 seconds Incline 5x20 seconds Incline 5x20 seconds Incline 5x20 seconds   Knee flexion and extension over-pressure    Manual by clinician in both planes  20 second hold at available end range, patient supine for extension and prone for flexion, x10 each Manual by clinician in both planes  20 second hold at available end range, patient supine for extension and prone for flexion, x10 each Manual by clinician in both planes  20 second hold at available end range, patient supine for extension and prone for flexion, x10 each Manual by clinician in both planes  20 second hold at available end range, patient supine for extension and prone for flexion, x10 each   SAQ         SLR   3 lb  3x10      Step up     6 inch steps  5x2 steps leading with L      Machine leg curl    60 lb  2x10 75 lb   B/L 2x10  45 lb  LLE 2x10 75 lb   B/L 2x10  45 lb  LLE 2x10   Machine leg press    85 lb  2x10 leg press  85 lb  2x10 Calf press 85 lb  2x10 leg press  85 lb  2x10 Calf press 85 lb  2x10 leg press  85 lb  2x10 Calf press            MODALITIES: (10 minutes):      *  Cold Pack Therapy in order to provide analgesia and reduce inflammation and edema. Patient received vasopneumatic compression via GameReady to L knee, patient supine with LEs supported under wedge, medium compression, 46 degrees      Treatment/Session Summary:    · Response to Treatment:  Patient still lacks roughly 3-5 degrees of knee extension despite aggressive focus on this deficit. Will continue to push motion and LE strength. · Communication/Consultation:  None today  · Equipment provided today:  None today  · Recommendations/Intent for next treatment session: Next visit will focus on increasing motion and challenging LE strength, TKA guidelines.     Total Treatment Billable Duration:  40 minutes - 30 minutes therapeutic exercise and 10 minutes vasopneumatic compression   PT Patient Time In/Time Out  Time In: 1500  Time Out: Rigo PT, DPT    Future Appointments   Date Time Provider Maira Correa   8/5/2019 11:00 AM Mayo Urbina, PT, DPT [facility]   8/7/2019 11:00 AM Mayo Urbina, PT, DPT [facility]   8/12/2019 11:00 AM Mayo Urbina, PT, DPT [facility]

## 2019-08-05 ENCOUNTER — HOSPITAL ENCOUNTER (OUTPATIENT)
Dept: PHYSICAL THERAPY | Age: 69
Discharge: HOME | End: 2019-08-05
Payer: MEDICARE

## 2019-08-05 PROCEDURE — 97110 THERAPEUTIC EXERCISES: CPT

## 2019-08-05 PROCEDURE — 97016 VASOPNEUMATIC DEVICE THERAPY: CPT

## 2019-08-05 NOTE — PROGRESS NOTES
Eddie Rank  : 1950  Primary: Greg[place]  Secondary: Bshsi Generic [address] at [facility]  [address] [place] [address]  [phone]   [phone]        OUTPATIENT PHYSICAL THERAPY: Daily Treatment Note 2019  Visit Count:  13    ICD-10: Treatment Diagnosis: Difficulty in walking, not elsewhere classified (R26.2); Pain in left knee (S26.830)  Precautions/Allergies:   Patient has no known allergies. TREATMENT PLAN:  Effective Dates: 2019 TO 2019 (60 days). Frequency/Duration: 2 times a week for 60 Day(s) MEDICAL/REFERRING DIAGNOSIS:  No admission diagnoses are documented for this encounter. DATE OF ONSET: s/p L TKA 19  REFERRING PHYSICIAN: Xu Sterling NP MD Orders: evaluate and treat  Return MD Appointment: 19       Pre-treatment Symptoms/Complaints:  Patient reports he worked on some bicycle motion when he woke throughout the night, and he feels that this helped a lot with his knee stiffness this morning. Pain: Initial: Pain Intensity 1: 2  Pain Location 1: Knee  Pain Orientation 1: Left  Post Session:  2/10   Medications Last Reviewed:  2019  Updated Objective Findings:  L knee AROM: 2-110  TREATMENT:     THERAPEUTIC EXERCISE: (30 minutes):  Exercises per grid below to improve mobility and strength. Required minimal verbal cues to promote proper body alignment, promote proper body posture and promote proper body mechanics. Progressed complexity of movement as indicated.      Date  19 Date  19 Date   19 Date  19   Activity/Exercise       Airdyne   10 minutes  Seat 6 8 minutes  Seat 6 10 minutes  Seat 6 10 minutes  Seat 6   Stepper         Calf stretch   Incline 5x20 seconds Incline 5x20 seconds Incline 5x20 seconds Incline 5x20 seconds   Knee flexion and extension over-pressure    Manual by clinician in both planes  20 second hold at available end range, patient supine for extension and prone for flexion, x10 each Manual by clinician in both planes  20 second hold at available end range, patient supine for extension and prone for flexion, x10 each Manual by clinician in both planes  20 second hold at available end range, patient supine for extension and prone for flexion, x10 each Manual by clinician in both planes  20 second hold at available end range, patient supine for extension and prone for flexion, x10 each   SAQ         SLR   3 lb  3x10      Step up     6 inch steps  5x2 steps leading with L      Machine leg curl    60 lb  2x10 75 lb   B/L 2x10  45 lb  LLE 2x10 75 lb   B/L 2x10  45 lb  LLE 2x10   Machine leg press    85 lb  2x10 leg press  85 lb  2x10 Calf press 85 lb  2x10 leg press  85 lb  2x10 Calf press 85 lb  2x10 leg press  85 lb  2x10 Calf press            MODALITIES: (10 minutes):      *  Cold Pack Therapy in order to provide analgesia and reduce inflammation and edema. Patient received vasopneumatic compression via GameReady to L knee, patient supine with LEs supported under wedge, medium compression, 46 degrees      Treatment/Session Summary:    · Response to Treatment:  Patient still lacks roughly 3-5 degrees of knee extension despite aggressive focus on this deficit. Will continue to push motion and LE strength. · Communication/Consultation:  None today  · Equipment provided today:  None today  · Recommendations/Intent for next treatment session: Next visit will focus on increasing motion and challenging LE strength, TKA guidelines.     Total Treatment Billable Duration:  40 minutes - 30 minutes therapeutic exercise and 10 minutes vasopneumatic compression   PT Patient Time In/Time Out  Time In: 1100  Time Out: [address] PT, DPT    Future Appointments   Date Time Provider Maira Correa   8/7/2019 11:00 AM Marino Burkett, PT, DPT [facility]   8/12/2019 11:00 AM Marino Burkett, PT, DPT [facility]

## 2019-08-07 ENCOUNTER — HOSPITAL ENCOUNTER (OUTPATIENT)
Dept: PHYSICAL THERAPY | Age: 69
Discharge: HOME | End: 2019-08-07
Payer: MEDICARE

## 2019-08-07 PROCEDURE — 97016 VASOPNEUMATIC DEVICE THERAPY: CPT

## 2019-08-07 PROCEDURE — 97110 THERAPEUTIC EXERCISES: CPT

## 2019-08-07 NOTE — PROGRESS NOTES
Makayla Underwood  : 1950  [place]ori [place]  Secondary: Bshsi Generic [address] at [facility]  [address] [place] [address]  [phone]   [phone]        OUTPATIENT PHYSICAL THERAPY: Daily Treatment Note 2019  Visit Count:  14    ICD-10: Treatment Diagnosis: Difficulty in walking, not elsewhere classified (R26.2); Pain in left knee (B28.485)  Precautions/Allergies:   Patient has no known allergies. TREATMENT PLAN:  Effective Dates: 2019 TO 2019 (60 days). Frequency/Duration: 2 times a week for 60 Day(s) MEDICAL/REFERRING DIAGNOSIS:  No admission diagnoses are documented for this encounter. DATE OF ONSET: s/p L TKA 19  REFERRING PHYSICIAN: Josue Ingram NP MD Orders: evaluate and treat  Return MD Appointment: 19       Pre-treatment Symptoms/Complaints:  Patient reports posterior knee capsule tightness and pain today, but not significant. Pain: Initial: Pain Intensity 1: 2  Pain Location 1: Knee  Pain Orientation 1: Left  Post Session:  2/10   Medications Last Reviewed:  2019  Updated Objective Findings:  L knee AROM: 2-110  TREATMENT:     THERAPEUTIC EXERCISE: (30 minutes):  Exercises per grid below to improve mobility and strength. Required minimal verbal cues to promote proper body alignment, promote proper body posture and promote proper body mechanics. Progressed complexity of movement as indicated.      Date  19 Date  7-18-19 Date   7-29-19 Date  19   Activity/Exercise       Airdyne   10 minutes  Seat 6 8 minutes  Seat 6 10 minutes  Seat 6 10 minutes  Seat 6   Stepper         Calf stretch   Incline 5x20 seconds Incline 5x20 seconds Incline 5x20 seconds Incline 5x20 seconds   Knee flexion and extension over-pressure    Manual by clinician in both planes  20 second hold at available end range, patient supine for extension and prone for flexion, x10 each Manual by clinician in both planes  20 second hold at available end range, patient supine for extension and prone for flexion, x10 each Manual by clinician in both planes  20 second hold at available end range, patient supine for extension and prone for flexion, x10 each Manual by clinician in both planes  20 second hold at available end range, patient supine for extension and prone for flexion, x10 each   SAQ         SLR   3 lb  3x10      Step up     6 inch steps  5x2 steps leading with L      Machine leg curl    60 lb  2x10 75 lb   B/L 2x10  45 lb  LLE 2x10 75 lb   B/L 2x10  45 lb  LLE 2x10   Machine leg press    85 lb  2x10 leg press  85 lb  2x10 Calf press 85 lb  2x10 leg press  85 lb  2x10 Calf press 85 lb  2x10 leg press  85 lb  2x10 Calf press            MODALITIES: (10 minutes):      *  Cold Pack Therapy in order to provide analgesia and reduce inflammation and edema. Patient received vasopneumatic compression via GameReady to L knee, patient supine with LEs supported under wedge, medium compression, 46 degrees      Treatment/Session Summary:    · Response to Treatment:  Patient is demonstrating steadily improved gait quality and speed. Terminal knee extension is also improving, both passively and during gait. · Communication/Consultation:  None today  · Equipment provided today:  None today  · Recommendations/Intent for next treatment session: Next visit will focus on increasing motion and challenging LE strength, TKA guidelines.     Total Treatment Billable Duration:  40 minutes - 30 minutes therapeutic exercise and 10 minutes vasopneumatic compression   PT Patient Time In/Time Out  Time In: 1100  Time Out: [address] PT, DPT    Future Appointments   Date Time Provider Maira Madeline   8/12/2019 11:00 AM Isabela Alex, PT, DPT [facility]

## 2019-08-12 ENCOUNTER — HOSPITAL ENCOUNTER (OUTPATIENT)
Dept: PHYSICAL THERAPY | Age: 69
Discharge: HOME | End: 2019-08-12
Payer: MEDICARE

## 2019-08-12 PROCEDURE — 97110 THERAPEUTIC EXERCISES: CPT

## 2019-08-12 PROCEDURE — 97016 VASOPNEUMATIC DEVICE THERAPY: CPT

## 2019-08-12 NOTE — PROGRESS NOTES
Liliam Rosales  : 1950  [place] Jerald [place]  Secondary: Bshsi Generic [address] at [facility]  [address] [place] [address]  [phone]   [phone]        OUTPATIENT PHYSICAL THERAPY: Daily Treatment Note 2019  Visit Count:  15    ICD-10: Treatment Diagnosis: Difficulty in walking, not elsewhere classified (R26.2); Pain in left knee (L14.123)  Precautions/Allergies:   Patient has no known allergies. TREATMENT PLAN:  Effective Dates: 2019 TO 2019 (60 days). Frequency/Duration: 2 times a week for 60 Day(s) MEDICAL/REFERRING DIAGNOSIS:  No admission diagnoses are documented for this encounter. DATE OF ONSET: s/p L TKA 19  REFERRING PHYSICIAN: Marques Aguila NP MD Orders: evaluate and treat  Return MD Appointment: 19       Pre-treatment Symptoms/Complaints:  Patient reports he notices improved swelling when he performs some knee mobility exercises when first waking. Swelling does look improved before beginning therapy. Pain: Initial: Pain Intensity 1: 2  Pain Location 1: Knee  Pain Orientation 1: Left  Post Session:  2/10   Medications Last Reviewed:  2019  Updated Objective Findings:  L knee AROM: 2-110  TREATMENT:     THERAPEUTIC EXERCISE: (30 minutes):  Exercises per grid below to improve mobility and strength. Required minimal verbal cues to promote proper body alignment, promote proper body posture and promote proper body mechanics. Progressed complexity of movement as indicated.      Date  7-16-19 Date  19 Date   19 Date  19 Date  19   Activity/Exercise        Airdyne   10 minutes  Seat 6 8 minutes  Seat 6 10 minutes  Seat 6 10 minutes  Seat 6 10 minutes  Seat 6   Stepper          Calf stretch   Incline 5x20 seconds Incline 5x20 seconds Incline 5x20 seconds Incline 5x20 seconds Incline 5x20 seconds   Knee flexion and extension over-pressure    Manual by clinician in both planes  20 second hold at available end range, patient supine for extension and prone for flexion, x10 each Manual by clinician in both planes  20 second hold at available end range, patient supine for extension and prone for flexion, x10 each Manual by clinician in both planes  20 second hold at available end range, patient supine for extension and prone for flexion, x10 each Manual by clinician in both planes  20 second hold at available end range, patient supine for extension and prone for flexion, x10 each Manual by clinician in both planes  20 second hold at available end range, patient supine for extension and prone for flexion, x10 each   SAQ          SLR   3 lb  3x10       Step up     6 inch steps  5x2 steps leading with L       Machine leg curl    60 lb  2x10 75 lb   B/L 2x10  45 lb  LLE 2x10 75 lb   B/L 2x10  45 lb  LLE 2x10   45 lb  LLE 4x10   Machine leg press    85 lb  2x10 leg press  85 lb  2x10 Calf press 85 lb  2x10 leg press  85 lb  2x10 Calf press 85 lb  2x10 leg press  85 lb  2x10 Calf press 85 lb  2x10 leg press  85 lb  2x10 Calf press             MODALITIES: (10 minutes):      *  Cold Pack Therapy in order to provide analgesia and reduce inflammation and edema. Patient received vasopneumatic compression via GameReady to L knee, patient supine with LEs supported under wedge, medium compression, 46 degrees      Treatment/Session Summary:    · Response to Treatment:  Patient is demonstrating steadily improved gait quality and speed. Terminal knee extension is also improving, both passively and during gait. · Communication/Consultation:  None today  · Equipment provided today:  None today  · Recommendations/Intent for next treatment session: Next visit will focus on increasing motion and challenging LE strength, TKA guidelines.     Total Treatment Billable Duration:  40 minutes - 30 minutes therapeutic exercise and 10 minutes vasopneumatic compression   PT Patient Time In/Time Out  Time In: 1100  Time Out: [address] PT, DPT    No future appointments.

## 2019-08-14 ENCOUNTER — HOSPITAL ENCOUNTER (OUTPATIENT)
Dept: PHYSICAL THERAPY | Age: 69
Discharge: HOME | End: 2019-08-14
Payer: MEDICARE

## 2019-08-14 PROCEDURE — 97110 THERAPEUTIC EXERCISES: CPT

## 2019-08-14 PROCEDURE — 97016 VASOPNEUMATIC DEVICE THERAPY: CPT

## 2019-08-14 NOTE — PROGRESS NOTES
Ketan Narvaez  : 1950  Primary: Geoffrey Subramanian [place]m*  Secondary: Bshsi Generic [address] at [facility]  [address] [place] [address]  [phone]   [phone]        OUTPATIENT PHYSICAL THERAPY: Daily Treatment Note 2019  Visit Count:  16    ICD-10: Treatment Diagnosis: Difficulty in walking, not elsewhere classified (R26.2); Pain in left knee (E34.811)  Precautions/Allergies:   Patient has no known allergies. TREATMENT PLAN:  Effective Dates: 2019 TO 2019 (60 days). Frequency/Duration: 2 times a week for 60 Day(s) MEDICAL/REFERRING DIAGNOSIS:  No admission diagnoses are documented for this encounter. DATE OF ONSET: s/p L TKA 19  REFERRING PHYSICIAN: Marco Mcmahon NP MD Orders: evaluate and treat  Return MD Appointment: 19       Pre-treatment Symptoms/Complaints:  Patient reports he is performing more ADLs and higher demanding activities outside of therapy. Pain: Initial: Pain Intensity 1: 2  Pain Location 1: Knee  Pain Orientation 1: Left  Post Session:  2/10   Medications Last Reviewed:  2019  Updated Objective Findings:  L knee AROM: 2-110  TREATMENT:     THERAPEUTIC EXERCISE: (30 minutes):  Exercises per grid below to improve mobility and strength. Required minimal verbal cues to promote proper body alignment, promote proper body posture and promote proper body mechanics. Progressed complexity of movement as indicated.      Date  7-16-19 Date  7-18-19 Date   7-29-19 Date  19 Date  19   Activity/Exercise        Airdyne   10 minutes  Seat 6 8 minutes  Seat 6 10 minutes  Seat 6 10 minutes  Seat 6 10 minutes  Seat 6   Stepper          Calf stretch   Incline 5x20 seconds Incline 5x20 seconds Incline 5x20 seconds Incline 5x20 seconds Incline 5x20 seconds   Knee flexion and extension over-pressure    Manual by clinician in both planes  20 second hold at available end range, patient supine for extension and prone for flexion, x10 each Manual by clinician in both planes  20 second hold at available end range, patient supine for extension and prone for flexion, x10 each Manual by clinician in both planes  20 second hold at available end range, patient supine for extension and prone for flexion, x10 each Manual by clinician in both planes  20 second hold at available end range, patient supine for extension and prone for flexion, x10 each Manual by clinician in both planes  20 second hold at available end range, patient supine for extension and prone for flexion, x10 each   SAQ          SLR   3 lb  3x10       Step up     6 inch steps  5x2 steps leading with L       Machine leg curl    60 lb  2x10 75 lb   B/L 2x10  45 lb  LLE 2x10 75 lb   B/L 2x10  45 lb  LLE 2x10   45 lb  LLE 4x10   Machine leg press    85 lb  2x10 leg press  85 lb  2x10 Calf press 85 lb  2x10 leg press  85 lb  2x10 Calf press 85 lb  2x10 leg press  85 lb  2x10 Calf press 85 lb  2x10 leg press  85 lb  2x10 Calf press             MODALITIES: (10 minutes):      *  Cold Pack Therapy in order to provide analgesia and reduce inflammation and edema. Patient received vasopneumatic compression via GameReady to L knee, patient supine with LEs supported under wedge, medium compression, 46 degrees      Treatment/Session Summary:    · Response to Treatment:  Patient is demonstrating steadily improved gait quality and speed. Terminal knee extension is also improving, both passively and during gait. · Communication/Consultation:  None today  · Equipment provided today:  None today  · Recommendations/Intent for next treatment session: Next visit will focus on increasing motion and challenging LE strength, TKA guidelines.     Total Treatment Billable Duration:  40 minutes - 30 minutes therapeutic exercise and 10 minutes vasopneumatic compression   PT Patient Time In/Time Out  Time In: 1100  Time Out: [address] PT, DPT    Future Appointments   Date Time Provider Maira Correa   8/19/2019 10:30 AM Frankie Steve, PT, DPT [facility]   8/21/2019  1:00 PM Frankie Steve PT, DPT [facility]   8/27/2019 11:00 AM Frankie Steve PT, DPT [facility]

## 2019-08-19 ENCOUNTER — HOSPITAL ENCOUNTER (OUTPATIENT)
Dept: PHYSICAL THERAPY | Age: 69
Discharge: HOME | End: 2019-08-19
Payer: MEDICARE

## 2019-08-19 PROCEDURE — 97016 VASOPNEUMATIC DEVICE THERAPY: CPT

## 2019-08-19 PROCEDURE — 97110 THERAPEUTIC EXERCISES: CPT

## 2019-08-19 NOTE — PROGRESS NOTES
I am accessing Mr. Freitas's chart as a part of our department's internal chart auditing process. I certify that Mr. Fam García is, or was, a patient in our department.   Thank you,  Coleen Weston, PT  8/19/2019

## 2019-08-19 NOTE — PROGRESS NOTES
Idelle Lennox  : 1950  Primary: Luz Maria Montoya [place]m*  Secondary: Bshsi Generic [address] at [facility]  [address] [place] [address]  [phone]   [phone]        OUTPATIENT PHYSICAL THERAPY: Daily Treatment Note 2019  Visit Count:  17    ICD-10: Treatment Diagnosis: Difficulty in walking, not elsewhere classified (R26.2); Pain in left knee (N37.450)  Precautions/Allergies:   Patient has no known allergies. TREATMENT PLAN:  Effective Dates: 2019 TO 2019 (60 days). Frequency/Duration: 2 times a week for 60 Day(s) MEDICAL/REFERRING DIAGNOSIS:  No admission diagnoses are documented for this encounter. DATE OF ONSET: s/p L TKA 19  REFERRING PHYSICIAN: Juan Barbour NP MD Orders: evaluate and treat  Return MD Appointment: 19       Pre-treatment Symptoms/Complaints:  Patient reports his knee is still most stiff in the early AM, but walking with improved speed and quality today. Pain: Initial: Pain Intensity 1: 3  Pain Location 1: Knee  Pain Orientation 1: Left  Post Session:  2/10   Medications Last Reviewed:  2019  Updated Objective Findings:  L knee AROM: 2-110  TREATMENT:     THERAPEUTIC EXERCISE: (30 minutes):  Exercises per grid below to improve mobility and strength. Required minimal verbal cues to promote proper body alignment, promote proper body posture and promote proper body mechanics. Progressed complexity of movement as indicated.      Date  7-16-19 Date  7-18-19 Date   7-29-19 Date  19 Date  19   Activity/Exercise        Airdyne   10 minutes  Seat 6 8 minutes  Seat 6 10 minutes  Seat 6 10 minutes  Seat 6 10 minutes  Seat 6   Stepper          Calf stretch   Incline 5x20 seconds Incline 5x20 seconds Incline 5x20 seconds Incline 5x20 seconds Incline 5x20 seconds   Knee flexion and extension over-pressure    Manual by clinician in both planes  20 second hold at available end range, patient supine for extension and prone for flexion, x10 each Manual by clinician in both planes  20 second hold at available end range, patient supine for extension and prone for flexion, x10 each Manual by clinician in both planes  20 second hold at available end range, patient supine for extension and prone for flexion, x10 each Manual by clinician in both planes  20 second hold at available end range, patient supine for extension and prone for flexion, x10 each Manual by clinician in both planes  20 second hold at available end range, patient supine for extension and prone for flexion, x10 each   SAQ          SLR   3 lb  3x10       Step up     6 inch steps  5x2 steps leading with L       Machine leg curl    60 lb  2x10 75 lb   B/L 2x10  45 lb  LLE 2x10 75 lb   B/L 2x10  45 lb  LLE 2x10   45 lb  LLE 4x10   Machine leg press    85 lb  2x10 leg press  85 lb  2x10 Calf press 85 lb  2x10 leg press  85 lb  2x10 Calf press 85 lb  2x10 leg press  85 lb  2x10 Calf press 85 lb  2x10 leg press  85 lb  2x10 Calf press             MODALITIES: (10 minutes):      *  Cold Pack Therapy in order to provide analgesia and reduce inflammation and edema. Patient received vasopneumatic compression via GameReady to L knee, patient supine with LEs supported under wedge, medium compression, 46 degrees      Treatment/Session Summary:    · Response to Treatment:  Patient is able to achieve near full extension during passive over-pressure by clinician, but he still lacks 3-5 degrees during active movement. · Communication/Consultation:  None today  · Equipment provided today:  None today  · Recommendations/Intent for next treatment session: Next visit will focus on increasing motion and challenging LE strength, TKA guidelines.     Total Treatment Billable Duration:  40 minutes - 30 minutes therapeutic exercise and 10 minutes vasopneumatic compression   PT Patient Time In/Time Out  Time In: 1030  Time Out: 1115  Oskar Dhaliwal, PT, DPT    Future Appointments   Date Time Provider Maira Madeline   8/21/2019  1:00 PM Charle Litten, [place], DPT [facility]   8/27/2019 11:00 AM Charle Litten, PT, DPT [facility]

## 2019-08-21 ENCOUNTER — HOSPITAL ENCOUNTER (OUTPATIENT)
Dept: PHYSICAL THERAPY | Age: 69
End: 2019-08-21
Payer: MEDICARE

## 2019-08-23 ENCOUNTER — HOSPITAL ENCOUNTER (OUTPATIENT)
Dept: SURGERY | Age: 69
Discharge: HOME | End: 2019-08-23

## 2019-08-23 VITALS — BODY MASS INDEX: 26.24 KG/M2 | WEIGHT: 211 LBS | HEIGHT: 75 IN

## 2019-08-23 NOTE — PROGRESS NOTES
I am accessing Mr. Freitas's chart as a part of our department's internal chart auditing process. I certify that Mr. Estephanie Costa is, or was, a patient in our department.   Thank you,  Zbigniew Loco, PT  8/23/2019

## 2019-08-23 NOTE — PERIOP NOTES
Patient verified name and . Order for consent found in EHR and matches case posting; patient verifies procedure. Type 1B surgery, phone assessment complete. Orders received. Labs per surgeon: none needed. Labs per anesthesia protocol: none needed. Patient answered medical/surgical history questions at their best of ability. All prior to admission medications documented in [facility]. Patient instructed to take the following medications the day of surgery according to anesthesia guidelines with a small sip of water: zantac, flonase if needed. Hold all vitamins 7 days prior to surgery and NSAIDS 5 days prior to surgery. Prescription meds to hold: none. Instructed to take 16 units of Lantus the night before on 19. Patient instructed on the following:  Arrive at A Entrance, time of arrival to be called the day before by 1700  NPO after midnight including gum, mints, and ice chips  Responsible adult must drive patient to the hospital, stay during surgery, and patient will need supervision 24 hours after anesthesia  Use dial antibacterial soap in shower the night before surgery and on the morning of surgery  All piercings must be removed prior to arrival.    Leave all valuables (money and jewelry) at home but bring insurance card and ID on DOS. Do not wear make-up, nail polish, lotions, cologne, perfumes, powders, or oil on skin. Patient teach back successful and patient demonstrates knowledge of instruction.

## 2019-08-26 ENCOUNTER — ANESTHESIA EVENT (OUTPATIENT)
Dept: SURGERY | Age: 69
End: 2019-08-26
Payer: MEDICARE

## 2019-08-26 ENCOUNTER — HOSPITAL ENCOUNTER (OUTPATIENT)
Age: 69
Setting detail: OBSERVATION
Discharge: HOME | End: 2019-08-27
Attending: ORTHOPAEDIC SURGERY | Admitting: ORTHOPAEDIC SURGERY
Payer: MEDICARE

## 2019-08-26 ENCOUNTER — ANESTHESIA (OUTPATIENT)
Dept: SURGERY | Age: 69
End: 2019-08-26
Payer: MEDICARE

## 2019-08-26 DIAGNOSIS — M24.662 ARTHROFIBROSIS OF KNEE JOINT, LEFT: Primary | ICD-10-CM

## 2019-08-26 PROBLEM — M24.669 ARTHROFIBROSIS OF KNEE JOINT, UNSPECIFIED LATERALITY: Status: ACTIVE | Noted: 2019-08-26

## 2019-08-26 LAB — GLUCOSE BLD STRIP.AUTO-MCNC: 156 MG/DL (ref 65–100)

## 2019-08-26 PROCEDURE — 82962 GLUCOSE BLOOD TEST: CPT

## 2019-08-26 PROCEDURE — 76942 ECHO GUIDE FOR BIOPSY: CPT | Performed by: ORTHOPAEDIC SURGERY

## 2019-08-26 PROCEDURE — 74011250636 HC RX REV CODE- 250/636: Performed by: ANESTHESIOLOGY

## 2019-08-26 PROCEDURE — 76210000006 HC OR PH I REC 0.5 TO 1 HR: Performed by: ORTHOPAEDIC SURGERY

## 2019-08-26 PROCEDURE — 99218 HC RM OBSERVATION: CPT

## 2019-08-26 PROCEDURE — 76010010054 HC POST OP PAIN BLOCK: Performed by: ORTHOPAEDIC SURGERY

## 2019-08-26 PROCEDURE — 77030003602 HC NDL NRV BLK BBMI -B: Performed by: ANESTHESIOLOGY

## 2019-08-26 PROCEDURE — 74011636637 HC RX REV CODE- 636/637: Performed by: PHYSICIAN ASSISTANT

## 2019-08-26 PROCEDURE — 97161 PT EVAL LOW COMPLEX 20 MIN: CPT

## 2019-08-26 PROCEDURE — 74011250636 HC RX REV CODE- 250/636

## 2019-08-26 PROCEDURE — 76010000154 HC OR TIME FIRST 0.5 HR: Performed by: ORTHOPAEDIC SURGERY

## 2019-08-26 PROCEDURE — 97110 THERAPEUTIC EXERCISES: CPT

## 2019-08-26 PROCEDURE — 74011250636 HC RX REV CODE- 250/636: Performed by: PHYSICIAN ASSISTANT

## 2019-08-26 PROCEDURE — 94760 N-INVAS EAR/PLS OXIMETRY 1: CPT

## 2019-08-26 PROCEDURE — 76060000031 HC ANESTHESIA FIRST 0.5 HR: Performed by: ORTHOPAEDIC SURGERY

## 2019-08-26 PROCEDURE — 74011250637 HC RX REV CODE- 250/637: Performed by: PHYSICIAN ASSISTANT

## 2019-08-26 RX ORDER — DIPHENHYDRAMINE HCL 25 MG
25 CAPSULE ORAL
Status: DISCONTINUED | OUTPATIENT
Start: 2019-08-26 | End: 2019-08-27 | Stop reason: HOSPADM

## 2019-08-26 RX ORDER — LOSARTAN POTASSIUM 50 MG/1
50 TABLET ORAL
Status: DISCONTINUED | OUTPATIENT
Start: 2019-08-26 | End: 2019-08-27 | Stop reason: HOSPADM

## 2019-08-26 RX ORDER — INSULIN GLARGINE 100 [IU]/ML
20 INJECTION, SOLUTION SUBCUTANEOUS
Status: DISCONTINUED | OUTPATIENT
Start: 2019-08-26 | End: 2019-08-27 | Stop reason: HOSPADM

## 2019-08-26 RX ORDER — HYDROMORPHONE HYDROCHLORIDE 1 MG/ML
1 INJECTION, SOLUTION INTRAMUSCULAR; INTRAVENOUS; SUBCUTANEOUS
Status: DISCONTINUED | OUTPATIENT
Start: 2019-08-26 | End: 2019-08-27 | Stop reason: HOSPADM

## 2019-08-26 RX ORDER — ROPIVACAINE HYDROCHLORIDE 5 MG/ML
INJECTION, SOLUTION EPIDURAL; INFILTRATION; PERINEURAL
Status: DISCONTINUED | OUTPATIENT
Start: 2019-08-26 | End: 2019-08-26 | Stop reason: HOSPADM

## 2019-08-26 RX ORDER — HYDROMORPHONE HYDROCHLORIDE 2 MG/1
2 TABLET ORAL
Status: DISCONTINUED | OUTPATIENT
Start: 2019-08-26 | End: 2019-08-27 | Stop reason: HOSPADM

## 2019-08-26 RX ORDER — TAMSULOSIN HYDROCHLORIDE 0.4 MG/1
0.4 CAPSULE ORAL
Status: DISCONTINUED | OUTPATIENT
Start: 2019-08-26 | End: 2019-08-27 | Stop reason: HOSPADM

## 2019-08-26 RX ORDER — LIDOCAINE HYDROCHLORIDE 20 MG/ML
INJECTION, SOLUTION EPIDURAL; INFILTRATION; INTRACAUDAL; PERINEURAL AS NEEDED
Status: DISCONTINUED | OUTPATIENT
Start: 2019-08-26 | End: 2019-08-26 | Stop reason: HOSPADM

## 2019-08-26 RX ORDER — SODIUM CHLORIDE, SODIUM LACTATE, POTASSIUM CHLORIDE, CALCIUM CHLORIDE 600; 310; 30; 20 MG/100ML; MG/100ML; MG/100ML; MG/100ML
75 INJECTION, SOLUTION INTRAVENOUS CONTINUOUS
Status: DISCONTINUED | OUTPATIENT
Start: 2019-08-26 | End: 2019-08-27 | Stop reason: HOSPADM

## 2019-08-26 RX ORDER — NALOXONE HYDROCHLORIDE 0.4 MG/ML
0.2 INJECTION, SOLUTION INTRAMUSCULAR; INTRAVENOUS; SUBCUTANEOUS AS NEEDED
Status: DISCONTINUED | OUTPATIENT
Start: 2019-08-26 | End: 2019-08-26

## 2019-08-26 RX ORDER — NALOXONE HYDROCHLORIDE 0.4 MG/ML
.2-.4 INJECTION, SOLUTION INTRAMUSCULAR; INTRAVENOUS; SUBCUTANEOUS
Status: DISCONTINUED | OUTPATIENT
Start: 2019-08-26 | End: 2019-08-27 | Stop reason: HOSPADM

## 2019-08-26 RX ORDER — OXYCODONE AND ACETAMINOPHEN 5; 325 MG/1; MG/1
1 TABLET ORAL AS NEEDED
Status: DISCONTINUED | OUTPATIENT
Start: 2019-08-26 | End: 2019-08-26

## 2019-08-26 RX ORDER — FLUTICASONE PROPIONATE 50 MCG
2 SPRAY, SUSPENSION (ML) NASAL
Status: DISCONTINUED | OUTPATIENT
Start: 2019-08-26 | End: 2019-08-27 | Stop reason: HOSPADM

## 2019-08-26 RX ORDER — SODIUM CHLORIDE 0.9 % (FLUSH) 0.9 %
5-40 SYRINGE (ML) INJECTION AS NEEDED
Status: DISCONTINUED | OUTPATIENT
Start: 2019-08-26 | End: 2019-08-26

## 2019-08-26 RX ORDER — SODIUM CHLORIDE, SODIUM LACTATE, POTASSIUM CHLORIDE, CALCIUM CHLORIDE 600; 310; 30; 20 MG/100ML; MG/100ML; MG/100ML; MG/100ML
75 INJECTION, SOLUTION INTRAVENOUS CONTINUOUS
Status: DISCONTINUED | OUTPATIENT
Start: 2019-08-26 | End: 2019-08-26

## 2019-08-26 RX ORDER — MIDAZOLAM HYDROCHLORIDE 1 MG/ML
2 INJECTION, SOLUTION INTRAMUSCULAR; INTRAVENOUS
Status: COMPLETED | OUTPATIENT
Start: 2019-08-26 | End: 2019-08-26

## 2019-08-26 RX ORDER — METFORMIN HYDROCHLORIDE 500 MG/1
1000 TABLET ORAL
Status: DISCONTINUED | OUTPATIENT
Start: 2019-08-26 | End: 2019-08-27 | Stop reason: HOSPADM

## 2019-08-26 RX ORDER — SODIUM CHLORIDE, SODIUM LACTATE, POTASSIUM CHLORIDE, CALCIUM CHLORIDE 600; 310; 30; 20 MG/100ML; MG/100ML; MG/100ML; MG/100ML
75 INJECTION, SOLUTION INTRAVENOUS CONTINUOUS
Status: DISCONTINUED | OUTPATIENT
Start: 2019-08-26 | End: 2019-08-26 | Stop reason: HOSPADM

## 2019-08-26 RX ORDER — AMOXICILLIN 250 MG
2 CAPSULE ORAL DAILY
Status: DISCONTINUED | OUTPATIENT
Start: 2019-08-27 | End: 2019-08-27 | Stop reason: HOSPADM

## 2019-08-26 RX ORDER — SODIUM CHLORIDE 0.9 % (FLUSH) 0.9 %
5-40 SYRINGE (ML) INJECTION EVERY 8 HOURS
Status: DISCONTINUED | OUTPATIENT
Start: 2019-08-26 | End: 2019-08-26

## 2019-08-26 RX ORDER — LIDOCAINE HYDROCHLORIDE 10 MG/ML
0.1 INJECTION INFILTRATION; PERINEURAL AS NEEDED
Status: DISCONTINUED | OUTPATIENT
Start: 2019-08-26 | End: 2019-08-26 | Stop reason: HOSPADM

## 2019-08-26 RX ORDER — LANOLIN ALCOHOL/MO/W.PET/CERES
1000 CREAM (GRAM) TOPICAL DAILY
Status: DISCONTINUED | OUTPATIENT
Start: 2019-08-26 | End: 2019-08-27 | Stop reason: HOSPADM

## 2019-08-26 RX ORDER — MIDAZOLAM HYDROCHLORIDE 1 MG/ML
2 INJECTION, SOLUTION INTRAMUSCULAR; INTRAVENOUS ONCE
Status: DISPENSED | OUTPATIENT
Start: 2019-08-26 | End: 2019-08-26

## 2019-08-26 RX ORDER — HYDROMORPHONE HYDROCHLORIDE 2 MG/1
2 TABLET ORAL
Qty: 40 TAB | Refills: 0 | Status: SHIPPED | OUTPATIENT
Start: 2019-08-26 | End: 2019-09-25

## 2019-08-26 RX ORDER — HYDROMORPHONE HYDROCHLORIDE 2 MG/ML
0.5 INJECTION, SOLUTION INTRAMUSCULAR; INTRAVENOUS; SUBCUTANEOUS
Status: DISCONTINUED | OUTPATIENT
Start: 2019-08-26 | End: 2019-08-26

## 2019-08-26 RX ORDER — FAMOTIDINE 20 MG/1
20 TABLET, FILM COATED ORAL 2 TIMES DAILY
Status: DISCONTINUED | OUTPATIENT
Start: 2019-08-26 | End: 2019-08-27 | Stop reason: HOSPADM

## 2019-08-26 RX ORDER — PROPOFOL 10 MG/ML
INJECTION, EMULSION INTRAVENOUS AS NEEDED
Status: DISCONTINUED | OUTPATIENT
Start: 2019-08-26 | End: 2019-08-26 | Stop reason: HOSPADM

## 2019-08-26 RX ADMIN — HYDROMORPHONE HYDROCHLORIDE 0.5 MG: 2 INJECTION INTRAMUSCULAR; INTRAVENOUS; SUBCUTANEOUS at 07:27

## 2019-08-26 RX ADMIN — LOSARTAN POTASSIUM 50 MG: 50 TABLET ORAL at 21:28

## 2019-08-26 RX ADMIN — MIDAZOLAM HYDROCHLORIDE 2 MG: 1 INJECTION, SOLUTION INTRAMUSCULAR; INTRAVENOUS at 07:02

## 2019-08-26 RX ADMIN — METFORMIN HYDROCHLORIDE 1000 MG: 500 TABLET ORAL at 17:21

## 2019-08-26 RX ADMIN — HYDROMORPHONE HYDROCHLORIDE 2 MG: 2 TABLET ORAL at 21:28

## 2019-08-26 RX ADMIN — ROPIVACAINE HYDROCHLORIDE 10 ML: 5 INJECTION, SOLUTION EPIDURAL; INFILTRATION; PERINEURAL at 07:09

## 2019-08-26 RX ADMIN — CYANOCOBALAMIN TAB 1000 MCG 1000 MCG: 1000 TAB at 09:28

## 2019-08-26 RX ADMIN — HYDROMORPHONE HYDROCHLORIDE 2 MG: 2 TABLET ORAL at 09:28

## 2019-08-26 RX ADMIN — TAMSULOSIN HYDROCHLORIDE 0.4 MG: 0.4 CAPSULE ORAL at 21:28

## 2019-08-26 RX ADMIN — ROPIVACAINE HYDROCHLORIDE 10 ML: 5 INJECTION, SOLUTION EPIDURAL; INFILTRATION; PERINEURAL at 07:06

## 2019-08-26 RX ADMIN — INSULIN GLARGINE 20 UNITS: 100 INJECTION, SOLUTION SUBCUTANEOUS at 21:28

## 2019-08-26 RX ADMIN — PROPOFOL 100 MG: 10 INJECTION, EMULSION INTRAVENOUS at 07:12

## 2019-08-26 RX ADMIN — HYDROMORPHONE HYDROCHLORIDE 1 MG: 1 INJECTION, SOLUTION INTRAMUSCULAR; INTRAVENOUS; SUBCUTANEOUS at 13:13

## 2019-08-26 RX ADMIN — FAMOTIDINE 20 MG: 20 TABLET, FILM COATED ORAL at 09:28

## 2019-08-26 RX ADMIN — SODIUM CHLORIDE, SODIUM LACTATE, POTASSIUM CHLORIDE, AND CALCIUM CHLORIDE 75 ML/HR: 600; 310; 30; 20 INJECTION, SOLUTION INTRAVENOUS at 05:49

## 2019-08-26 RX ADMIN — LIDOCAINE HYDROCHLORIDE 60 MG: 20 INJECTION, SOLUTION EPIDURAL; INFILTRATION; INTRACAUDAL; PERINEURAL at 07:12

## 2019-08-26 RX ADMIN — FAMOTIDINE 20 MG: 20 TABLET, FILM COATED ORAL at 17:21

## 2019-08-26 RX ADMIN — SODIUM CHLORIDE, SODIUM LACTATE, POTASSIUM CHLORIDE, AND CALCIUM CHLORIDE: 600; 310; 30; 20 INJECTION, SOLUTION INTRAVENOUS at 07:09

## 2019-08-26 RX ADMIN — HYDROMORPHONE HYDROCHLORIDE 2 MG: 2 TABLET ORAL at 17:22

## 2019-08-26 NOTE — PROGRESS NOTES
08/26/19 1512   Oxygen Therapy   O2 Sat (%) 96 %   Pulse via Oximetry 59 beats per minute   O2 Device Room air   O2 Flow Rate (L/min) 0 l/min   Patient achieved  2500  Ml/sec on IS. Patient encouraged to do 10 breaths every hour while awake-patient agreed and demonstrated. No shortness of breath or distress noted. BS are clear b/l. [facility] notes reviewed- no recommendations noted.

## 2019-08-26 NOTE — ANESTHESIA PROCEDURE NOTES
Left Femoral Nerve Block    Start time: 8/26/2019 7:07 AM  End time: 8/26/2019 7:09 AM  Performed by: Bart Marques MD  Authorized by: Bart Marques MD       Pre-procedure: Indications: post-op pain management    Preanesthetic Checklist: patient identified, risks and benefits discussed, site marked, timeout performed, anesthesia consent given and patient being monitored  Preanesthetic Checklist comment:  Time out at    Block Type:   Block Type:  Femoral single shot  Laterality:  Left  Monitoring:  Responsive to questions, standard ASA monitoring, continuous pulse ox, oxygen, frequent vital sign checks and heart rate  Injection Technique:  Single shot  Prep: chlorhexidine    Location:  Mid thigh  Needle Type:  Stimuplex  Needle Gauge:  21 G  Needle Localization:  Anatomical landmarks and ultrasound guidance    Assessment:  Number of attempts:  1  Injection Assessment:  Incremental injection every 5 mL, no paresthesia, ultrasound image on chart, local visualized surrounding nerve on ultrasound, negative aspiration for blood and no intravascular symptoms  Patient tolerance:  Patient tolerated the procedure well with no immediate complications  The relevant block area was scanned before, during, and after the local anesthetic injection and no gross abnormalities were observed.

## 2019-08-26 NOTE — PROGRESS NOTES
TRANSFER - IN REPORT:    Verbal report received from [facility]) on Austin Cleveland  being received from [employer]) for routine post - op      Report consisted of patients Situation, Background, Assessment and   Recommendations(SBAR). Information from the following report(s) SBAR, Kardex, OR Summary, Intake/Output and MAR was reviewed with the receiving nurse. Opportunity for questions and clarification was provided. Assessment to be  completed upon patients arrival to unit and care assumed.
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
## 2019-08-26 NOTE — ANESTHESIA POSTPROCEDURE EVALUATION
Procedure(s):  MANIPULATION LEFT KNEE.    total IV anesthesia    Anesthesia Post Evaluation      Multimodal analgesia: multimodal analgesia used between 6 hours prior to anesthesia start to PACU discharge  Patient location during evaluation: PACU  Patient participation: complete - patient participated  Level of consciousness: awake and alert  Pain management: adequate  Airway patency: patent  Anesthetic complications: no  Cardiovascular status: acceptable  Respiratory status: acceptable  Hydration status: acceptable  Post anesthesia nausea and vomiting:  none      Vitals Value Taken Time   /90 8/26/2019  7:49 AM   Temp     Pulse 68 8/26/2019  7:49 AM   Resp 16 8/26/2019  7:49 AM   SpO2 98 % 8/26/2019  7:49 AM

## 2019-08-26 NOTE — OP NOTES
[facility]  Closed Manipulation of Total Knee Arthroplasty    Patient:Chris Freitas   : 1950  Medical Record [id number]  Pre-operative Diagnosis:  Knee stiff, left [M25.662]   Post-operative Diagnosis: Knee stiff, left [M25.662]  Location: [address] [place] [address]  Surgeon: Nilo Rivera MD    Anesthesia: Regional  EBL: none    Procedure: The patient was brought to the PACU. A adductor canal block was administered. The patient the received general anesthesia after a timeout was done. A closed manipulation was performed on the left knee. Preoperatively the ROM was 5 to 110 degrees. The knee was manipulated. Upon completion the ROM was 0 to 125degrees. Ligaments were stable. Extensor mechanism was intact. Ice was applied to the knee.  The patient tolerated the procedure w/o difficulty         Signed By: Nilo Rivera MD  2019,  7:23 AM

## 2019-08-26 NOTE — PROGRESS NOTES
PT seen pt post op left knee manipulation, CPM applied -5 deg extension & 110 deg flex ( full range machine allows ) without complication. PT to follow up later to evaluate & exercise with pt.   Sheila Diaz, PT, MARICRUZ

## 2019-08-26 NOTE — PROGRESS NOTES
Problem: Mobility Impaired (Adult and Pediatric)  Goal: *Acute Goals and Plan of Care (Insert Text)  Description  GOALS (1-4 days):  (1.)Mr. Estephanie Costa will move from supine to sit and sit to supine  in bed with INDEPENDENT. (2.)Mr. Estephanie Costa will transfer from bed to chair and chair to bed with SUPERVISION using the least restrictive device. (3.)Mr. Estephanie Costa will ambulate with SUPERVISION for 300 feet with the least restrictive device. (4.)Mr. Estephanie Costa will ambulate up/down 1 steps with no railing with STAND BY ASSIST with walker. (5.)Mr. Estephanie Costa will increase left knee ROM to 3°-115°.  ________________________________________________________________________________________________   Outcome: Progressing Towards Goal     PHYSICAL THERAPY JOINT CAMP TKA: Initial Assessment, Treatment Day: Day of Assessment and PM 8/26/2019  OBSERVATION: Hospital Day: 1  Payor: [employer] [place]ARE / Plan: Radha Clark OF [place] MED[place]/PPO / Product Type: Managed Care Medicare /      NAME/AGE/GENDER: Radames Morgan is a 81 y.o. male   PRIMARY DIAGNOSIS:  Knee stiff, left [M25.662]   Procedure(s) and Anesthesia Type:     * MANIPULATION LEFT KNEE - Regional (Left)  ICD-10: Treatment Diagnosis:    · Pain in Left Knee (M25.562)  · Stiffness of Left Knee, Not elsewhere classified (M25.662)  · Difficulty in walking, Not elsewhere classified (R26.2)      ASSESSMENT:     Mr. Estephanie Costa presents with stiffness & edema at left knee s/p manipulation. Pt was buckling with WB activity, PT suggested for pt to use a rolling walker until PT follow up. PT assisted with aggressive PROM to left knee in flex & ext along with performing exercises. PT to follow up in am prior to out pt PT appointment at 11:00am.    This section established at most recent assessment   PROBLEM LIST (Impairments causing functional limitations):  1. Decreased Strength  2. Decreased ADL/Functional Activities  3. Decreased Transfer Abilities  4.  Decreased Ambulation Ability/Technique  5. Decreased Balance  6. Increased Pain  7. Decreased Activity Tolerance  8. Decreased Flexibility/Joint Mobility  9. Decreased [place] with Home Exercise Program   INTERVENTIONS PLANNED: (Benefits and precautions of physical therapy have been discussed with the patient.)  1. bed mobility  2. gait training  3. home exercise program (HEP)  4. Range of Motion: active/assisted/passive  5. Therapeutic Activities  6. therapeutic exercise/strengthening  7. transfer training  8. Group Therapy     TREATMENT PLAN: Frequency/Duration: Follow patient BID for duration of hospital stay to address above goals. Rehabilitation Potential For Stated Goals: Good     RECOMMENDED REHABILITATION/EQUIPMENT: (at time of discharge pending progress): Continue Skilled Therapy and Outpatient: Physical Therapy. HISTORY:   History of Present Injury/Illness (Reason for Referral): Manipulation left knee  Past Medical History/Comorbidities:   Mr. Frankie An  has a past medical history of Arthritis, BPH (benign prostatic hyperplasia), Diabetes ([facility] [address] GERD (gastroesophageal reflux disease), Hepatitis C, HTN (hypertension), Prostate cancer ([facility] [address] Sepsis ([facility] [address] (04/2018), and Status post total knee replacement, left (6/12/2019). He also has no past medical history of Adverse effect of anesthesia, Difficult intubation, Malignant hyperthermia due to anesthesia, Nausea & vomiting, or Pseudocholinesterase deficiency. Mr. Frankie An  has a past surgical history that includes hx other surgical (03/30/2018); hx orthopaedic (1972); hx knee arthroscopy (Left, 1994); hx hemorrhoidectomy (1973); hx colonoscopy (2016); hx heart catheterization (2001); and hx knee replacement (Left, 06/12/2019).   Social History/Living Environment:   Home Environment: Private residence  # Steps to Enter: 1  Rails to Enter: No  One/Two Story Residence: One story  Living Alone: Yes(but family to assist)  Support Systems: Child(mick)  Patient Expects to be Discharged to[de-identified] Private residence  Current DME Used/Available at Home: Commode, bedside, Walker, rolling  Tub or Shower Type: Tub/Shower combination  Prior Level of Function/Work/Activity:  Pt was functioning with a cane prior to this admission. Number of Personal Factors/Comorbidities that affect the Plan of Care: 3+: HIGH COMPLEXITY   EXAMINATION:   Most Recent Physical Functioning:   Gross Assessment: Yes  Gross Assessment  AROM: Generally decreased, functional(right LE)  Strength: Generally decreased, functional(right LE)  Coordination: Generally decreased, functional(right LE)           LLE PROM  L Knee Flexion: 104  L Knee Extension: -3         Bed Mobility  Supine to Sit: Stand-by assistance  Sit to Supine: Stand-by assistance  Scooting: Stand-by assistance    Transfers  Sit to Stand: Minimum assistance  Stand to Sit: Minimum assistance  Bed to Chair: Minimum assistance    Balance  Sitting: Intact; Without support  Standing: Impaired; With support(walker)              Weight Bearing Status  Left Side Weight Bearing: As tolerated  Distance (ft): 40 Feet (ft)  Ambulation - Level of Assistance: Minimal assistance  Assistive Device: Walker, rolling  Speed/Tamar: Delayed  Step Length: Right shortened  Stance: Left decreased  Gait Abnormalities: Antalgic;Decreased step clearance    CPM re-applied after eval & therapeutic exercise, ROM in [id number] deg flex & -5 deg ext without difficulty    Braces/Orthotics: none    Left Knee Cold  Type: Cryocuff      Body Structures Involved:  1. Joints  2. Muscles Body Functions Affected:  1. Sensory/Pain  2. Movement Related Activities and Participation Affected:  1. General Tasks and Demands  2.  Mobility   Number of elements that affect the Plan of Care: 4+: HIGH COMPLEXITY   CLINICAL PRESENTATION:   Presentation: Stable and uncomplicated: LOW COMPLEXITY   CLINICAL DECISION MAKIN \[facility]\"" [address] AM-PAC 6 Clicks   Basic Mobility Inpatient Short Form  How much difficulty does the patient currently have. .. Unable A Lot A Little None   1. Turning over in bed (including adjusting bedclothes, sheets and blankets)? ? 1   ? 2   ? 3   ? 4   2. Sitting down on and standing up from a chair with arms ( e.g., wheelchair, bedside commode, etc.)   ? 1   ? 2   ? 3   ? 4   3. Moving from lying on back to sitting on the side of the bed?   ? 1   ? 2   ? 3   ? 4   How much help from another person does the patient currently need. .. Total A Lot A Little None   4. Moving to and from a bed to a chair (including a wheelchair)? ? 1   ? 2   ? 3   ? 4   5. Need to walk in hospital room? ? 1   ? 2   ? 3   ? 4   6. Climbing 3-5 steps with a railing? ? 1   ? 2   ? 3   ? 4   © 2007, Trustees of [address] [place] [address], under license to [employer]. All rights reserved     Score:  Initial: 16 Most Recent: X (Date: -- )    Interpretation of Tool:  Represents activities that are increasingly more difficult (i.e. Bed mobility, Transfers, Gait). Medical Necessity:     · Patient is expected to demonstrate progress in strength, range of motion, balance, coordination and functional technique  ·  to decrease assistance required with bed mobility, transfers & gait   · .  Reason for Services/Other Comments:  · Patient continues to require skilled intervention due to pt not at his full functional potential   · .    Use of outcome tool(s) and clinical judgement create a POC that gives a: Clear prediction of patient's progress: LOW COMPLEXITY            TREATMENT:   (In addition to Assessment/Re-Assessment sessions the following treatments were rendered)     Pre-treatment Symptoms/Complaints:  sore & stiff knee  Pain: Initial: numeric scale  Pain Intensity 1: 6  Pain Location 1: Knee  Pain Orientation 1: Left  Pain Intervention(s) 1: Ambulation/Increased Activity, Cold pack, Exercise  Post Session:  6/10     Therapeutic Exercise: (15 Minutes):  Exercises per grid below to improve mobility and dynamic movement of leg - left to improve functional range . Required minimal verbal cues to promote proper body alignment and promote proper body mechanics. Progressed range and repetitions as indicated. Assessment/ 15 min      Date:  8/27 Date:   Date:     ACTIVITY/EXERCISE AM PM AM PM AM PM   GROUP THERAPY  ?  ?  ?  ?  ?  ? Ankle Pumps  20       Quad Sets  20       Gluteal Sets  20       Hip ABd/ADduction  20       Straight Leg Raises  20       Knee Slides  20       Short Arc Quads  20       Long Arc Quads         Chair Slides  20                B = bilateral; AA = active assistive; A = active; P = passive      Treatment/Session Assessment:     Response to Treatment:  tolerated fair    Education:  ? Home Exercises  ? Fall Precautions  ? Hip Precautions ? D/C Instruction Review  ? Knee/Hip Prosthesis Review  ? Walker Management/Safety ? Adaptive Equipment as Needed       Interdisciplinary Collaboration:   o Registered Nurse    After treatment position/precautions:   o Up in chair  o Bed/Chair-wheels locked  o Bed in low position  o Caregiver at bedside  o Call light within reach  o RN notified  o Family at bedside  o CPM in place     Compliance with Program/Exercises: Compliant all of the time. Recommendations/Intent for next treatment session:  Treatment next visit will focus on increasing Mr. Freitas's independence with bed mobility, transfers, gait training, strength/ROM exercises, modalities for pain, and patient education.       Total Treatment Duration:  PT Patient Time In/Time Out  Time In: 1300  Time Out: 1330    Brigette Landry PT

## 2019-08-26 NOTE — PERIOP NOTES
TRANSFER - OUT REPORT:    Verbal report given to Jayshree RN(name) on Hailey Joaquin  being transferred to [facility](unit) for routine progression of care       Report consisted of patients Situation, Background, Assessment and   Recommendations(SBAR). Information from the following report(s) SBAR, Kardex, Procedure Summary, MAR and Cardiac Rhythm NSR was reviewed with the receiving nurse. Lines:   Peripheral IV 08/26/19 Right Forearm (Active)   Site Assessment Clean, dry, & intact 8/26/2019  7:20 AM   Phlebitis Assessment 0 8/26/2019  7:20 AM   Infiltration Assessment 0 8/26/2019  7:20 AM   Dressing Status Clean, dry, & intact 8/26/2019  7:20 AM   Dressing Type Transparent;Tape 8/26/2019  7:20 AM   Hub Color/Line Status Infusing;Green 8/26/2019  7:20 AM        Opportunity for questions and clarification was provided.       Patient transported with:   room air

## 2019-08-26 NOTE — ANESTHESIA PREPROCEDURE EVALUATION
Relevant Problems   No relevant active problems       Anesthetic History   No history of anesthetic complications            Review of Systems / Medical History  Patient summary reviewed, nursing notes reviewed and pertinent labs reviewed    Pulmonary  Within defined limits                 Neuro/Psych   Within defined limits           Cardiovascular    Hypertension: well controlled              Exercise tolerance: >4 METS     GI/Hepatic/Renal     GERD: well controlled           Endo/Other    Diabetes: well controlled, type 2, using insulin    Arthritis     Other Findings              Physical Exam    Airway  Mallampati: II  TM Distance: 4 - 6 cm  Neck ROM: normal range of motion   Mouth opening: Normal     Cardiovascular  Regular rate and rhythm,  S1 and S2 normal,  no murmur, click, rub, or gallop  Rhythm: regular           Dental  No notable dental hx       Pulmonary  Breath sounds clear to auscultation               Abdominal  GI exam deferred       Other Findings            Anesthetic Plan    ASA: 3  Anesthesia type: total IV anesthesia      Post-op pain plan if not by surgeon: peripheral nerve block single    Induction: Intravenous  Anesthetic plan and risks discussed with: Patient

## 2019-08-26 NOTE — ANESTHESIA PROCEDURE NOTES
Left Sciatic Nerve Block (No ultrasound)    Start time: 8/26/2019 7:01 AM  End time: 8/26/2019 7:06 AM  Performed by: Joan Umaña MD  Authorized by: Joan Umaña MD       Pre-procedure:    Indications: at surgeon's request and post-op pain management    Preanesthetic Checklist: patient identified, risks and benefits discussed, site marked, timeout performed, anesthesia consent given and patient being monitored    Timeout Time: 07:01          Block Type:   Block Type:  Sciatic single shot  Laterality:  Left  Monitoring:  Standard ASA monitoring, continuous pulse ox, frequent vital sign checks, heart rate, responsive to questions and oxygen  Injection Technique:  Single shot  Procedures: nerve stimulator    Prep: chlorhexidine    : Trans gluteal.  Needle Type:  Stimuplex  Needle Gauge:  21 G  Needle Localization:  Nerve stimulator  Motor Response: minimal motor response >0.4 mA    Motor Response comment:  Confirmed loss of response below 0.4 mA    Assessment:  Number of attempts:  1  Injection Assessment:  Incremental injection every 5 mL, no paresthesia, ultrasound image on chart, local visualized surrounding nerve on ultrasound, negative aspiration for blood, no intravascular symptoms and negative aspiration for CSF  Patient tolerance:  Patient tolerated the procedure well with no immediate complications

## 2019-08-27 ENCOUNTER — APPOINTMENT (OUTPATIENT)
Dept: PHYSICAL THERAPY | Age: 69
End: 2019-08-27
Payer: MEDICARE

## 2019-08-27 ENCOUNTER — APPOINTMENT (OUTPATIENT)
Dept: GENERAL RADIOLOGY | Age: 69
End: 2019-08-27
Attending: ORTHOPAEDIC SURGERY
Payer: MEDICARE

## 2019-08-27 ENCOUNTER — HOSPITAL ENCOUNTER (OUTPATIENT)
Dept: PHYSICAL THERAPY | Age: 69
Discharge: HOME | End: 2019-08-27
Payer: MEDICARE

## 2019-08-27 VITALS
HEIGHT: 75 IN | OXYGEN SATURATION: 95 % | WEIGHT: 211.3 LBS | SYSTOLIC BLOOD PRESSURE: 114 MMHG | HEART RATE: 82 BPM | DIASTOLIC BLOOD PRESSURE: 67 MMHG | BODY MASS INDEX: 26.27 KG/M2 | RESPIRATION RATE: 16 BRPM | TEMPERATURE: 98.1 F

## 2019-08-27 PROCEDURE — 74011250636 HC RX REV CODE- 250/636: Performed by: PHYSICIAN ASSISTANT

## 2019-08-27 PROCEDURE — 97116 GAIT TRAINING THERAPY: CPT

## 2019-08-27 PROCEDURE — 97140 MANUAL THERAPY 1/> REGIONS: CPT

## 2019-08-27 PROCEDURE — 97110 THERAPEUTIC EXERCISES: CPT

## 2019-08-27 PROCEDURE — 74011250637 HC RX REV CODE- 250/637: Performed by: PHYSICIAN ASSISTANT

## 2019-08-27 PROCEDURE — 73560 X-RAY EXAM OF KNEE 1 OR 2: CPT

## 2019-08-27 PROCEDURE — 99218 HC RM OBSERVATION: CPT

## 2019-08-27 RX ADMIN — SENNOSIDES AND DOCUSATE SODIUM 2 TABLET: 8.6; 5 TABLET ORAL at 08:19

## 2019-08-27 RX ADMIN — HYDROMORPHONE HYDROCHLORIDE 2 MG: 2 TABLET ORAL at 01:39

## 2019-08-27 RX ADMIN — HYDROMORPHONE HYDROCHLORIDE 2 MG: 2 TABLET ORAL at 10:30

## 2019-08-27 RX ADMIN — HYDROMORPHONE HYDROCHLORIDE 1 MG: 1 INJECTION, SOLUTION INTRAMUSCULAR; INTRAVENOUS; SUBCUTANEOUS at 00:36

## 2019-08-27 RX ADMIN — HYDROMORPHONE HYDROCHLORIDE 2 MG: 2 TABLET ORAL at 05:45

## 2019-08-27 RX ADMIN — FAMOTIDINE 20 MG: 20 TABLET, FILM COATED ORAL at 08:19

## 2019-08-27 RX ADMIN — CYANOCOBALAMIN TAB 1000 MCG 1000 MCG: 1000 TAB at 08:19

## 2019-08-27 NOTE — PROGRESS NOTES
Initial visit with patient by [address] [place] [address]. Card left. Shubham Collins M.Div.

## 2019-08-27 NOTE — PROGRESS NOTES
Problem: Mobility Impaired (Adult and Pediatric)  Goal: *Acute Goals and Plan of Care (Insert Text)  Description  GOALS (1-4 days):  (1.)Mr. Anders Mccauley will move from supine to sit and sit to supine  in bed with INDEPENDENT. (2.)Mr. Anders Mccauley will transfer from bed to chair and chair to bed with SUPERVISION using the least restrictive device. (3.)Mr. Anders Mccauley will ambulate with SUPERVISION for 300 feet with the least restrictive device. (4.)Mr. Anders Mccauley will ambulate up/down 1 steps with no railing with STAND BY ASSIST with walker. (5.)Mr. Anders Mccauley will increase left knee ROM to 3°-115°.  ________________________________________________________________________________________________   Outcome: Progressing Towards Goal     PHYSICAL THERAPY JOINT CAMP TKA: Daily Note, Treatment Day: 1st and AM 8/27/2019  OBSERVATION: Hospital Day: 2  Payor: [facility] [place] MEDICARE / Plan: [employer] [place] MEDICARE H[place]/PPO / Product Type: Managed Care Medicare /      NAME/AGE/GENDER: Navin Muller is a 81 y.o. male   PRIMARY DIAGNOSIS:  Knee stiff, left [M25.662]   Procedure(s) and Anesthesia Type:     * MANIPULATION LEFT KNEE - Regional (Left)  ICD-10: Treatment Diagnosis:    · Pain in Left Knee (M25.562)  · Stiffness of Left Knee, Not elsewhere classified (M25.662)  · Difficulty in walking, Not elsewhere classified (R26.2)      ASSESSMENT:     Mr. Anders Mccauley presents with stiffness & edema at left knee s/p manipulation. Patient demonstrated increased gait distance this morning. Performed LE exercises as below. Placed in CPM set at -5 to 110 after therapy session. AROM L knee decreased today as compared to yesterday. Progressing toward goals. Scheduled for discharge today and will attend OPPT today at [facility]. This section established at most recent assessment   PROBLEM LIST (Impairments causing functional limitations):  1. Decreased Strength  2. Decreased ADL/Functional Activities  3. Decreased Transfer Abilities  4.  Decreased Ambulation Ability/Technique  5. Decreased Balance  6. Increased Pain  7. Decreased Activity Tolerance  8. Decreased Flexibility/Joint Mobility  9. Decreased [place] with Home Exercise Program   INTERVENTIONS PLANNED: (Benefits and precautions of physical therapy have been discussed with the patient.)  1. bed mobility  2. gait training  3. home exercise program (HEP)  4. Range of Motion: active/assisted/passive  5. Therapeutic Activities  6. therapeutic exercise/strengthening  7. transfer training  8. Group Therapy     TREATMENT PLAN: Frequency/Duration: Follow patient BID for duration of hospital stay to address above goals. Rehabilitation Potential For Stated Goals: Good     RECOMMENDED REHABILITATION/EQUIPMENT: (at time of discharge pending progress): Continue Skilled Therapy and Outpatient: Physical Therapy. HISTORY:   History of Present Injury/Illness (Reason for Referral): Manipulation left knee  Past Medical History/Comorbidities:   Mr. Jennie Rouse  has a past medical history of Arthritis, BPH (benign prostatic hyperplasia), Diabetes ([facility] [address] GERD (gastroesophageal reflux disease), Hepatitis C, HTN (hypertension), Prostate cancer ([facility] [address] Sepsis ([facility] [address] (04/2018), and Status post total knee replacement, left (6/12/2019). He also has no past medical history of Adverse effect of anesthesia, Difficult intubation, Malignant hyperthermia due to anesthesia, Nausea & vomiting, or Pseudocholinesterase deficiency. Mr. Jennie Rouse  has a past surgical history that includes hx other surgical (03/30/2018); hx orthopaedic (1972); hx knee arthroscopy (Left, 1994); hx hemorrhoidectomy (1973); hx colonoscopy (2016); hx heart catheterization (2001); and hx knee replacement (Left, 06/12/2019).   Social History/Living Environment:   Home Environment: Private residence  # Steps to Enter: 1  Rails to Enter: No  One/Two Story Residence: One story  Living Alone: Yes  Support Systems: Child(mick)  Patient Expects to be Discharged to[de-identified] Private residence  Current DME Used/Available at Home: Commode, bedside, Walker, rolling  Tub or Shower Type: Tub/Shower combination  Prior Level of Function/Work/Activity:  Pt was functioning with a cane prior to this admission. Number of Personal Factors/Comorbidities that affect the Plan of Care: 3+: HIGH COMPLEXITY   EXAMINATION:   Most Recent Physical Functioning:                  LLE PROM  L Knee Flexion: 95  L Knee Extension: -10         Bed Mobility  Supine to Sit: Stand-by assistance  Sit to Supine: Stand-by assistance  Scooting: Stand-by assistance    Transfers  Sit to Stand: Contact guard assistance  Stand to Sit: Contact guard assistance  Bed to Chair: Contact guard assistance    Balance  Sitting: Intact  Standing: With support              Weight Bearing Status  Left Side Weight Bearing: As tolerated  Distance (ft): 200 Feet (ft)  Ambulation - Level of Assistance: Contact guard assistance  Assistive Device: Walker, rolling  Speed/Tamar: Delayed; Fluctuations  Stance: Left decreased  Gait Abnormalities: Antalgic;Decreased step clearance  Interventions: Manual cues; Safety awareness training;Verbal cues  Braces/Orthotics: none    Left Knee Cold  Type: Cryocuff      Body Structures Involved:  1. Joints  2. Muscles Body Functions Affected:  1. Sensory/Pain  2. Movement Related Activities and Participation Affected:  1. General Tasks and Demands  2. Mobility   Number of elements that affect the Plan of Care: 4+: HIGH COMPLEXITY   CLINICAL PRESENTATION:   Presentation: Stable and uncomplicated: LOW COMPLEXITY   CLINICAL DECISION MAKING:   [facility] AM-PAC 6 Clicks   Basic Mobility Inpatient Short Form  How much difficulty does the patient currently have. .. Unable A Lot A Little None   1. Turning over in bed (including adjusting bedclothes, sheets and blankets)? ? 1   ? 2   ? 3   ? 4   2.   Sitting down on and standing up from a chair with arms ( e.g., wheelchair, bedside commode, etc.)   ? 1   ? 2   ? 3   ? 4   3. Moving from lying on back to sitting on the side of the bed?   ? 1   ? 2   ? 3   ? 4   How much help from another person does the patient currently need. .. Total A Lot A Little None   4. Moving to and from a bed to a chair (including a wheelchair)? ? 1   ? 2   ? 3   ? 4   5. Need to walk in hospital room? ? 1   ? 2   ? 3   ? 4   6. Climbing 3-5 steps with a railing? ? 1   ? 2   ? 3   ? 4   © 2007, Trustees of [address] [place] [address] [address] [address], under license to [employer]. All rights reserved     Score:  Initial: 16 Most Recent: X (Date: -- )    Interpretation of Tool:  Represents activities that are increasingly more difficult (i.e. Bed mobility, Transfers, Gait). Medical Necessity:     · Patient is expected to demonstrate progress in strength, range of motion, balance, coordination and functional technique  ·  to decrease assistance required with bed mobility, transfers & gait   · .  Reason for Services/Other Comments:  · Patient continues to require skilled intervention due to pt not at his full functional potential   · . Use of outcome tool(s) and clinical judgement create a POC that gives a: Clear prediction of patient's progress: LOW COMPLEXITY            TREATMENT:   (In addition to Assessment/Re-Assessment sessions the following treatments were rendered)     Pre-treatment Symptoms/Complaints:  sore & stiff knee  Pain: Initial: numeric scale  Pain Intensity 1: 7  Pain Location 1: Knee  Pain Orientation 1: Left  Pain Intervention(s) 1: Cold pack, Repositioned  Post Session:  6/10     Therapeutic Exercise: (15 Minutes):  Exercises per grid below to improve mobility and dynamic movement of leg - left to improve functional range . Required minimal verbal cues to promote proper body alignment and promote proper body mechanics. Progressed range and repetitions as indicated.  Gait Training (15 Minutes):  Gait training to improve and/or restore physical functioning as related to mobility, strength and dynamic movement of knee - left to improve functional gait pattern. Ambulated 200 Feet (ft) with Contact guard assistance using a Walker, rolling and minimal Manual cues; Safety awareness training;Verbal cues related to their knee position and motion to promote proper body mechanics. Date:  8/26 Date:  8/27 Date:     ACTIVITY/EXERCISE AM PM AM PM AM PM   GROUP THERAPY  ?  ?  ?  ?  ?  ? Ankle Pumps  20 20      Quad Sets  20 20      Gluteal Sets  20 20      Hip ABd/ADduction  20 20      Straight Leg Raises  20 20      Knee Slides  20 20      Short Arc Quads  20 20      Long Arc Quads         Chair Slides  20 20 AA               B = bilateral; AA = active assistive; A = active; P = passive      Treatment/Session Assessment:     Response to Treatment:  tolerated fair    Education:  ? Home Exercises  ? Fall Precautions  ? Hip Precautions ? D/C Instruction Review  ? Knee/Hip Prosthesis Review  ? Walker Management/Safety ? Adaptive Equipment as Needed       Interdisciplinary Collaboration:   o Physical Therapist  o Registered Nurse    After treatment position/precautions:   o Supine in bed  o Bed/Chair-wheels locked  o Bed in low position  o Call light within reach  o CPM in place    Compliance with Program/Exercises: Compliant all of the time. Recommendations/Intent for next treatment session:  Treatment next visit will focus on increasing Mr. Freitas's independence with bed mobility, transfers, gait training, strength/ROM exercises, modalities for pain, and patient education.       Total Treatment Duration:  PT Patient Time In/Time Out  Time In: 0915  Time Out: [address], PT

## 2019-08-27 NOTE — PROGRESS NOTES
Jessica Parkinson  : 1950  Payor[place] Kostas Avila [place] / Plan: [facility] [place] / Product Type: Managed Care Medicare /  [address] [place] [address]  at [facility]  [address], [address]  [phone]   [phone]      OUTPATIENT PHYSICAL THERAPY: Daily Treatment Note 2019  Visit Count:  1  ICD-10: Treatment Diagnosis:   R26.2 Difficulty in walking, not elsewhere classified      M25.662 Stiffness of left knee, not elsewhere classified      M25.562 Pain in left knee       Pre-treatment Symptoms/Complaints:  Pain and stiffness of L knee. Weakness of LLE. Fair gait   Pain: Initial: Pain Intensity 1: 7  Post Session:  5/10   Medications Last Reviewed:  2019    Updated Objective Findings:  Sitting knee flexion 115 after stretching    TREATMENT:   THERAPEUTIC EXERCISE: (15  minutes):  Exercises per grid below to improve mobility, strength and gait and pain . Required moderate verbal and manual cues to promote proper body alignment, promote proper body posture and promote proper body mechanics. Progressed resistance, range and repetitions as indicated. Instructed in HS and HC stretching. Instructed in use of QS (quadriceps sets) and LAQ's (long arc quadriceps). Manual Therapy ( 40 min   ): Patella and patella tendon mobs. Soft tissue work to [employer] and HS. PROM for knee flex and extn. Therapeutic Modalities (     ):        HEP: As above; handouts given to patient for all exercises. Treatment/Session Summary:   Pt presents with decreased ROM and strength of L knee and LE post L TKA on 19 and manipulation on 19. Pt was on CPM post manipulation until DC. Pt is referred for aggressive ROM of L knee. Pt with tightness in both full ext and flexion.  reports good flexion and ext during manipulation. Good increase in flexion during treatment today. Will see 3 visits this week and then 2 x week.     · Response to Treatment:  Understood exercises. Increased motion . · Communication/Consultation:  None today  · Equipment provided today:  None today  · Recommendations/Intent for next treatment session: Next visit will focus on aggressive ROM and strengthening of L knee and LE. Gait training .   Treatment Plan of Care Effective Dates:  8/27/19 to 11/25/19  Total Treatment Billable Duration:  55 min   PT Patient Time In/Time Out  Time In: 1100  Time Out: [address], PT    Future Appointments   Date Time Provider Maira Correa   8/29/2019 11:00 AM Mary Fried PT [facility]

## 2019-08-27 NOTE — DISCHARGE INSTRUCTIONS
[facility]   Patient Discharge Instructions    Griffin Banks / [id number] : 1950    Admitted 2019 Discharged: 2019         IF YOU HAVE ANY PROBLEMS ONCE YOU ARE AT HOME CALL THE FOLLOWING NUMBERS:   Main office number: [phone]  Medications    · The medications you are to continue on are listed on the medication reconciliation sheet. · It is important that you take the medication exactly as they are prescribed. · Medications which increase your risk of blood clots are listed to stop for 5 weeks after surgery as well as medications or supplements which increase your risk of bleeding complications. · Keep your medication in the bottles provided by the pharmacist and keep a list of the medication names, dosages, and times to be taken in your wallet. · Do not take other medications without consulting your doctor. What to do at [address] [place] [address] Ave your prehospital diet.  If you have excessive nausea or vomitting call your doctor's office     Continue Physical Therapy Exercises    Do not drive if taking narcotic pain medication       When to Call    - Call if you have a temperature greater then 101  - Are unable to bear any wieght   - Need a pain medication refill   - The dressing becomes saturated       DISCHARGE SUMMARY from Nurse    The following personal items collected during your admission are returned to you:   Dental Appliance: Dental Appliances: Partials, Uppers  Vision: Visual Aid: Glasses  Hearing Aid:    Jewelry: Jewelry: None  Clothing:    Other Valuables:    Valuables sent to safe:      PATIENT INSTRUCTIONS:    After general anesthesia or intravenous sedation, for 24 hours or while taking prescription Narcotics:  · Limit your activities  · Do not drive and operate hazardous machinery  · Do not make important personal or business decisions  · Do  not drink alcoholic beverages  · If you have not urinated within 8 hours after discharge, please contact your surgeon on call. Report the following to your surgeon:  · Excessive pain, swelling, redness or odor of or around the surgical area  · Temperature over 101  · Nausea and vomiting lasting longer than 4 hours or if unable to take medications  · Any signs of decreased circulation or nerve impairment to extremity: change in color, persistent  numbness, tingling, coldness or increase pain  · Any questions, call office @ [phone]      Keep scheduled follow up appointment. If need to change, call office @ [phone]. *  Please give a list of your current medications to your Primary Care Provider. *  Please update this list whenever your medications are discontinued, doses are      changed, or new medications (including over-the-counter products) are added. *  Please carry medication information at all times in case of emergency situations. These are general instructions for a healthy lifestyle:    No smoking/ No tobacco products/ Avoid exposure to second hand smoke    Surgeon General's Warning:  Quitting smoking now greatly reduces serious risk to your health. Obesity, smoking, and sedentary lifestyle greatly increases your risk for illness    A healthy diet, regular physical exercise & weight monitoring are important for maintaining a healthy lifestyle    You may be retaining fluid if you have a history of heart failure or if you experience any of the following symptoms:  Weight gain of 3 pounds or more overnight or 5 pounds in a week, increased swelling in our hands or feet or shortness of breath while lying flat in bed. Please call your doctor as soon as you notice any of these symptoms; do not wait until your next office visit. Recognize signs and symptoms of STROKE:    F-face looks uneven    A-arms unable to move or move even    S-speech slurred or non-existent    T-time-call 911 as soon as signs and symptoms begin-DO NOT go       Back to bed or wait to see if you get better-TIME IS BRAIN.         The discharge information has been reviewed with the patient. The patient verbalized understanding. Information obtained by :  I understand that if any problems occur once I am at home I am to contact my physician. I understand and acknowledge receipt of the instructions indicated above.                                                                                                                                            Physician's or R.N.'s Signature                                                                  Date/Time                                                                                                                                              Patient or Representative Signature                                                          Date/Time

## 2019-08-27 NOTE — PROGRESS NOTES
Pt in bed with family member present. Alert and Oriented x3. Denies pain. No NV deficits noted. +2 pedal pulses. Bed in low and locked position. Call light within reach.
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
## 2019-08-27 NOTE — PROGRESS NOTES
2019         Post Op day: 1 Day Post-OpProcedure(s) (LRB):  MANIPULATION LEFT KNEE (Left)      Admit Date: 2019  Admit Diagnosis: Knee stiff, left [M25.662]  Arthrofibrosis of knee joint, unspecified laterality [M24.669]    LAB:    No results found for this or any previous visit (from the past 24 hour(s)). Vital Signs:    Patient Vitals for the past 8 hrs:   BP Temp Pulse Resp SpO2   19 0313 119/64 98.3 °F (36.8 °C) 78 16 99 %   19 0031 158/87 98.4 °F (36.9 °C) 81 16 99 %     Temp (24hrs), Av.9 °F (36.6 °C), Min:97.3 °F (36.3 °C), Max:98.4 °F (36.9 °C)    Body mass index is 26.77 kg/m². Pain Control:   Pain Assessment  Pain Scale 1: Numeric (0 - 10)  Pain Intensity 1: 5  Pain Onset 1: post op  Pain Location 1: Knee  Pain Orientation 1: Left  Pain Description 1: Aching  Pain Intervention(s) 1: Ambulation/Increased Activity, Cold pack, Exercise    Subjective: Doing well, No complaints, No SOB, No Chest Pain, No Nausea or Vomiting     Objective: Vital Signs are Stable, No Acute Distress, Alert and Oriented, Dressing is Dry,  Neurovascular exam is normal.       PT/OT:            Assistive Device: Walker (comment)           LLE PROM  L Knee Flexion: 104  L Knee Extension: -3    Weight Bearing Status: WBAT    Meds:  [unfilled]  [unfilled]  [unfilled]    Assessment:   Patient Active Problem List   Diagnosis Code    Osteoarthritis M19.90    Status post total knee replacement, left A05.453    Arthrofibrosis of knee joint, left M24.662    Arthrofibrosis of knee joint, unspecified laterality M24.669             Plan: Continue Physical Therapy, Monitor labs, D/C Home Today. Scheduled for outpatient PT at 11:00 this am with [facility].         Signed By: Ernesto Guardado NP

## 2019-08-28 ENCOUNTER — APPOINTMENT (OUTPATIENT)
Dept: PHYSICAL THERAPY | Age: 69
End: 2019-08-28
Payer: MEDICARE

## 2019-08-29 ENCOUNTER — HOSPITAL ENCOUNTER (OUTPATIENT)
Dept: PHYSICAL THERAPY | Age: 69
Discharge: HOME | End: 2019-08-29
Payer: MEDICARE

## 2019-08-29 PROCEDURE — 97140 MANUAL THERAPY 1/> REGIONS: CPT

## 2019-08-29 PROCEDURE — 97110 THERAPEUTIC EXERCISES: CPT

## 2019-08-30 ENCOUNTER — HOSPITAL ENCOUNTER (OUTPATIENT)
Dept: PHYSICAL THERAPY | Age: 69
Discharge: HOME | End: 2019-08-30
Payer: MEDICARE

## 2019-08-30 PROCEDURE — 97110 THERAPEUTIC EXERCISES: CPT

## 2019-08-30 PROCEDURE — 97140 MANUAL THERAPY 1/> REGIONS: CPT

## 2019-08-31 NOTE — PROGRESS NOTES
Blade Becerra  : 1950  [place] Geovany Serrano [place] / Plan[place] Analia Chery [place] / Product Type: Managed Care Medicare /  [address] [place] [address]  at [facility]  [address], [address]  [phone]   [phone]      OUTPATIENT PHYSICAL THERAPY: Daily Treatment Note 2019  Visit Count:  3  ICD-10: Treatment Diagnosis:   R26.2 Difficulty in walking, not elsewhere classified      M25.662 Stiffness of left knee, not elsewhere classified      M25.562 Pain in left knee     Pre-treatment Symptoms/Complaints: Still painful at L knee. Knee feels looser though still tight . Pain: Initial: Pain Intensity 1: 5  Post Session:  4/10   Medications Last Reviewed:  19  Updated Objective Findings:  Sitting knee flexion 118 after stretching  Passive knee ext -5. TREATMENT:   THERAPEUTIC EXERCISE: (25  minutes):  Exercises per grid below to improve mobility, strength and gait and pain . Required moderate verbal and manual cues to promote proper body alignment, promote proper body posture and promote proper body mechanics. Progressed resistance, range and repetitions as indicated. HS and HC stretching. Performed QS (quadriceps sets) and LAQ's (long arc quadriceps). Sitting hamstring curl at 20# x 4 sets of 10. Worked on 6 in step up x 20. Bike x 8 min at level 8. .     Manual Therapy ( 40 min   ): Patella and patella tendon mobs. Soft tissue work to [employer] and HS. PROM for knee flex and extn. Therapeutic Modalities (     ):        HEP: As above; handouts given to patient for all exercises. Treatment/Session Summary:   Pt presents with decreased ROM and strength of L knee and LE post L TKA on 19 and manipulation on 19. Pt was on CPM post manipulation until DC. Pt is referred for aggressive ROM of L knee. Pt with tightness in both full ext and flexion. Pt with increased tightness of knee flexion since manipulation. Working aggressively to increase ROM. Good increase in flexion during treatment. Improved gait. No longer using cane. · Response to Treatment:  Understood exercises. Increased motion . · Communication/Consultation:  None today  · Equipment provided today:  None today  · Recommendations/Intent for next treatment session: Next visit will focus on aggressive ROM and strengthening of L knee and LE. Gait training .   Treatment Plan of Care Effective Dates:  8/27/19 to 11/25/19  Total Treatment Billable Duration:  55 min   PT Patient Time In/Time Out  Time In: 1100  Time Out: Ruslan Perdomo, PT    Future Appointments   Date Time Provider Maira Correa   9/3/2019 11:30 AM Rick Smith, PT [facility]

## 2019-08-31 NOTE — PROGRESS NOTES
Leesa Conde  : 1950  [place] Graciel[place] / Plan[place] [facility]  [place] / Product Type: Managed Care Medicare /  [address] [place] [address]  at [facility]  [address], [address]  [phone]   [phone]      OUTPATIENT PHYSICAL THERAPY: Daily Treatment Note 2019  Visit Count:  2  ICD-10: Treatment Diagnosis:   R26.2 Difficulty in walking, not elsewhere classified      M25.662 Stiffness of left knee, not elsewhere classified      M25.562 Pain in left knee     Pre-treatment Symptoms/Complaints: Still painful at L knee. Knee feels looser though still tight . Pain: Initial: Pain Intensity 1: 5  Post Session:  4/10   Medications Last Reviewed:  19  Updated Objective Findings:  Sitting knee flexion 115 after stretching    TREATMENT:   THERAPEUTIC EXERCISE: (25  minutes):  Exercises per grid below to improve mobility, strength and gait and pain . Required moderate verbal and manual cues to promote proper body alignment, promote proper body posture and promote proper body mechanics. Progressed resistance, range and repetitions as indicated. HS and HC stretching. Performed QS (quadriceps sets) and LAQ's (long arc quadriceps). Sitting hamstring curl at 20# x 4 sets of 10. Worked on 6 in step up x 20. NuStep x 8 min at level 3. Manual Therapy ( 40 min   ): Patella and patella tendon mobs. Soft tissue work to [employer] and HS. PROM for knee flex and extn. Therapeutic Modalities (     ):        HEP: As above; handouts given to patient for all exercises. Treatment/Session Summary:   Pt presents with decreased ROM and strength of L knee and LE post L TKA on 19 and manipulation on 19. Pt was on CPM post manipulation until DC. Pt is referred for aggressive ROM of L knee. Pt with tightness in both full ext and flexion. Dr reports good flexion and ext during manipulation.   Good increase in flexion during treatment today. Improved gait. On straight cane. · Response to Treatment:  Understood exercises. Increased motion . · Communication/Consultation:  None today  · Equipment provided today:  None today  · Recommendations/Intent for next treatment session: Next visit will focus on aggressive ROM and strengthening of L knee and LE. Gait training .   Treatment Plan of Care Effective Dates:  8/27/19 to 11/25/19  Total Treatment Billable Duration:  55 min   PT Patient Time In/Time Out  Time In: 1100  Time Out: Ruslan Perdomo, PT    Future Appointments   Date Time Provider Maira Correa   9/3/2019 11:30 AM Nicho Jacob, PT [facility]

## 2019-09-03 ENCOUNTER — HOSPITAL ENCOUNTER (OUTPATIENT)
Dept: PHYSICAL THERAPY | Age: 69
Discharge: HOME | End: 2019-09-03
Payer: MEDICARE

## 2019-09-03 PROCEDURE — 97110 THERAPEUTIC EXERCISES: CPT

## 2019-09-03 PROCEDURE — 97140 MANUAL THERAPY 1/> REGIONS: CPT

## 2019-09-03 NOTE — PROGRESS NOTES
Lydia Barrios  : 1950  Payor[place] Stefany [place] / Plan[place] Colton[place]imy Blank [place] / Product Type: Managed Care Medicare /  [address] [place] [address]  at [address]  [address], [address]  [phone]   [phone]      OUTPATIENT PHYSICAL THERAPY: Daily Treatment Note 9/3/2019  Visit Count:  5  ICD-10: Treatment Diagnosis:   R26.2 Difficulty in walking, not elsewhere classified      M25.662 Stiffness of left knee, not elsewhere classified      M25.562 Pain in left knee     Pre-treatment Symptoms/Complaints: Still painful at L knee. Knee feels looser though still tight . Pain: Initial: Pain Intensity 1: 5  Post Session:  4/10   Medications Last Reviewed:  19  Updated Objective Findings:  Sitting knee flexion 118 after stretching  Passive knee ext -5. TREATMENT:   THERAPEUTIC EXERCISE: (25  minutes):  Exercises per grid below to improve mobility, strength and gait and pain . Required moderate verbal and manual cues to promote proper body alignment, promote proper body posture and promote proper body mechanics. Progressed resistance, range and repetitions as indicated. HS and HC stretching. Performed QS (quadriceps sets) and LAQ's (long arc quadriceps). Sitting hamstring curl at 20# x 4 sets of 10. Worked on 6 in step up x 20. NuStep x 8 min at level 3. Manual Therapy ( 30 min   ): Patella and patella tendon mobs. Soft tissue work to [employer] and HS. PROM for knee flex and extn. Therapeutic Modalities (     ):        HEP: As above; handouts given to patient for all exercises. Treatment/Session Summary:   Pt presents with decreased ROM and strength of L knee and LE post L TKA on 19 and manipulation on 19. Pt was on CPM post manipulation until DC. Pt is referred for aggressive ROM of L knee. Pt with tightness in both full ext and flexion. Dr reports good flexion and ext during manipulation.   Good increase in flexion during treatment today. Improved gait. On straight cane. · Response to Treatment:  Understood exercises. Increased motion . · Communication/Consultation:  None today  · Equipment provided today:  None today  · Recommendations/Intent for next treatment session: Next visit will focus on aggressive ROM and strengthening of L knee and LE. Gait training .   Treatment Plan of Care Effective Dates:  8/27/19 to 11/25/19  Total Treatment Billable Duration:  55 min   PT Patient Time In/Time Out  Time In: 9832  Time Out: [address] [facility],     Future Appointments   Date Time Provider Maira Correa   9/4/2019  9:00 AM Mitra Cody, PT [facility]

## 2019-09-04 ENCOUNTER — HOSPITAL ENCOUNTER (OUTPATIENT)
Dept: PHYSICAL THERAPY | Age: 69
Discharge: HOME | End: 2019-09-04
Payer: MEDICARE

## 2019-09-04 PROCEDURE — 97140 MANUAL THERAPY 1/> REGIONS: CPT

## 2019-09-04 PROCEDURE — 97110 THERAPEUTIC EXERCISES: CPT

## 2019-09-04 NOTE — PROGRESS NOTES
Juanito Minh  : 1950  [place] Suresh Rodgers [place] / Plan[place] Sheila H[place] / Product Type: Managed Care Medicare /  [address] [place] [address]  at [facility]  [address], [address]  [phone]   [phone]      OUTPATIENT PHYSICAL THERAPY: Daily Treatment Note 2019  Visit Count:  6  ICD-10: Treatment Diagnosis:   R26.2 Difficulty in walking, not elsewhere classified      M25.662 Stiffness of left knee, not elsewhere classified      M25.562 Pain in left knee     Pre-treatment Symptoms/Complaints: I have been trying to keep the knee moving a lot. I think it is a little looser though still sore. .   Pain: Initial: Pain Intensity 1: 4  Post Session:  4/10   Medications Last Reviewed:  19  Updated Objective Findings:  Sitting knee flexion 122 after stretching  Passive knee ext -3. TREATMENT:   THERAPEUTIC EXERCISE: (25  minutes):  Exercises per grid below to improve mobility, strength and gait and pain . Required moderate verbal and manual cues to promote proper body alignment, promote proper body posture and promote proper body mechanics. Progressed resistance, range and repetitions as indicated. HS and HC stretching. Performed QS (quadriceps sets) and LAQ's (long arc quadriceps). Sitting hamstring curl at 20# x 4 sets of 10. Worked on 6 in step up x 20. NuStep x 8 min at level 3. Manual Therapy ( 30 min   ): Patella and patella tendon mobs. Soft tissue work to [employer] and HS. PROM for knee flex and extn. Therapeutic Modalities (     ):        HEP: As above; handouts given to patient for all exercises. Treatment/Session Summary:   Pt presents with decreased ROM and strength of L knee and LE post L TKA on 19 and manipulation on 19. Pt was on CPM post manipulation until DC. Pt is referred for aggressive ROM of L knee. Pt with tightness in both full ext and flexion.    reports good flexion and ext during manipulation. Good increase in flexion during treatment today. Improved gait. On straight cane. · Response to Treatment:  Understood exercises. Increased motion . · Communication/Consultation:  None today  · Equipment provided today:  None today  · Recommendations/Intent for next treatment session: Next visit will focus on aggressive ROM and strengthening of L knee and LE. Gait training .   Treatment Plan of Care Effective Dates:  8/27/19 to 11/25/19  Total Treatment Billable Duration:  55 min   PT Patient Time In/Time Out  Time In: 0900  Time Out: [address], PT    Future Appointments   Date Time Provider Maira Correa   9/6/2019  2:30 PM Abbie Coughlin, PT [facility]

## 2019-09-06 ENCOUNTER — HOSPITAL ENCOUNTER (OUTPATIENT)
Dept: PHYSICAL THERAPY | Age: 69
Discharge: HOME | End: 2019-09-06
Payer: MEDICARE

## 2019-09-06 PROCEDURE — 97110 THERAPEUTIC EXERCISES: CPT

## 2019-09-06 PROCEDURE — 97140 MANUAL THERAPY 1/> REGIONS: CPT

## 2019-09-06 NOTE — PROGRESS NOTES
Leigh Schroeder  : 1950  [place] Cornell Basilio [place] / [place] Pablo Elda [place] / Product Type: Managed Care Medicare /  [address] [place] [address]  at [facility]  [address], [address]  [phone]   [phone]      OUTPATIENT PHYSICAL THERAPY: Daily Treatment Note 2019  Visit Count:  7  ICD-10: Treatment Diagnosis:   R26.2 Difficulty in walking, not elsewhere classified      M25.662 Stiffness of left knee, not elsewhere classified      M25.562 Pain in left knee     Pre-treatment Symptoms/Complaints: I have been trying to keep the knee moving a lot. I think it is a little looser though still sore. .   Pain: Initial: Pain Intensity 1: 4  Post Session:  4/10   Medications Last Reviewed:  19  Updated Objective Findings:  Sitting knee flexion 122 after stretching  Passive knee ext -2. TREATMENT:   THERAPEUTIC EXERCISE: (25  minutes):  Exercises per grid below to improve mobility, strength and gait and pain . Required moderate verbal and manual cues to promote proper body alignment, promote proper body posture and promote proper body mechanics. Progressed resistance, range and repetitions as indicated. HS and HC stretching. Performed QS (quadriceps sets) and LAQ's (long arc quadriceps). Sitting hamstring curl at 20# x 4 sets of 10. Worked on 6 in step up x 20. NuStep x 8 min at level 3. Manual Therapy ( 30 min   ): Patella and patella tendon mobs. Soft tissue work to [employer] and HS. PROM for knee flex and extn. Therapeutic Modalities (     ):        HEP: As above; handouts given to patient for all exercises. Treatment/Session Summary:   Pt presents with decreased ROM and strength of L knee and LE post L TKA on 19 and manipulation on 19. Pt was on CPM post manipulation until DC. Pt is referred for aggressive ROM of L knee. Pt with tightness in both full ext and flexion.    reports good flexion and ext during manipulation. Good increase in flexion during treatment today. Improved gait. On straight cane. · Response to Treatment:  Understood exercises. Increased motion . · Communication/Consultation:  None today  · Equipment provided today:  None today  · Recommendations/Intent for next treatment session: Next visit will focus on aggressive ROM and strengthening of L knee and LE. Gait training .   Treatment Plan of Care Effective Dates:  8/27/19 to 11/25/19  Total Treatment Billable Duration:  55 min   PT Patient Time In/Time Out  Time In: 0200  Time Out: 0300  Melinda Olmos PT    Future Appointments   Date Time Provider Maira Correa   9/9/2019  2:00 PM eMrna Bee, PT [facility]

## 2019-09-09 ENCOUNTER — HOSPITAL ENCOUNTER (OUTPATIENT)
Dept: PHYSICAL THERAPY | Age: 69
Discharge: HOME | End: 2019-09-09
Payer: MEDICARE

## 2019-09-09 PROCEDURE — 97140 MANUAL THERAPY 1/> REGIONS: CPT

## 2019-09-09 PROCEDURE — 97110 THERAPEUTIC EXERCISES: CPT

## 2019-09-09 NOTE — PROGRESS NOTES
Wing Leoshajajameel  : 1950  [place] Yohanamary Choi [place] / [place] Sheryl Poseyggzaire [place] / Product Type: Managed Care Medicare /  [address] [place] [address]  at [facility]  [address], [address]  [phone]   [phone]      OUTPATIENT PHYSICAL THERAPY: Daily Treatment Note 2019  Visit Count:  8  ICD-10: Treatment Diagnosis:   R26.2 Difficulty in walking, not elsewhere classified      M25.662 Stiffness of left knee, not elsewhere classified      M25.562 Pain in left knee     Pre-treatment Symptoms/Complaints: I have been trying to keep the knee moving a lot. I think it is a little looser though still sore. .   Pain: Initial: Pain Intensity 1: 4  Post Session:  4/10   Medications Last Reviewed:  2019    Updated Objective Findings:  Sitting knee flexion 122 after stretching  Passive knee ext -2 in supine . TREATMENT:   THERAPEUTIC EXERCISE: (25  minutes):  Exercises per grid below to improve mobility, strength and gait and pain . Required moderate verbal and manual cues to promote proper body alignment, promote proper body posture and promote proper body mechanics. Progressed resistance, range and repetitions as indicated. HS and HC stretching. Performed QS (quadriceps sets) and LAQ's (long arc quadriceps). Sitting hamstring curl at 20# x 4 sets of 10. Worked on 6 in step up x 20. NuStep x 8 min at level 3. Manual Therapy ( 30 min   ): Patella and patella tendon mobs. Soft tissue work to [employer] and HS. PROM for knee flex and extn. Therapeutic Modalities (     ):        HEP: As above; handouts given to patient for all exercises. Treatment/Session Summary:   Pt presents with decreased ROM and strength of L knee and LE post L TKA on 19 and manipulation on 19. Pt was on CPM post manipulation until DC. Pt is referred for aggressive ROM of L knee. Pt with tightness in both full ext and flexion.    reports good flexion and ext during manipulation. Good increase in flexion during treatment today. Improved gait. On straight cane. · Response to Treatment:  Understood exercises. Increased motion . · Communication/Consultation:  None today  · Equipment provided today:  None today  · Recommendations/Intent for next treatment session: Next visit will focus on aggressive ROM and strengthening of L knee and LE. Gait training . Treatment Plan of Care Effective Dates:  8/27/19 to 11/25/19  Total Treatment Billable Duration:  55 min   PT Patient Time In/Time Out  Time In: 0200  Time Out: 0300  Tigist Jeffries, PT    No future appointments.

## 2019-09-16 NOTE — THERAPY DISCHARGE
Whitney Finley  : 1950  [place]  Secondary:  Therapy Center at [facility]  [address] , [address], [address]  [phone]   [phone]          OUTPATIENT PHYSICAL THERAPY:Discontinuation Summary 2019   ICD-10: Treatment Diagnosis: Difficulty in walking, not elsewhere classified (R26.2); Pain in left knee (Q65.349)  Precautions/Allergies:   Patient has no known allergies. TREATMENT PLAN:  Effective Dates: 2019 TO 2019 (60 days). Frequency/Duration: 2 times a week for 60 Day(s) MEDICAL/REFERRING DIAGNOSIS:  Difficulty in walking, not elsewhere classified [R26.2]  Pain in left knee [M25.562]   DATE OF ONSET: s/p L TKA 19  REFERRING PHYSICIAN: Milton Alarcon NP MD Orders: evaluate and treat  Return MD Appointment: 4 weeks     ASSESSMENT:  Mr. Ana Lomas initiated PT on 19 and attended 17 sessions at [facility]. He was making slow progress in his motion, and underwent manipulation for the L knee following TKA. His therapy plan of care was transferred to a different clinic. He will be discharged from PT under this plan of care, at this time. GOALS: (Goals have been discussed and agreed upon with patient.)     SHORT-TERM FUNCTIONAL GOALS: Time Frame: 3 weeks  1. Patient will be compliant with HEP focused on lower extremity strengthening and ROM. GOAL MET 2019  2. Patient will rate L LE pain no greater than 4/10 for improved tolerance to daily and work duties. GOAL MET 2019    DISCHARGE GOALS: Time Frame: 6-8 weeks   All long term goals unable to assess 2019  1. Patient will be independent with comprehensive HEP focused on continued performance of lower extremity strengthening and ROM activities. 2.  Patient will rate L LE pain no greater than 2/10 and which does not significantly interfere with daily or work duties for return to previous level of function.    3.  Patient will demonstrate near symmetrical bilateral LE AROM for optimum functional mobility with daily and work duties. 4.  Patient will demonstrate near symmetrical bilateral LE strength grades in the above tested planes for optimum performance and safety with daily and work duties. Rehabilitation Potential For Stated Goals: Good  Regarding Chris Freitas's therapy, I certify that the treatment plan above will be carried out by a therapist or under their direction.   Thank you for this referral,  Tae Arrieta, PT, DPT

## 2019-09-18 ENCOUNTER — HOSPITAL ENCOUNTER (OUTPATIENT)
Dept: PHYSICAL THERAPY | Age: 69
Discharge: HOME | End: 2019-09-18
Payer: MEDICARE

## 2019-09-18 PROCEDURE — 97140 MANUAL THERAPY 1/> REGIONS: CPT

## 2019-09-18 PROCEDURE — 97110 THERAPEUTIC EXERCISES: CPT

## 2019-09-18 NOTE — PROGRESS NOTES
Tricia Dawson  : 1950  Payor: [facility] [place] / Plan[place] Domonique Mas [place] / Product Type: Managed Care Medicare /  [address] [place] [address]  at [facility]  [address], [address]  [phone]   [phone]      OUTPATIENT PHYSICAL THERAPY: Daily Treatment Note 2019  Visit Count:  10  ICD-10: Treatment Diagnosis:   R26.2 Difficulty in walking, not elsewhere classified      M25.662 Stiffness of left knee, not elsewhere classified      M25.562 Pain in left knee     Pre-treatment Symptoms/Complaints: Still some soreness when I am on it a lot. It is looser but still stiff at times.  said to continue. .   Pain: Initial: Pain Intensity 1: 3  Post Session:  2/10   Medications Last Reviewed:  2019    Updated Objective Findings:  Sitting knee flexion 125 after stretching  Passive knee ext to neutral after stretching. .    TREATMENT:   THERAPEUTIC EXERCISE: (25  minutes):  Exercises per grid below to improve mobility, strength and gait and pain . Required moderate verbal and manual cues to promote proper body alignment, promote proper body posture and promote proper body mechanics. Progressed resistance, range and repetitions as indicated. HS and HC stretching. Performed QS (quadriceps sets) and LAQ's (long arc quadriceps). Sitting hamstring curl at 20# x 4 sets of 10. Worked on 6 in step up x 20. NuStep x 8 min at level 3. Manual Therapy ( 30 min   ): Patella and patella tendon mobs. Soft tissue work to [employer] and HS. PROM for knee flex and extn. Therapeutic Modalities (     ):        HEP: As above; handouts given to patient for all exercises. Treatment/Session Summary:   Pt presents with decreased ROM and strength of L knee and LE post L TKA on 19 and manipulation on 19. [place] Pt is referred for aggressive ROM of L knee. Pt with tightness in both full ext and flexion.    reports good flexion and ext during manipulation. Good increase in flexion during treatment today. Improved gait. On straight cane. · Response to Treatment:  Understood exercises. Increased motion . · Communication/Consultation:  None today  · Equipment provided today:  None today  · Recommendations/Intent for next treatment session: Next visit will focus on aggressive ROM and strengthening of L knee and LE. Gait training .   Treatment Plan of Care Effective Dates:  8/27/19 to 11/25/19  Total Treatment Billable Duration:  55 min   PT Patient Time In/Time Out  Time In: 1100  Time Out: Ruslan Perdomo, PT    Future Appointments   Date Time Provider Maira Correa   9/20/2019 10:30 AM ABRAHAM Norton   9/23/2019  9:00 AM [facility]

## 2019-09-20 ENCOUNTER — HOSPITAL ENCOUNTER (OUTPATIENT)
Dept: PHYSICAL THERAPY | Age: 69
Discharge: HOME | End: 2019-09-20
Payer: MEDICARE

## 2019-09-20 PROCEDURE — 97110 THERAPEUTIC EXERCISES: CPT

## 2019-09-20 PROCEDURE — 97140 MANUAL THERAPY 1/> REGIONS: CPT

## 2019-09-20 NOTE — PROGRESS NOTES
Kadie Rule  : 1950  Payor: [facility] [place] / Plan: [facility] MEDICARE HMO/PPO / Product Type: Managed Care Medicare /  [address] [place] [address]  at [facility]  [address], [address], [address]  [phone]   [phone]      OUTPATIENT PHYSICAL THERAPY: Daily Treatment Note 2019  Visit Count:  11  ICD-10: Treatment Diagnosis:   R26.2 Difficulty in walking, not elsewhere classified      M25.662 Stiffness of left knee, not elsewhere classified      M25.562 Pain in left knee     Pre-treatment Symptoms/Complaints: Still some soreness when I am on it a lot. It is looser but still stiff at times. Dr said to continue. .   Pain: Initial: Pain Intensity 1: 3  Post Session:  2/10   Medications Last Reviewed:  2019    Updated Objective Findings:  Sitting knee flexion 127 after stretching  Passive knee ext to neutral after stretching. .    TREATMENT:   THERAPEUTIC EXERCISE: (25  minutes):  Exercises per grid below to improve mobility, strength and gait and pain . Required moderate verbal and manual cues to promote proper body alignment, promote proper body posture and promote proper body mechanics. Progressed resistance, range and repetitions as indicated. HS and HC stretching. Performed QS (quadriceps sets) and LAQ's (long arc quadriceps). Sitting hamstring curl at 20# x 4 sets of 10. Worked on 6 in step up x 20. NuStep x 8 min at level 3. Manual Therapy ( 30 min   ): Patella and patella tendon mobs. Soft tissue work to [employer] and HS. PROM for knee flex and extn. Therapeutic Modalities (     ):        HEP: As above; handouts given to patient for all exercises. Treatment/Session Summary:   Pt presents with decreased ROM and strength of L knee and LE post L TKA on 19 and manipulation on 19. Wong Kamara Pt is referred for aggressive ROM of L knee. Pt with tightness in both full ext and flexion.    reports good flexion and ext during manipulation. Good increase in flexion during treatment today. Improved gait. On straight cane. · Response to Treatment:  Understood exercises. Increased motion . · Communication/Consultation:  None today  · Equipment provided today:  None today  · Recommendations/Intent for next treatment session: Next visit will focus on aggressive ROM and strengthening of L knee and LE. Gait training .   Treatment Plan of Care Effective Dates:  8/27/19 to 11/25/19  Total Treatment Billable Duration:  55 min   PT Patient Time In/Time Out  Time In: 1100  Time Out: Ruslan Perdomo, PT    Future Appointments   Date Time Provider Maira Correa   9/23/2019  7:00 PM Nicho Jacob, PT [facility]

## 2019-09-23 ENCOUNTER — HOSPITAL ENCOUNTER (OUTPATIENT)
Dept: PHYSICAL THERAPY | Age: 69
Discharge: HOME | End: 2019-09-23
Payer: MEDICARE

## 2019-09-24 ENCOUNTER — HOSPITAL ENCOUNTER (OUTPATIENT)
Dept: PHYSICAL THERAPY | Age: 69
Discharge: HOME | End: 2019-09-24
Payer: MEDICARE

## 2019-09-24 PROCEDURE — 97110 THERAPEUTIC EXERCISES: CPT

## 2019-09-24 PROCEDURE — 97140 MANUAL THERAPY 1/> REGIONS: CPT

## 2019-09-24 NOTE — PROGRESS NOTES
Hailey Joaquin  : 1950  Payor: [facility] [place] Ryan Adan [place] / Product Type: Managed Care Medicare /  [facility] [place]  at [facility]  [address], [address]  [phone]   [phone]      OUTPATIENT PHYSICAL THERAPY: Daily Treatment Note 2019  Visit Count:  12  ICD-10: Treatment Diagnosis:   R26.2 Difficulty in walking, not elsewhere classified      M25.662 Stiffness of left knee, not elsewhere classified      M25.562 Pain in left knee     Pre-treatment Symptoms/Complaints: Still some soreness when I am on it a lot. I walked this weekend for a mile one day then 2 miles. Had some swelling but minimal pain. .   Pain: Initial: Pain Intensity 1: 3  Post Session:  2/10   Medications Last Reviewed:  2019    Updated Objective Findings:  Sitting knee flexion 127 after stretching  Passive knee ext to neutral after stretching. .    TREATMENT:   THERAPEUTIC EXERCISE: (25  minutes):  Exercises per grid below to improve mobility, strength and gait and pain . Required moderate verbal and manual cues to promote proper body alignment, promote proper body posture and promote proper body mechanics. Progressed resistance, range and repetitions as indicated. HS and HC stretching. Performed QS (quadriceps sets) and LAQ's (long arc quadriceps). Sitting hamstring curl at 20# x 4 sets of 10. Worked on 6 in step up x 20. NuStep x 8 min at level 3. Manual Therapy ( 30 min   ): Patella and patella tendon mobs. Soft tissue work to [employer] and HS. PROM for knee flex and extn. Therapeutic Modalities (     ):        HEP: As above; handouts given to patient for all exercises. Treatment/Session Summary:   Pt presents with decreased ROM and strength of L knee and LE post L TKA on 19 and manipulation on 19. Fadia Nevarez Pt is referred for aggressive ROM of L knee. Pt with tightness in both full ext and flexion. Dr reports good flexion and ext during manipulation. Good increase in flexion during treatment today. Improved gait. On straight cane. · Response to Treatment:  Understood exercises. Increased motion . · Communication/Consultation:  None today  · Equipment provided today:  None today  · Recommendations/Intent for next treatment session: Next visit will focus on aggressive ROM and strengthening of L knee and LE. Gait training .   Treatment Plan of Care Effective Dates:  8/27/19 to 11/25/19  Total Treatment Billable Duration:  55 min   PT Patient Time In/Time Out  Time In: 1000  Time Out: [address], PT    Future Appointments   Date Time Provider Maira Correa   9/26/2019  3:30 PM Shruthi Fuentes, PT [facility]

## 2019-09-24 NOTE — PROGRESS NOTES
Josse [place]  : 1950  [place]  Secondary: [employer] [facility] at [facility]  [address] [place] [address], [facility] [address]  [phone]   [phone]        OUTPATIENT DAILY NOTE    NAME/AGE/GENDER: Josse [place] is a 81 y.o. male. DATE: 2019    Mr. Mayte Correia for today's appointment due to awaiting insurance approval.    Jana Hart, PT

## 2019-09-26 ENCOUNTER — HOSPITAL ENCOUNTER (OUTPATIENT)
Dept: PHYSICAL THERAPY | Age: 69
Discharge: HOME | End: 2019-09-26
Payer: MEDICARE

## 2019-09-26 PROCEDURE — 97140 MANUAL THERAPY 1/> REGIONS: CPT

## 2019-09-26 PROCEDURE — 97110 THERAPEUTIC EXERCISES: CPT

## 2019-09-26 NOTE — PROGRESS NOTES
Osvaldo Combs  : 1950  [place]ola [place] / Plan[place] Eleazar [place] / Product Type: Managed Care Medicare /  [facility] [place]  at [facility]  [address], [address]  [phone]   [phone]      OUTPATIENT PHYSICAL THERAPY: Daily Treatment Note 2019  Visit Count:  13  ICD-10: Treatment Diagnosis:   R26.2 Difficulty in walking, not elsewhere classified      M25.662 Stiffness of left knee, not elsewhere classified      M25.562 Pain in left knee     Pre-treatment Symptoms/Complaints: Still some soreness when I am on it a lot. I walked this weekend for a mile one day then 2 miles. Had some swelling but minimal pain. .   Pain: Initial: Pain Intensity 1: 2  Post Session:  2/10   Medications Last Reviewed:  2019    Updated Objective Findings:  Sitting knee flexion 128 after stretching  Passive knee ext to neutral after stretching. .    TREATMENT:   THERAPEUTIC EXERCISE: (25  minutes):  Exercises per grid below to improve mobility, strength and gait and pain . Required moderate verbal and manual cues to promote proper body alignment, promote proper body posture and promote proper body mechanics. Progressed resistance, range and repetitions as indicated. HS and HC stretching. Performed QS (quadriceps sets) and LAQ's (long arc quadriceps). Sitting hamstring curl at 20# x 4 sets of 10. Worked on 6 in step up x 20. NuStep x 8 min at level 3. Manual Therapy ( 30 min   ): Patella and patella tendon mobs. Soft tissue work to [employer] and HS. PROM for knee flex and extn. Therapeutic Modalities (     ):        HEP: As above; handouts given to patient for all exercises. Treatment/Session Summary:   Pt presents with decreased ROM and strength of L knee and LE post L TKA on 19 and manipulation on 19. Carie Sy Pt is referred for aggressive ROM of L knee. Pt with tightness in both full ext and flexion. Dr reports good flexion and ext during manipulation. Good increase in flexion during treatment today. Improved gait. On straight cane. · Response to Treatment:  Understood exercises. Increased motion . · Communication/Consultation:  None today  · Equipment provided today:  None today  · Recommendations/Intent for next treatment session: Next visit will focus on aggressive ROM and strengthening of L knee and LE. Gait training . Treatment Plan of Care Effective Dates:  8/27/19 to 11/25/19  Total Treatment Billable Duration:  55 min   PT Patient Time In/Time Out  Time In: 0300  Time Out: 0355  Ghanshyam Coughlin PT    No future appointments.

## 2019-10-01 ENCOUNTER — HOSPITAL ENCOUNTER (OUTPATIENT)
Dept: PHYSICAL THERAPY | Age: 69
Discharge: HOME | End: 2019-10-01
Payer: MEDICARE

## 2019-10-01 PROCEDURE — 97110 THERAPEUTIC EXERCISES: CPT

## 2019-10-01 PROCEDURE — 97140 MANUAL THERAPY 1/> REGIONS: CPT

## 2019-10-01 NOTE — PROGRESS NOTES
France Palma  : 1950  [place] Thierry Mera [place] / Plan[place] Monserrat Werner [place] / Product Type: Managed Care Medicare /  [address] [place] [address]  at [facility]  [address], [address]  [phone]   [phone]      OUTPATIENT PHYSICAL THERAPY: Daily Treatment Note 10/1/2019  Visit Count:  14  ICD-10: Treatment Diagnosis:   R26.2 Difficulty in walking, not elsewhere classified      M25.662 Stiffness of left knee, not elsewhere classified      M25.562 Pain in left knee     Pre-treatment Symptoms/Complaints:   I rode my bike this weekend and did well. Pain: Initial: Pain Intensity 1: 2  Post Session:  2/10   Medications Last Reviewed:  10/1/2019    Updated Objective Findings:  Sitting knee flexion 128 after stretching  Passive knee ext to neutral after stretching. .    TREATMENT:   THERAPEUTIC EXERCISE: (25  minutes):  Exercises per grid below to improve mobility, strength and gait and pain . Required moderate verbal and manual cues to promote proper body alignment, promote proper body posture and promote proper body mechanics. Progressed resistance, range and repetitions as indicated. HS and HC stretching. Performed QS (quadriceps sets) and LAQ's (long arc quadriceps). Sitting hamstring curl at 30# x 4 sets of 10. Worked on 8 in step up and over x 20. Bike x 10 min at level 3. Manual Therapy ( 30 min   ): Patella and patella tendon mobs. Soft tissue work to [employer] and HS. PROM for knee flex and extn. Therapeutic Modalities (     ):        HEP: As above; handouts given to patient for all exercises. Treatment/Session Summary:   Pt presents with decreased ROM and strength of L knee and LE post L TKA on 19 and manipulation on 19. Cayden Linn Pt is referred for aggressive ROM of L knee. Pt initially with tightness in both full ext and flexion. Good increase in knee flexion and ext with increased ease of motion.   riding bike now with no increase in pain. .  Good increase in flexion. .  Improved gait. · Response to Treatment:  Understood exercises. Increased motion . · Communication/Consultation:  None today  · Equipment provided today:  None today  · Recommendations/Intent for next treatment session: Next visit will focus on aggressive ROM and strengthening of L knee and LE. Gait training . Treatment Plan of Care Effective Dates:  8/27/19 to 11/25/19  Total Treatment Billable Duration:  55 min   PT Patient Time In/Time Out  Time In: 1030  Time Out: 1130  Linda Logan, PT    No future appointments.

## 2019-10-08 ENCOUNTER — HOSPITAL ENCOUNTER (OUTPATIENT)
Dept: PHYSICAL THERAPY | Age: 69
Discharge: HOME | End: 2019-10-08
Payer: MEDICARE

## 2019-10-08 NOTE — PROGRESS NOTES
Jessica Robertson  : 1950  [place]  Secondary: [facility] at [facility]  [address] [place] [address], [facility] [address]  [phone]   [phone]        OUTPATIENT DAILY NOTE    NAME/AGE/GENDER: Jessica Robertson is a 81 y.o. male. DATE: 10/8/2019    Mr. Tim Oseguera for today's appointment due to family emergency.     Toy Orourke, PT

## 2019-10-10 ENCOUNTER — HOSPITAL ENCOUNTER (OUTPATIENT)
Dept: PHYSICAL THERAPY | Age: 69
Discharge: HOME | End: 2019-10-10
Payer: MEDICARE

## 2019-10-10 PROCEDURE — 97140 MANUAL THERAPY 1/> REGIONS: CPT

## 2019-10-10 PROCEDURE — 97110 THERAPEUTIC EXERCISES: CPT

## 2019-10-10 NOTE — PROGRESS NOTES
Elizabeth Mishra  : 1950  [place] Nakul [place] / [place] Carlos E[place] / Product Type: Managed Care Medicare /  [address] [place] [address]  at [facility]  [address], [address]  [phone]   [phone]      OUTPATIENT PHYSICAL THERAPY: Daily Treatment Note 10/10/2019  Visit Count:  15  ICD-10: Treatment Diagnosis:   R26.2 Difficulty in walking, not elsewhere classified      M25.662 Stiffness of left knee, not elsewhere classified      M25.562 Pain in left knee     Pre-treatment Symptoms/Complaints: Patient reports knee is feeling a little better not as stiff today. Pain: Initial: Pain Intensity 1: 2  Post Session:  2/10   Medications Last Reviewed:  10/10/2019    Updated Objective Findings: None Today    TREATMENT:   THERAPEUTIC EXERCISE: (30  minutes):  Exercises per grid below to improve mobility, strength and gait and pain . Required moderate verbal and manual cues to promote proper body alignment, promote proper body posture and promote proper body mechanics. Progressed resistance, range and repetitions as indicated. HS and HC stretching. Performed QS (quadriceps sets) and LAQ's (long arc quadriceps). Sitting hamstring curl at 30# x 4 sets of 10. Worked on 8 in step up and over x 20. Bike x 10 min at level 3. Nu step x 10 level 4    Manual Therapy ( 30 min   ): Patella and patella tendon mobs. Soft tissue work to [employer] and HS. PROM for knee flex and extn. Therapeutic Modalities (     ):        HEP: As directed. Treatment/Session Summary:   Pt presents with decreased ROM and strength of L knee and LE post L TKA on 19 and manipulation on 19. Piero Plaster Pt is referred for aggressive ROM of L knee. Pt initially with tightness in both full ext and flexion. Good improvement in ROM and strength. Improved gait. Patient indicates he has started riding more and longer.  Instructed patient to continue home exercises as directed. · Response to Treatment:  Understood exercises. Increased motion . · Communication/Consultation:  None today  · Equipment provided today:  None today  · Recommendations/Intent for next treatment session: Next visit will focus on aggressive ROM and strengthening of L knee and LE. Gait training . Treatment Plan of Care Effective Dates:  8/27/19 to 11/25/19  Total Treatment Billable Duration:  60 min   PT Patient Time In/Time Out  Time In: 0900  Time Out: [address], PTA    No future appointments.

## 2019-10-14 ENCOUNTER — HOSPITAL ENCOUNTER (OUTPATIENT)
Dept: PHYSICAL THERAPY | Age: 69
Discharge: HOME | End: 2019-10-14
Payer: MEDICARE

## 2019-10-14 PROCEDURE — 97140 MANUAL THERAPY 1/> REGIONS: CPT

## 2019-10-14 PROCEDURE — 97110 THERAPEUTIC EXERCISES: CPT

## 2019-10-14 NOTE — PROGRESS NOTES
Wale Tatum  : 1950  [place]avonne [place] Plan[place] Mylene Base [place] / Product Type: Managed Care Medicare /  [address] [place] [address]  at [facility]  [address], [address]  [phone]   [phone]      OUTPATIENT PHYSICAL THERAPY: Daily Treatment Note 10/14/2019  Visit Count:  16  ICD-10: Treatment Diagnosis:   R26.2 Difficulty in walking, not elsewhere classified      M25.662 Stiffness of left knee, not elsewhere classified      M25.562 Pain in left knee     Pre-treatment Symptoms/Complaints: Patient reports knee is feeling better. Has been riding his bicycle for 5 miles this weekend with out discomfort. Pain: Initial: Pain Intensity 1: 2  Post Session:  2/10   Medications Last Reviewed:  10/14/2019    Updated Objective Findings: Sitting knee flexion 130. Full passive ext. TREATMENT:   THERAPEUTIC EXERCISE: (30  minutes):  Exercises per grid below to improve mobility, strength and gait and pain . Required moderate verbal and manual cues to promote proper body alignment, promote proper body posture and promote proper body mechanics. Progressed resistance, range and repetitions as indicated. HS and HC stretching. Performed QS (quadriceps sets) and LAQ's (long arc quadriceps). Sitting hamstring curl at 30# x 4 sets of 10. Worked on 8 in step up and over x 20. Bike x 10 min at level 3. Nu step x 10 level 4    Manual Therapy ( 30 min   ): Patella and patella tendon mobs. Soft tissue work to [employer] and HS. PROM for knee flex and extn. Therapeutic Modalities (     ):        HEP: As directed. Treatment/Session Summary:   Pt presents with decreased ROM and strength of L knee and LE post L TKA on 19 and manipulation on 19. Karina Sanchez Pt was referred for aggressive ROM of L knee. Pt initially with tightness in both full ext and flexion. Excellent improvement in ROM and strength. Much improved gait.  Patient now able to ride his bike . Instructed patient to continue home exercises as directed. Probable DC next week. · Response to Treatment:  Understood exercises. Increased motion . · Communication/Consultation:  None today  · Equipment provided today:  None today  · Recommendations/Intent for next treatment session: Next visit will focus on aggressive ROM and strengthening of L knee and LE. Gait training . Treatment Plan of Care Effective Dates:  8/27/19 to 11/25/19  Total Treatment Billable Duration:  60 min   PT Patient Time In/Time Out  Time In: 0200  Time Out: 0300  Yessenia Otero, PT, PTA    No future appointments.

## 2019-10-22 ENCOUNTER — APPOINTMENT (OUTPATIENT)
Dept: PHYSICAL THERAPY | Age: 69
End: 2019-10-22
Payer: MEDICARE

## 2020-03-05 NOTE — PROGRESS NOTES
Nathalia Sloan  : 1950  [place] / [place] Darío Woods [place] / Product Type: Managed Care Medicare /  [address] [place] [address]  at [facility]  [address], [address]  [phone]   [phone]      OUTPATIENT PHYSICAL THERAPY: Discharge Note  10/14/2019  Visit Count:  16  ICD-10: Treatment Diagnosis:   R26.2 Difficulty in walking, not elsewhere classified      M25.662 Stiffness of left knee, not elsewhere classified      M25.562 Pain in left knee     Pre-treatment Symptoms/Complaints: Patient reports knee is feeling better. Has been riding his bicycle for 5 miles this weekend with out discomfort. 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 Pain: Initial: Pain Intensity 1: 2  Post Session:  2/10   Medications Last Reviewed:  10/14/19  Updated Objective Findings: Sitting knee flexion 130. Full passive ext. TREATMENT:   THERAPEUTIC EXERCISE: (30  minutes):  Exercises per grid below to improve mobility, strength and gait and pain . Required moderate verbal and manual cues to promote proper body alignment, promote proper body posture and promote proper body mechanics. Progressed resistance, range and repetitions as indicated. HS and HC stretching. Performed QS (quadriceps sets) and LAQ's (long arc quadriceps). Sitting hamstring curl at 30# x 4 sets of 10. Worked on 8 in step up and over x 20. Bike x 10 min at level 3. Nu step x 10 level 4  Reviewed HEP  Manual Therapy ( 30 min   ): Patella and patella tendon mobs. Soft tissue work to [employer] and HS. PROM for knee flex and extn. Therapeutic Modalities (     ):        HEP: As directed. Treatment/Session Summary:   Pt presented with decreased ROM and strength of L knee and LE post L TKA on 19 and manipulation on 19. Kyle Siddiqui Pt was referred for aggressive ROM of L knee. Pt initially with tightness in both full ext and flexion.  Excellent improvement in ROM and strength. Much improved gait. Patient now able to ride his bike . Instructed patient to continue home exercises as directed. DC to TON Boyd · Response to Treatment:  Understood exercises. Increased motion . · Communication/Consultation:  None today  · Equipment provided today:  None today  Recommendations/Intent for next treatment session: ZULEYMA Peña 50 of Care Effective Dates:  8/27/19 to 11/25/19  Total Treatment Billable Duration:  60 min   PT Patient Time In/Time Out  Time In: 0200  Time Out: 0300  Linda Pandey, PT,     No future appointments.

## 2022-03-18 PROBLEM — Z96.652 STATUS POST TOTAL KNEE REPLACEMENT, LEFT: Status: ACTIVE | Noted: 2019-06-12

## 2022-03-19 PROBLEM — M24.662 ARTHROFIBROSIS OF KNEE JOINT, LEFT: Status: ACTIVE | Noted: 2019-08-26

## 2022-03-19 PROBLEM — M24.669 ARTHROFIBROSIS OF KNEE JOINT, UNSPECIFIED LATERALITY: Status: ACTIVE | Noted: 2019-08-26

## 2022-03-19 PROBLEM — M19.90 OSTEOARTHRITIS: Status: ACTIVE | Noted: 2019-06-12

## 2022-05-10 PROBLEM — I10 HYPERTENSION: Status: ACTIVE | Noted: 2022-05-10

## 2022-05-10 PROBLEM — R69 ILL-DEFINED CONDITION: Status: ACTIVE | Noted: 2022-05-10

## 2022-05-10 PROBLEM — E66.3 OVERWEIGHT (BMI 25.0-29.9): Status: ACTIVE | Noted: 2022-05-10

## 2022-05-10 PROBLEM — I87.2 CHRONIC VENOUS INSUFFICIENCY: Status: ACTIVE | Noted: 2022-05-10

## 2022-06-08 ENCOUNTER — APPOINTMENT (RX ONLY)
Dept: URBAN - METROPOLITAN AREA CLINIC 329 | Facility: CLINIC | Age: 72
Setting detail: DERMATOLOGY
End: 2022-06-08

## 2022-06-08 DIAGNOSIS — L29.8 OTHER PRURITUS: ICD-10-CM

## 2022-06-08 DIAGNOSIS — L85.3 XEROSIS CUTIS: ICD-10-CM

## 2022-06-08 DIAGNOSIS — L29.89 OTHER PRURITUS: ICD-10-CM

## 2022-06-08 DIAGNOSIS — L20.89 OTHER ATOPIC DERMATITIS: ICD-10-CM | Status: INADEQUATELY CONTROLLED

## 2022-06-08 DIAGNOSIS — L43.8 OTHER LICHEN PLANUS: ICD-10-CM

## 2022-06-08 PROBLEM — L20.84 INTRINSIC (ALLERGIC) ECZEMA: Status: ACTIVE | Noted: 2022-06-08

## 2022-06-08 PROCEDURE — ? COUNSELING

## 2022-06-08 PROCEDURE — 99204 OFFICE O/P NEW MOD 45 MIN: CPT

## 2022-06-08 PROCEDURE — ? PRESCRIPTION

## 2022-06-08 RX ORDER — TRIAMCINOLONE ACETONIDE 1 MG/G
CREAM TOPICAL BID
Qty: 454 | Refills: 4 | Status: ERX | COMMUNITY
Start: 2022-06-08

## 2022-06-08 RX ORDER — FLUTICASONE PROPIONATE 0.05 MG/G
OINTMENT TOPICAL
Qty: 60 | Refills: 5 | Status: ERX | COMMUNITY
Start: 2022-06-08

## 2022-06-08 RX ADMIN — TRIAMCINOLONE ACETONIDE: 1 CREAM TOPICAL at 00:00

## 2022-06-08 RX ADMIN — FLUTICASONE PROPIONATE: 0.05 OINTMENT TOPICAL at 00:00

## 2022-06-08 ASSESSMENT — LOCATION DETAILED DESCRIPTION DERM
LOCATION DETAILED: LEFT PROXIMAL PRETIBIAL REGION
LOCATION DETAILED: RIGHT DISTAL PRETIBIAL REGION
LOCATION DETAILED: RIGHT DISTAL CALF
LOCATION DETAILED: RIGHT POSTERIOR ANKLE
LOCATION DETAILED: LEFT POSTERIOR ANKLE
LOCATION DETAILED: LEFT DISTAL PRETIBIAL REGION
LOCATION DETAILED: RIGHT PROXIMAL PRETIBIAL REGION

## 2022-06-08 ASSESSMENT — LOCATION SIMPLE DESCRIPTION DERM
LOCATION SIMPLE: RIGHT ANKLE
LOCATION SIMPLE: RIGHT CALF
LOCATION SIMPLE: RIGHT PRETIBIAL REGION
LOCATION SIMPLE: LEFT PRETIBIAL REGION
LOCATION SIMPLE: LEFT ANKLE

## 2022-06-08 ASSESSMENT — LOCATION ZONE DERM: LOCATION ZONE: LEG

## 2022-06-08 NOTE — PROCEDURE: MIPS QUALITY
Detail Level: Detailed
Quality 226: Preventive Care And Screening: Tobacco Use: Screening And Cessation Intervention: Patient screened for tobacco use and is an ex/non-smoker
Quality 111:Pneumonia Vaccination Status For Older Adults: Pneumococcal vaccine administered on or after patient’s 60th birthday and before the end of the measurement period
Quality 130: Documentation Of Current Medications In The Medical Record: Current Medications Documented
Quality 47: Advance Care Plan: Advance Care Planning discussed and documented; advance care plan or surrogate decision maker documented in the medical record.
Quality 431: Preventive Care And Screening: Unhealthy Alcohol Use - Screening: Patient not identified as an unhealthy alcohol user when screened for unhealthy alcohol use using a systematic screening method
Quality 110: Preventive Care And Screening: Influenza Immunization: Influenza Immunization previously received during influenza season

## 2022-07-18 ENCOUNTER — APPOINTMENT (RX ONLY)
Dept: URBAN - METROPOLITAN AREA CLINIC 329 | Facility: CLINIC | Age: 72
Setting detail: DERMATOLOGY
End: 2022-07-18

## 2022-07-18 DIAGNOSIS — L29.89 OTHER PRURITUS: ICD-10-CM | Status: IMPROVED

## 2022-07-18 DIAGNOSIS — L85.3 XEROSIS CUTIS: ICD-10-CM

## 2022-07-18 DIAGNOSIS — L20.89 OTHER ATOPIC DERMATITIS: ICD-10-CM | Status: STABLE

## 2022-07-18 DIAGNOSIS — L29.8 OTHER PRURITUS: ICD-10-CM | Status: IMPROVED

## 2022-07-18 DIAGNOSIS — L43.8 OTHER LICHEN PLANUS: ICD-10-CM | Status: IMPROVED

## 2022-07-18 PROBLEM — L20.84 INTRINSIC (ALLERGIC) ECZEMA: Status: ACTIVE | Noted: 2022-07-18

## 2022-07-18 PROCEDURE — ? COUNSELING

## 2022-07-18 PROCEDURE — ? ADDITIONAL NOTES

## 2022-07-18 PROCEDURE — 99214 OFFICE O/P EST MOD 30 MIN: CPT

## 2022-07-18 PROCEDURE — ? PRESCRIPTION MEDICATION MANAGEMENT

## 2022-07-18 PROCEDURE — ? PRESCRIPTION

## 2022-07-18 RX ORDER — AMMONIUM LACTATE 12 G/100G
LOTION TOPICAL
Qty: 400 | Refills: 2 | Status: ERX | COMMUNITY
Start: 2022-07-18

## 2022-07-18 RX ORDER — FLUTICASONE PROPIONATE 0.05 MG/G
OINTMENT TOPICAL
Qty: 60 | Refills: 5 | Status: ERX

## 2022-07-18 RX ORDER — TRIAMCINOLONE ACETONIDE 1 MG/G
CREAM TOPICAL
Qty: 454 | Refills: 4 | Status: ERX

## 2022-07-18 RX ADMIN — AMMONIUM LACTATE: 12 LOTION TOPICAL at 00:00

## 2022-07-18 ASSESSMENT — LOCATION SIMPLE DESCRIPTION DERM
LOCATION SIMPLE: LEFT PRETIBIAL REGION
LOCATION SIMPLE: RIGHT ANKLE
LOCATION SIMPLE: LEFT ANKLE
LOCATION SIMPLE: RIGHT PRETIBIAL REGION
LOCATION SIMPLE: RIGHT CALF

## 2022-07-18 ASSESSMENT — LOCATION DETAILED DESCRIPTION DERM
LOCATION DETAILED: RIGHT POSTERIOR ANKLE
LOCATION DETAILED: LEFT DISTAL PRETIBIAL REGION
LOCATION DETAILED: RIGHT DISTAL PRETIBIAL REGION
LOCATION DETAILED: RIGHT PROXIMAL PRETIBIAL REGION
LOCATION DETAILED: LEFT POSTERIOR ANKLE
LOCATION DETAILED: RIGHT DISTAL CALF
LOCATION DETAILED: LEFT PROXIMAL PRETIBIAL REGION

## 2022-07-18 ASSESSMENT — LOCATION ZONE DERM: LOCATION ZONE: LEG

## 2022-07-18 ASSESSMENT — ITCH NUMERIC RATING SCALE: HOW SEVERE IS YOUR ITCHING?: 1

## 2022-07-18 NOTE — PROCEDURE: PRESCRIPTION MEDICATION MANAGEMENT
Render In Strict Bullet Format?: No
Initiate Treatment: Ammonium lactate lotion
Detail Level: Zone
Continue Regimen: Triamcinalone daily \\nFluticasone ointment alternate with ammonium lactate lotion

## 2022-07-20 ENCOUNTER — RX ONLY (OUTPATIENT)
Age: 72
Setting detail: RX ONLY
End: 2022-07-20

## 2022-07-20 RX ORDER — AMMONIUM LACTATE 12 G/100G
LOTION TOPICAL
Qty: 400 | Refills: 2 | Status: ERX

## 2022-07-20 RX ORDER — TRIAMCINOLONE ACETONIDE 1 MG/G
CREAM TOPICAL
Qty: 454 | Refills: 4 | Status: ERX

## 2022-07-20 RX ORDER — FLUTICASONE PROPIONATE 0.05 MG/G
OINTMENT TOPICAL
Qty: 60 | Refills: 5 | Status: ERX

## 2022-10-19 ENCOUNTER — APPOINTMENT (RX ONLY)
Dept: URBAN - METROPOLITAN AREA CLINIC 329 | Facility: CLINIC | Age: 72
Setting detail: DERMATOLOGY
End: 2022-10-19

## 2022-10-19 DIAGNOSIS — D22 MELANOCYTIC NEVI: ICD-10-CM

## 2022-10-19 DIAGNOSIS — L85.3 XEROSIS CUTIS: ICD-10-CM

## 2022-10-19 DIAGNOSIS — L43.8 OTHER LICHEN PLANUS: ICD-10-CM

## 2022-10-19 DIAGNOSIS — L20.89 OTHER ATOPIC DERMATITIS: ICD-10-CM | Status: INADEQUATELY CONTROLLED

## 2022-10-19 PROBLEM — D22.4 MELANOCYTIC NEVI OF SCALP AND NECK: Status: ACTIVE | Noted: 2022-10-19

## 2022-10-19 PROBLEM — L20.84 INTRINSIC (ALLERGIC) ECZEMA: Status: ACTIVE | Noted: 2022-10-19

## 2022-10-19 PROBLEM — D22.39 MELANOCYTIC NEVI OF OTHER PARTS OF FACE: Status: ACTIVE | Noted: 2022-10-19

## 2022-10-19 PROCEDURE — 99213 OFFICE O/P EST LOW 20 MIN: CPT

## 2022-10-19 PROCEDURE — ? SUNSCREEN RECOMMENDATIONS

## 2022-10-19 PROCEDURE — ? COUNSELING

## 2022-10-19 PROCEDURE — ? FULL BODY SKIN EXAM - DECLINED

## 2022-10-19 PROCEDURE — ? ADDITIONAL NOTES

## 2022-10-19 ASSESSMENT — LOCATION SIMPLE DESCRIPTION DERM
LOCATION SIMPLE: RIGHT PRETIBIAL REGION
LOCATION SIMPLE: LEFT ANKLE
LOCATION SIMPLE: LEFT CHEEK
LOCATION SIMPLE: RIGHT ANKLE
LOCATION SIMPLE: LEFT ANTERIOR NECK
LOCATION SIMPLE: LEFT PRETIBIAL REGION

## 2022-10-19 ASSESSMENT — ITCH NUMERIC RATING SCALE: HOW SEVERE IS YOUR ITCHING?: 6

## 2022-10-19 ASSESSMENT — LOCATION DETAILED DESCRIPTION DERM
LOCATION DETAILED: RIGHT POSTERIOR ANKLE
LOCATION DETAILED: RIGHT DISTAL PRETIBIAL REGION
LOCATION DETAILED: RIGHT PROXIMAL PRETIBIAL REGION
LOCATION DETAILED: LEFT INFERIOR LATERAL NECK
LOCATION DETAILED: LEFT DISTAL PRETIBIAL REGION
LOCATION DETAILED: LEFT POSTERIOR ANKLE
LOCATION DETAILED: LEFT PROXIMAL PRETIBIAL REGION
LOCATION DETAILED: LEFT INFERIOR CENTRAL MALAR CHEEK

## 2022-10-19 ASSESSMENT — LOCATION ZONE DERM
LOCATION ZONE: LEG
LOCATION ZONE: NECK
LOCATION ZONE: FACE

## 2022-10-19 ASSESSMENT — BSA RASH: BSA RASH: 41

## 2023-04-24 ENCOUNTER — APPOINTMENT (RX ONLY)
Dept: URBAN - METROPOLITAN AREA CLINIC 329 | Facility: CLINIC | Age: 73
Setting detail: DERMATOLOGY
End: 2023-04-24

## 2023-04-24 DIAGNOSIS — L43.8 OTHER LICHEN PLANUS: ICD-10-CM

## 2023-04-24 DIAGNOSIS — L20.89 OTHER ATOPIC DERMATITIS: ICD-10-CM | Status: INADEQUATELY CONTROLLED

## 2023-04-24 PROBLEM — L20.84 INTRINSIC (ALLERGIC) ECZEMA: Status: ACTIVE | Noted: 2023-04-24

## 2023-04-24 PROCEDURE — ? PRESCRIPTION

## 2023-04-24 PROCEDURE — ? KOH PREP

## 2023-04-24 PROCEDURE — 87220 TISSUE EXAM FOR FUNGI: CPT

## 2023-04-24 PROCEDURE — 99214 OFFICE O/P EST MOD 30 MIN: CPT

## 2023-04-24 RX ORDER — CLOBETASOL PROPIONATE 0.5 MG/G
OINTMENT TOPICAL
Qty: 60 | Refills: 5 | Status: ERX | COMMUNITY
Start: 2023-04-24

## 2023-04-24 RX ADMIN — CLOBETASOL PROPIONATE: 0.5 OINTMENT TOPICAL at 00:00

## 2023-04-24 ASSESSMENT — LOCATION DETAILED DESCRIPTION DERM
LOCATION DETAILED: LEFT PROXIMAL PRETIBIAL REGION
LOCATION DETAILED: RIGHT PROXIMAL PRETIBIAL REGION
LOCATION DETAILED: LEFT ANKLE
LOCATION DETAILED: RIGHT DISTAL PRETIBIAL REGION

## 2023-04-24 ASSESSMENT — LOCATION ZONE DERM: LOCATION ZONE: LEG

## 2023-04-24 ASSESSMENT — LOCATION SIMPLE DESCRIPTION DERM
LOCATION SIMPLE: LEFT ANKLE
LOCATION SIMPLE: LEFT PRETIBIAL REGION
LOCATION SIMPLE: RIGHT PRETIBIAL REGION

## 2023-05-22 DIAGNOSIS — Z96.652 STATUS POST TOTAL KNEE REPLACEMENT, LEFT: Primary | ICD-10-CM

## 2023-05-22 RX ORDER — AMOXICILLIN 500 MG/1
TABLET, FILM COATED ORAL
Qty: 12 TABLET | Refills: 0 | Status: SHIPPED | OUTPATIENT
Start: 2023-05-22

## 2023-05-22 NOTE — TELEPHONE ENCOUNTER
Fax # [phone]. He has a dental appt tomorrow. They would like a note faxed to them explaining our protocol for premedicating and if you can call him in an antibiotic.

## (undated) DEVICE — SOLUTION IV 1000ML 0.9% SOD CHL

## (undated) DEVICE — T4 HOOD

## (undated) DEVICE — HANDPIECE SET WITH COAXIAL HIGH FLOW TIP AND SUCTION TUBE: Brand: INTERPULSE

## (undated) DEVICE — Device

## (undated) DEVICE — SYR 10ML LUER LOK 1/5ML GRAD --

## (undated) DEVICE — DRAPE,TOP,102X53,STERILE: Brand: MEDLINE

## (undated) DEVICE — 3000CC GUARDIAN II: Brand: GUARDIAN

## (undated) DEVICE — REM POLYHESIVE ADULT PATIENT RETURN ELECTRODE: Brand: VALLEYLAB

## (undated) DEVICE — SYR 50ML LR LCK 1ML GRAD NSAF --

## (undated) DEVICE — SUT ETHBND 2 30IN LR DA GRN --

## (undated) DEVICE — PACK PROCEDURE SURG TOT KNEE

## (undated) DEVICE — SOLUTION IV DEXTROSE/SALINE 5%-0.9% 500ML - 500ML

## (undated) DEVICE — TRAY CATH 16F DRN BG LTX -- CONVERT TO ITEM 363158

## (undated) DEVICE — Z DISCONTINUED USE 2744636  DRESSING AQUACEL 14 IN ALG W3.5XL14IN POLYUR FLM CVR W/ HYDRCOLL

## (undated) DEVICE — X-LARGE COTTON GLOVE: Brand: DEROYAL

## (undated) DEVICE — BANDAGE COMPR SELF ADH 5 YDX4 IN TAN STRL PREMIERPRO LF

## (undated) DEVICE — SOLUTION IRRIG 3000ML 0.9% SOD CHL FLX CONT 0797208] ICU MEDICAL INC]

## (undated) DEVICE — SYRINGE CATH TIP 50ML

## (undated) DEVICE — BUTTON SWITCH PENCIL BLADE ELECTRODE, HOLSTER: Brand: EDGE

## (undated) DEVICE — (D)PREP SKN CHLRAPRP APPL 26ML -- CONVERT TO ITEM 371833

## (undated) DEVICE — SUTURE ABSRB X-1 REV CUT 1/2 CIR 22MM UD BRAID 27IN SZ 3-0 J458H

## (undated) DEVICE — SYR LR LCK 1ML GRAD NSAF 30ML --

## (undated) DEVICE — BIPOLAR SEALER 23-112-1 AQM 6.0: Brand: AQUAMANTYS ®

## (undated) DEVICE — 3M™ STERI-DRAPE™ INSTRUMENT POUCH 1018: Brand: STERI-DRAPE™

## (undated) DEVICE — CLOSURE SKIN FLX NONINVASIVE PRELOC TECHNOLOGY FOR 24IN

## (undated) DEVICE — SUTURE STRATAFIX SYMMETRIC PDS + SZ 2-0 L18IN ABSRB VLT SXPP1A403

## (undated) DEVICE — BLADE SAW PAT RMR PILT H 46MM --

## (undated) DEVICE — CLOSURE SKIN FACILITATES COMPATIBILITY W/ CERTAIN IS DSG

## (undated) DEVICE — MEDI-VAC YANKAUER SUCTION HANDLE W/BULBOUS TIP: Brand: CARDINAL HEALTH

## (undated) DEVICE — 2000CC GUARDIAN II: Brand: GUARDIAN

## (undated) DEVICE — SUTURE VCRL SZ 1 L27IN ABSRB UD L36MM CP-1 1/2 CIR REV CUT J268H

## (undated) DEVICE — SUTURE PDS II SZ 1 L96IN ABSRB VLT TP-1 L65MM 1/2 CIR Z880G

## (undated) DEVICE — SLIM BODY SKIN STAPLER: Brand: APPOSE ULC

## (undated) DEVICE — OSCILLATING TIP SAW CARTRIDGE: Brand: STRYKER PRECISION

## (undated) DEVICE — TRAY PREP DRY W/ PREM GLV 2 APPL 6 SPNG 2 UNDPD 1 OVERWRAP